# Patient Record
Sex: MALE | Race: WHITE | NOT HISPANIC OR LATINO | Employment: FULL TIME | URBAN - METROPOLITAN AREA
[De-identification: names, ages, dates, MRNs, and addresses within clinical notes are randomized per-mention and may not be internally consistent; named-entity substitution may affect disease eponyms.]

---

## 2017-01-30 ENCOUNTER — OFFICE VISIT (OUTPATIENT)
Dept: CARDIOLOGY | Facility: CLINIC | Age: 62
End: 2017-01-30

## 2017-01-30 VITALS
BODY MASS INDEX: 29.52 KG/M2 | HEART RATE: 83 BPM | WEIGHT: 206.2 LBS | SYSTOLIC BLOOD PRESSURE: 116 MMHG | HEIGHT: 70 IN | DIASTOLIC BLOOD PRESSURE: 86 MMHG

## 2017-01-30 DIAGNOSIS — I48.0 PAROXYSMAL ATRIAL FIBRILLATION (HCC): Primary | ICD-10-CM

## 2017-01-30 DIAGNOSIS — I48.3 TYPICAL ATRIAL FLUTTER (HCC): ICD-10-CM

## 2017-01-30 DIAGNOSIS — R07.89 OTHER CHEST PAIN: ICD-10-CM

## 2017-01-30 PROCEDURE — 99213 OFFICE O/P EST LOW 20 MIN: CPT | Performed by: INTERNAL MEDICINE

## 2017-01-30 NOTE — PROGRESS NOTES
Subjective:   Mike Knapp Jr.  1955  [phone]  [phone]    01/30/2017    [facility] CARDIOLOGY    Manuel Cooper MD  [address] [place] [address] [address] [place] [address]    REFERRING DOCTOR: Phoebe Renae      Patient ID: Mike Knapp Jr. is a 61 y.o. male.    Chief Complaint: Afib      IDENTIFICATION: The patient is a 61-year-old white male, [facility]  and resident of [place], [place], here for followup visit for his atrial flutter, status post ablation on 10/17/2014 and diagnosis of atrial fibrillation after that time s/p PVA in May 2016.      PROBLEM LIST:  1. Chest pain:  a. Symptoms of twinges of chest pain occurring at rest x3 months.   b. Exercise GXT, Dr. Welch: Exercise duration 4 minutes and 23 seconds with no evidence of inducible ischemia.   c. Echocardiogram, 05/27/2014, Dr. Welch, revealing normal left ventricular ejection fraction (60%) with normal valvular structures.   2. Atrial flutter/atrial fibrillation:  a. Documentation by EKG, 08/25/2014.  b. Patient is status post EP study and typical flutter ablation on 10/17/2014.   c. On 04/19/2014, patient noted palpitations at home and went to the emergency department. On telemetry monitor at the outside hospital, patient was noted to be in type 1 atrial fibrillation with rates of about 140 beats per minute. There was no atrial flutter noted on the telemetry strip or telemetry EKGs at this hospitalization.  d. Patient was started on sotalol 80 mg b.i.d. and continued on his Xarelto 20 mg once a day for this new onset detection of atrial fibrillation, status post atrial flutter ablation. CHADS score = 0 with questionable history of hypertension.   e. Patient is currently only on aspirin due to the CHADS score of 0 and per patient’s request even after explanation of his CHADS score of possibly 1 with questionable hypertension and recurrent episode of atrial fibrillation as  noted. The atrial fibrillation is very short-lived in nature.  f. Patient with continued episodes of short-lived atrial fibrillation even on the sotalol. Patient has stated that he has tried to taper off his sotalol, and whenever he is decreased to 40 mg of the sotalol, he states he has had recurrence of his atrial fibrillation with RVR.  g. Patient states that he thinks the sotalol has been causing him symptoms of feeling drained and also a heavy pressure throughout his body is noted.   h. Patient’s sotalol has been discontinued, and he is currently maintained on flecainide 50 mg b.i.d. and metoprolol 12.5 mg b.i.d. He is currently on aspirin, since he has a CHADS score of 0 with no long episodes of atrial fibrillation at the present time.   i. Patient maintained currently on flecainide 50 mg b.i.d. and aspirin with pill-in-the-pocket Xarelto. Patient has a CHADS-VASc score of 0. Patient states his last episodes of atrial fibrillation were in July and September, which lasted about 1 to 1-1/2 hours.   j. S/P PVA roof line on 5/24/2016. No recurrences of Afib. Off Flecainide now, only on metoprolol and NOAC. Could always feel the Afib.   k. Recurrent Afib on Zio patch 2% with PACs -- symptomatic  3. Thrombocytopenia:  a. Platelet count in October 2015 was 217 and most recent 92.  4. Hyperlipidemia.   5. Generalized anxiety disorder.   6. History of Clemons's esophagus/gastroesophageal reflux disease.   7. Osteoarthritis.   8. Recent colitis in 2014.  9. Left ear tinnitus secondary to a viral illness.   10. Benign lung cyst.   11. Surgical history:  a. Appendectomy.   b. Cholecystectomy.   c. Lasik surgery.   d. Knee surgery in 1960.   e. Partial colectomy.   f. Left inguinal hernia repair.     Allergies   Allergen Reactions   • Sulfa Antibiotics Rash       Current Outpatient Prescriptions:   •  cholecalciferol (VITAMIN D3) 1000 UNITS tablet, Take 1,000 Units by mouth Daily., Disp: , Rfl:   •  Cyanocobalamin (VITAMIN  "B 12 PO), Take 1 spray by mouth Daily., Disp: , Rfl:   •  ELIQUIS 5 MG tablet tablet, TAKE 1 TABLET TWO TIMES A DAY, Disp: 60 tablet, Rfl: 11  •  Flaxseed, Linseed, (FLAXSEED OIL PO), Take  by mouth Daily., Disp: , Rfl:   •  flecainide (TAMBOCOR) 50 MG tablet, Take 2 tablets by mouth 2 (Two) Times a Day., Disp: 120 tablet, Rfl: 11  •  lansoprazole (PREVACID) 30 MG capsule, Take 30 mg by mouth daily., Disp: , Rfl:   •  melatonin 1 MG tablet, Take 1.5 mg by mouth Every Night., Disp: , Rfl:   •  metoprolol tartrate (LOPRESSOR) 25 MG tablet, Take 0.5 mg by mouth 3 (Three) Times a Day., Disp: , Rfl:     History of Present Illness  61-year-old male with a history of atrial flutter in the past status post ablation with recurrent atrial fibrillation status post point vein ablation.  Patient is continuing to have recurrent episodes of atrial fibrillation as noted on monitor with 2% burden.  Continues to have symptoms of shortness of breath palpitations and some fatigue.  When he is in atrial fibrillation he does have rapid rates.  We'll continue to increase his flecainide is currently on 100 mg twice a day and continues to have these episodes.  He is now to the point that he states that he wants something else done.    No issues with chest pain, shortness of breath, fevers, chills, night sweats, PND, orthopnea or palpitations. No recent ER visits or hospital stays.      The following portions of the patient's history were reviewed and updated as appropriate: allergies, current medications, past family history, past medical history, past social history, past surgical history and problem list.    ROS   14 point ROS negative except as outlined in problem list, HPI and other parts of the note.    Procedures       Objective:       Vitals:    01/30/17 1516   BP: 116/86   BP Location: Left arm   Patient Position: Sitting   Pulse: 83   Weight: 206 lb 3.2 oz (93.5 kg)   Height: 70\" (177.8 cm)       GENERAL: Well-developed, " well-nourished patient in no acute distress.  HEENT: Normocephalic, atraumatic, PERRLA. Moist mucous membranes.  NECK: No JVD present at 30°. No carotid bruits auscultated.  LUNGS: Clear to auscultation.  CARDIOVASCULAR: Heart has a regular rate and rhythm. No murmurs, gallops or rubs noted.   ABDOMEN: Soft, nontender. Positive bowel sounds.  MUSCULOSKELETAL: No gross deformities. No clubbing, cyanosis, or lower extremity edema.  SKIN: Pink, warm  Neuro: Nonfocal exam. Gait intact  Ext: No edema or bruising    The patient's old records including ambulatory rhythm recordings (ECGs, Holter/event monitor) were reviewed and discussed.      Lab Review:   Results for orders placed or performed during the hospital encounter of 05/24/16   CBC (No diff)   Result Value Ref Range    WBC 4.95 3.50 - 10.80 K/mcL    RBC 4.82 4.20 - 5.76 M/mcL    Hemoglobin 15.8 13.1 - 17.5 g/dL    Hematocrit 44.5 38.9 - 50.9 %    MCV 92.3 80.0 - 99.0 fL    MCH 32.8 (H) 27.0 - 31.0 pg    MCHC 35.5 32.0 - 36.0 g/dL    RDW-CV 13.0 11.3 - 14.5 %    Platelets 180 150 - 450 K/mcL   Magnesium   Result Value Ref Range    Magnesium 2.0 1.3 - 2.7 mg/dL   Basic metabolic panel   Result Value Ref Range    Glucose 90 70 - 100 mg/dL    BUN 14 9 - 23 mg/dL    Creatinine 0.9 0.6 - 1.3 mg/dL    Sodium 141 132 - 146 mmol/L    Potassium 4.9 3.5 - 5.5 mmol/L    Chloride 107 99 - 109 mmol/L    CO2 27 20 - 31 mmol/L    Calcium 9.0 8.7 - 10.4 mg/dL    Est GFR by Clearance 86 ml/min/1.732    Anion Gap 7 3 - 11 mmol/L   TSH   Result Value Ref Range    TSH 2.272 0.350 - 5.350 UIU/mL   Hemoglobin A1c   Result Value Ref Range    Hemoglobin A1C 4.6 4.00 - 6.00 %    Mean Bld Glu Estim. 78 mg/dL   POCT activated clotting time   Result Value Ref Range    Activated Clotting Time 380 (H) 74 - 125 Seconds   POCT activated clotting time   Result Value Ref Range    Activated Clotting Time 356 (H) 74 - 125 Seconds   POCT activated clotting time   Result Value Ref Range    Activated  Clotting Time 349 (H) 74 - 125 Seconds   POCT activated clotting time   Result Value Ref Range    Activated Clotting Time 398 (H) 74 - 125 Seconds   POCT activated clotting time   Result Value Ref Range    Activated Clotting Time 251 (H) 74 - 125 Seconds   POCT activated clotting time   Result Value Ref Range    Activated Clotting Time 134 (H) 74 - 125 Seconds   POCT activated clotting time   Result Value Ref Range    Activated Clotting Time 288 (H) 74 - 125 Seconds           Diagnosis:   1. Paroxysmal atrial fibrillation  2. Typical atrial flutter  3. Other chest pain      Assessment & Plan:   1. Paroxysmal Atrial Fibrillation s/p PVA / Roof Line due to failed AADs and medications in may 2016 and atrial flutter s/p typical flutter ablation in 2014. His antiarrhythmic medication was stopped initially however restarted due to recurrent tachycardia palpitations.  He's currently on flecainide 100 mg twice a day and a most recent 2 week monitor his had 2% symptomatic atrial fibrillation rapid ventricular rates.  He continues to have these symptomatic episodes despite medical therapy.  We will stop the patient's flecainide on Wednesday and start him on dofetilide on Tuesday.  We'll call his insurance to see the cause.  If he was to have recurrent episodes of atrial fibrillation at that time then consideration for redo ablation.      2. GERD -- On PPI         CC: Manuel Lynch DO  01/30/17  4:01 PM      EMR Dragon/Transcription disclaimer:  Much of this encounter note is an electronic transcription/translation of spoken language to printed text. Electronic translation of spoken language may permit erroneous, or at times, nonsensical words or phrases to be inadvertently transcribed. Although I have reviewed the note for such errors, some may still exist.

## 2017-02-02 ENCOUNTER — PREP FOR SURGERY (OUTPATIENT)
Dept: CARDIOLOGY | Facility: CLINIC | Age: 62
End: 2017-02-02

## 2017-02-06 ENCOUNTER — APPOINTMENT (OUTPATIENT)
Dept: PREADMISSION TESTING | Facility: HOSPITAL | Age: 62
End: 2017-02-06

## 2017-02-06 LAB
ANION GAP SERPL CALCULATED.3IONS-SCNC: -2 MMOL/L (ref 3–11)
APTT PPP: 29 SECONDS (ref 24–31)
BUN BLD-MCNC: 13 MG/DL (ref 9–23)
BUN/CREAT SERPL: 14.4 (ref 7–25)
CA-I SERPL ISE-MCNC: 1.24 MMOL/L (ref 1.12–1.32)
CALCIUM SPEC-SCNC: 9.3 MG/DL (ref 8.7–10.4)
CHLORIDE SERPL-SCNC: 107 MMOL/L (ref 99–109)
CO2 SERPL-SCNC: 36 MMOL/L (ref 20–31)
CREAT BLD-MCNC: 0.9 MG/DL (ref 0.6–1.3)
GFR SERPL CREATININE-BSD FRML MDRD: 85 ML/MIN/1.73
GLUCOSE BLD-MCNC: 87 MG/DL (ref 70–100)
INR PPP: 0.97
MAGNESIUM SERPL-MCNC: 2.1 MG/DL (ref 1.3–2.7)
POTASSIUM BLD-SCNC: 4.7 MMOL/L (ref 3.5–5.5)
PROTHROMBIN TIME: 10.6 SECONDS (ref 9.6–11.5)
SODIUM BLD-SCNC: 141 MMOL/L (ref 132–146)

## 2017-02-06 PROCEDURE — 93010 ELECTROCARDIOGRAM REPORT: CPT | Performed by: INTERNAL MEDICINE

## 2017-02-07 ENCOUNTER — HOSPITAL ENCOUNTER (INPATIENT)
Facility: HOSPITAL | Age: 62
LOS: 2 days | Discharge: HOME | End: 2017-02-09
Attending: INTERNAL MEDICINE | Admitting: INTERNAL MEDICINE

## 2017-02-07 PROCEDURE — 93005 ELECTROCARDIOGRAM TRACING: CPT | Performed by: INTERNAL MEDICINE

## 2017-02-07 PROCEDURE — 99222 1ST HOSP IP/OBS MODERATE 55: CPT | Performed by: INTERNAL MEDICINE

## 2017-02-07 PROCEDURE — 93010 ELECTROCARDIOGRAM REPORT: CPT | Performed by: INTERNAL MEDICINE

## 2017-02-07 RX ORDER — MELATONIN
1000 DAILY
Status: DISCONTINUED | OUTPATIENT
Start: 2017-02-07 | End: 2017-02-09 | Stop reason: HOSPADM

## 2017-02-07 RX ORDER — POTASSIUM CHLORIDE 750 MG/1
40 CAPSULE, EXTENDED RELEASE ORAL AS NEEDED
Status: DISCONTINUED | OUTPATIENT
Start: 2017-02-07 | End: 2017-02-09 | Stop reason: HOSPADM

## 2017-02-07 RX ORDER — POTASSIUM CHLORIDE 1.5 G/1.77G
40 POWDER, FOR SOLUTION ORAL AS NEEDED
Status: DISCONTINUED | OUTPATIENT
Start: 2017-02-07 | End: 2017-02-09 | Stop reason: HOSPADM

## 2017-02-07 RX ORDER — UBIDECARENONE 75 MG
100 CAPSULE ORAL DAILY
Status: DISCONTINUED | OUTPATIENT
Start: 2017-02-07 | End: 2017-02-09 | Stop reason: HOSPADM

## 2017-02-07 RX ORDER — DOFETILIDE 0.5 MG/1
500 CAPSULE ORAL EVERY 12 HOURS SCHEDULED
Status: DISCONTINUED | OUTPATIENT
Start: 2017-02-07 | End: 2017-02-08

## 2017-02-07 RX ORDER — DOFETILIDE 0.5 MG/1
500 CAPSULE ORAL ONCE
Status: COMPLETED | OUTPATIENT
Start: 2017-02-07 | End: 2017-02-07

## 2017-02-07 RX ORDER — PANTOPRAZOLE SODIUM 40 MG/1
40 TABLET, DELAYED RELEASE ORAL
Status: DISCONTINUED | OUTPATIENT
Start: 2017-02-07 | End: 2017-02-09 | Stop reason: HOSPADM

## 2017-02-07 RX ADMIN — METOPROLOL TARTRATE 12.5 MG: 25 TABLET ORAL at 20:16

## 2017-02-07 RX ADMIN — METOPROLOL TARTRATE 12.5 MG: 25 TABLET ORAL at 11:48

## 2017-02-07 RX ADMIN — DOFETILIDE 500 MCG: 0.5 CAPSULE ORAL at 22:00

## 2017-02-07 RX ADMIN — DOFETILIDE 500 MCG: 0.5 CAPSULE ORAL at 11:48

## 2017-02-07 RX ADMIN — APIXABAN 5 MG: 5 TABLET, FILM COATED ORAL at 18:37

## 2017-02-07 NOTE — H&P
Electrophysiology History & Physical    Mike Knapp Jr.  2508/[birth year]  [id number]  1955    DATE OF ADMISSION: 2/7/2017    Manuel Cooper MD  Primary Cardiologist: Adithya Lynch      Chief complaint Afib   a.     History of Present Illness   61-year-old male with a history of atrial flutter in the past status post ablation with recurrent atrial fibrillation status post pulm vein ablation. Patient is continuing to have recurrent episodes of atrial fibrillation as noted on monitor with 2% burden. Continues to have symptoms of shortness of breath palpitations and some fatigue and recurrent episodes of Afib. When he is in atrial fibrillation he does have rapid rates. We had increased his flecainide is currently on 100 mg twice a day and continues to have these episodes. He was to the point that he states that he wants something else done and had his Flec stopped for > 5 days and now here for Tikosyn loading.     In SR and QTc looks ok.      Past Medical History   Diagnosis Date   • AF (atrial fibrillation)    • Atrial flutter 8/25/2016     atrial fibrillation: Documentation by EKG, 08/25/2014. Patient is status post EP study and typical flutter ablation on 10/17/2014.   On 04/19/2014, patient noted palpitations at home and went to the emergency department.  On telemetry monitor at the outside hospital, patient was noted to be in type 1 atrial fibrillation with rates of about 140 beats per minute.  There was no atrial flutter noted o   • Barretts esophagus    • Chest pain    • Colitis 2014   • Colitis 2014   • CLAUDIA (generalized anxiety disorder)    • GERD (gastroesophageal reflux disease)      Clemons's esophagus H/O   • H/O atrial flutter    • Hyperlipidemia    • Lung cyst      benign   • Lung cyst      Benign    • Osteoarthritis    • Osteoarthritis    • PONV (postoperative nausea and vomiting)    • Thrombocytopenia    • Tinnitus      Left ear Secondary to a viral illness    • Tinnitus of left ear      secondary  to viral illness         Past Surgical History   Procedure Laterality Date   • Appendectomy     • Cholecystectomy     • Lasik     • Knee surgery  1960   • Colectomy partial / total     • Inguinal hernia repair Left    • Lasik     • Cardiac ablation     • Hernia repair         Prescriptions Prior to Admission   Medication Sig Dispense Refill Last Dose   • cholecalciferol (VITAMIN D3) 1000 UNITS tablet Take 1,000 Units by mouth Daily.   2/7/2017 at 0700   • Cyanocobalamin (VITAMIN B 12 PO) Take 1 spray by mouth Daily.   Past Week at Unknown time   • ELIQUIS 5 MG tablet tablet TAKE 1 TABLET TWO TIMES A DAY 60 tablet 11 2/7/2017 at 0700   • Flaxseed, Linseed, (FLAXSEED OIL PO) Take 1 tablet by mouth Daily.   2/7/2017 at 0700   • lansoprazole (PREVACID) 30 MG capsule Take 30 mg by mouth daily.   2/7/2017 at 0700   • melatonin 1 MG tablet Take 1.5 mg by mouth Every Night.   2/6/2017 at 2100   • metoprolol tartrate (LOPRESSOR) 25 MG tablet Take 12.5 mg by mouth 3 (Three) Times a Day.   2/7/2017 at 0400       Current Meds  No current facility-administered medications on file prior to encounter.      Current Outpatient Prescriptions on File Prior to Encounter   Medication Sig Dispense Refill   • cholecalciferol (VITAMIN D3) 1000 UNITS tablet Take 1,000 Units by mouth Daily.     • Cyanocobalamin (VITAMIN B 12 PO) Take 1 spray by mouth Daily.     • ELIQUIS 5 MG tablet tablet TAKE 1 TABLET TWO TIMES A DAY 60 tablet 11   • Flaxseed, Linseed, (FLAXSEED OIL PO) Take 1 tablet by mouth Daily.     • lansoprazole (PREVACID) 30 MG capsule Take 30 mg by mouth daily.     • melatonin 1 MG tablet Take 1.5 mg by mouth Every Night.     • metoprolol tartrate (LOPRESSOR) 25 MG tablet Take 12.5 mg by mouth 3 (Three) Times a Day.     • [DISCONTINUED] flecainide (TAMBOCOR) 50 MG tablet Take 2 tablets by mouth 2 (Two) Times a Day. (Patient taking differently: Take 100 mg by mouth 2 (Two) Times a Day. Stopped per Dr. Johns orders on 1-31-17) 120  "tablet 11         Social History     Social History   • Marital status:      Spouse name: N/A   • Number of children: N/A   • Years of education: N/A     Occupational History   • Not on file.     Social History Main Topics   • Smoking status: Never Smoker   • Smokeless tobacco: Never Used   • Alcohol use No   • Drug use: No   • Sexual activity: Defer     Other Topics Concern   • Not on file     Social History Narrative         REVIEW OF SYSTEMS:   12 point ROS was performed and is negative except as outlined in HPI           Objective:     Vitals:    02/07/17 0945 02/07/17 0947 02/07/17 1148 02/07/17 1330   BP: 127/78 120/78 124/80 115/75   BP Location: Right arm Left arm     Patient Position: Lying Lying     Pulse: 89  76 62   Resp: 18   16   Temp: 98.8 °F (37.1 °C)      TempSrc: Tympanic      SpO2: 95%   97%   Weight: 210 lb 12.2 oz (95.6 kg)      Height: 69\" (175.3 cm)        Body mass index is 31.12 kg/(m^2).  Flowsheet Rows         First Filed Value    Admission Height  69\" (175.3 cm) Documented at 02/07/2017 0945    Admission Weight  210 lb 12.2 oz (95.6 kg) Documented at 02/07/2017 0945          General Appearance:    Alert, cooperative, in no acute distress   Head:    Normocephalic, without obvious abnormality, atraumatic   Eyes:            Lids and lashes normal, conjunctivae and sclerae normal, no   icterus, no pallor, corneas clear, PERRLA   Ears:    Ears appear intact with no abnormalities noted   Throat:   No oral lesions, no thrush, oral mucosa moist   Neck:   No adenopathy, supple, trachea midline, no thyromegaly, no   carotid bruit, no JVD   Back:     No kyphosis present, no scoliosis present, no skin lesions, erythema or scars, no tenderness to percussion or palpation, range of motion normal   Lungs:     Clear to auscultation,respirations regular, even and unlabored    Heart:    Regular rhythm and normal rate, normal S1 and S2, no murmur, no gallop, no rub, no click   Chest Wall:    No " abnormalities observed   Abdomen:     Normal bowel sounds, no masses, no organomegaly, soft        non-tender, non-distended, no guarding, no rebound  tenderness   Extremities:   Moves all extremities well, no edema, no cyanosis, no redness   Pulses:   Pulses palpable and equal bilaterally. Normal radial, carotid, femoral, dorsalis pedis and posterior tibial pulses bilaterally. Normal abdominal aorta   Skin:   No bleeding, bruising or rash   Lymph nodes:   No palpable adenopathy   Psychiatric:   Alert and oented x 3, normal mood and affect.   Lab Review:      Results Review:     I reviewed the patient's new clinical results.          Results from last 7 days  Lab Units 02/06/17  1158   SODIUM mmol/L 141   POTASSIUM mmol/L 4.7   CHLORIDE mmol/L 107   TOTAL CO2 mmol/L 36.0*   BUN mg/dL 13   CREATININE mg/dL 0.90   CALCIUM mg/dL 9.3   GLUCOSE mg/dL 87       Results from last 7 days  Lab Units 02/06/17  1158   SODIUM mmol/L 141   POTASSIUM mmol/L 4.7   CHLORIDE mmol/L 107   TOTAL CO2 mmol/L 36.0*   BUN mg/dL 13   CREATININE mg/dL 0.90   GLUCOSE mg/dL 87   CALCIUM mg/dL 9.3       Results from last 7 days  Lab Units 02/06/17  1120   INR  0.97     No results found for: TROPONINT                  Imaging:     EKG: NSR with rate 68 bpm. QTc ok    I personally viewed and interpreted the patient's EKG/Telemetry data    Assessment:    Active Problems:    Paroxysmal atrial fibrillation    Typical Atrial Flutter       Plan:   1. Paroxysmal Atrial Fibrillation s/p PVA / Roof Line due to failed AADs and medications in may 2016 and atrial flutter s/p typical flutter ablation in 2014. His antiarrhythmic medication was stopped initially however restarted due to recurrent tachycardia palpitations. He was on flecainide 100 mg twice a day and a most recent 2 week monitor his had 2% symptomatic atrial fibrillation rapid ventricular rates. He continues to have these symptomatic episodes despite medical therapy. Here today for Tikosyn  loading. QTc ok. Has bene off Flec for > 5 days    --> Start on Tikosyn 500 mcg BID and continue metoprolol and eliquis  --> DC on Thurs if no issues. In SR today      CC: Manuel Lynch DO  [facility]  02/07/17  1:50 PM    EMR Dragon/Transcription disclaimer:  Much of this encounter note is an electronic transcription/translation of spoken language to printed text. Electronic translation of spoken language may permit erroneous, or at times, nonsensical words or phrases to be inadvertently transcribed. Although I have reviewed the note for such errors, some may still exist.

## 2017-02-07 NOTE — PROGRESS NOTES
"Tikosyn Initiation - Pharmacy Evaluation    Name- Mike Knapp Jr.  Age- 61 y.o.  Sex- male  HT - 69\" (175.3 cm)  Wt - 210 lb 12.2 oz (95.6 kg)      Evaluation of Drug-Drug Interactions     Previous antiarrythmic medications D/C 'ed prior to admission     Amiodarone D/C 'ed >3 weeks  Sotalol D/C 'ed > 48hr  Class I antiarrythmic's (Disopyramide,Flecainide, Mexiletine, Propafenone) D/C 'ed >48hr     Proceed -Yes; Flecainide was stopped on last Wednesday      Drug-Drug Interactions with admission regimen    The Following medications are contraindicated with Dofetilide and will be discontinued:  Cimetidine, Ciprofloxacin, Dolutegravir, Fingolimod, Hydrochlorothiazide and combination products containing Hydrochlorothiazide, Itraconazole, Ketoconazole, Levofloxacin, Megestrol, Moxifloxacin, Norfloxacin, Pimozide, Posconazole, Prochloperazine, Saquinavir, Thioridazine, Trimethoprim, Trimethoprim-Sulfamethoxazole, Verapamil, Ziprasidone    The following medications are recognized to prolong QT interval, however the medication continue during initial Dofetilide dosing:  -Asenapine, Diltiazem, EriBULin, Haloperidol, Ivabradine, Procainamide, Quetiapine  -Phenothiazines (chlorpromazine, fluphenazine, mesoridazine, perphenazine, promazine, trifluoperazine)  -Ondansetron, Paliperidone  -Loop Diuretics (bumetanide, furosemide, torsemide)  -Antidepressants (Amitriptyline, Amoxapine, Clomipramine, Desipramine, Doxepin, Imipramine, Maprotiline, Mirtazapine, Nortriptyline, Protriptyline, Triipramine)    The following medications may increase Dofetilide serum concentrations and can be co-administered:  -Antifungals (Fluconazole, Voriconazole)  -Macrolides Antibiotics (Erythromycin, Clarithromycin)  -Metformin, Glyburide  -SSRI's (Citalopram, Escitalopram, Fluvoxamine, Fluoxetine, Paroxetine, Sertraline)  -Protease Inhibitors (Amprenavir, Indinavir, Nelfinavir, Ritonavir)  -Amiloride, Cannabinoids, Nefazodone, Triamterene, " Quinine, Lamotrigine)  -Ibutilide, Disopyramide, Diltiazem    Laboratory      Results from last 7 days     Lab Units 02/06/17  1158   SODIUM mmol/L 141   POTASSIUM mmol/L 4.7   CHLORIDE mmol/L 107   TOTAL CO2 mmol/L 36.0*   BUN mg/dL 13   CREATININE mg/dL 0.90   GLUCOSE mg/dL 87   CALCIUM mg/dL 9.3       Results from last 7 days     Lab Units 02/06/17  1158   MAGNESIUM mg/dL 2.1     Electrolyte replacement ordered:   Mg <2.0 - Mg currently 2.1      K <4.0  - K currently 4.7    Est CrCl =  113.6 ml/min  (calculated with Cockroft-Gault equation using actual body weight and serum creatinine for calculation)  (laboratory values from previous 24 hours can be used)    Initial Dose    Patient has functioning atrial pacemaker -  No   Proceed - yes    QTc = Report 393msec, calculated 341 msec    QTc </= 440 msec -  Yes   Proceed - Yes    Initial Dose:   CrCl >60      Dofetilide 500mcg po q12h  (dosed at 10 hours intervals until administration times between 7-9)      José Antonio Gresham RPH  2/7/2017  10:28 AM

## 2017-02-07 NOTE — H&P (VIEW-ONLY)
Subjective:   Mike Knapp Jr.  1955  [phone]  [phone]    01/30/2017    [facility] CARDIOLOGY    Manuel oCoper MD  [address] [place] [address] [address] [place] [address]    REFERRING DOCTOR: Phoebe Renae      Patient ID: Mike Knapp Jr. is a 61 y.o. male.    Chief Complaint: Afib      IDENTIFICATION: The patient is a 61-year-old white male, [facility]  and resident of [place], [place], here for followup visit for his atrial flutter, status post ablation on 10/17/2014 and diagnosis of atrial fibrillation after that time s/p PVA in May 2016.      PROBLEM LIST:  1. Chest pain:  a. Symptoms of twinges of chest pain occurring at rest x3 months.   b. Exercise GXT, Dr. Welch: Exercise duration 4 minutes and 23 seconds with no evidence of inducible ischemia.   c. Echocardiogram, 05/27/2014, Dr. Welch, revealing normal left ventricular ejection fraction (60%) with normal valvular structures.   2. Atrial flutter/atrial fibrillation:  a. Documentation by EKG, 08/25/2014.  b. Patient is status post EP study and typical flutter ablation on 10/17/2014.   c. On 04/19/2014, patient noted palpitations at home and went to the emergency department. On telemetry monitor at the outside hospital, patient was noted to be in type 1 atrial fibrillation with rates of about 140 beats per minute. There was no atrial flutter noted on the telemetry strip or telemetry EKGs at this hospitalization.  d. Patient was started on sotalol 80 mg b.i.d. and continued on his Xarelto 20 mg once a day for this new onset detection of atrial fibrillation, status post atrial flutter ablation. CHADS score = 0 with questionable history of hypertension.   e. Patient is currently only on aspirin due to the CHADS score of 0 and per patient’s request even after explanation of his CHADS score of possibly 1 with questionable hypertension and recurrent episode of atrial fibrillation as  noted. The atrial fibrillation is very short-lived in nature.  f. Patient with continued episodes of short-lived atrial fibrillation even on the sotalol. Patient has stated that he has tried to taper off his sotalol, and whenever he is decreased to 40 mg of the sotalol, he states he has had recurrence of his atrial fibrillation with RVR.  g. Patient states that he thinks the sotalol has been causing him symptoms of feeling drained and also a heavy pressure throughout his body is noted.   h. Patient’s sotalol has been discontinued, and he is currently maintained on flecainide 50 mg b.i.d. and metoprolol 12.5 mg b.i.d. He is currently on aspirin, since he has a CHADS score of 0 with no long episodes of atrial fibrillation at the present time.   i. Patient maintained currently on flecainide 50 mg b.i.d. and aspirin with pill-in-the-pocket Xarelto. Patient has a CHADS-VASc score of 0. Patient states his last episodes of atrial fibrillation were in July and September, which lasted about 1 to 1-1/2 hours.   j. S/P PVA roof line on 5/24/2016. No recurrences of Afib. Off Flecainide now, only on metoprolol and NOAC. Could always feel the Afib.   k. Recurrent Afib on Zio patch 2% with PACs -- symptomatic  3. Thrombocytopenia:  a. Platelet count in October 2015 was 217 and most recent 92.  4. Hyperlipidemia.   5. Generalized anxiety disorder.   6. History of Clemons's esophagus/gastroesophageal reflux disease.   7. Osteoarthritis.   8. Recent colitis in 2014.  9. Left ear tinnitus secondary to a viral illness.   10. Benign lung cyst.   11. Surgical history:  a. Appendectomy.   b. Cholecystectomy.   c. Lasik surgery.   d. Knee surgery in 1960.   e. Partial colectomy.   f. Left inguinal hernia repair.     Allergies   Allergen Reactions   • Sulfa Antibiotics Rash       Current Outpatient Prescriptions:   •  cholecalciferol (VITAMIN D3) 1000 UNITS tablet, Take 1,000 Units by mouth Daily., Disp: , Rfl:   •  Cyanocobalamin (VITAMIN  "B 12 PO), Take 1 spray by mouth Daily., Disp: , Rfl:   •  ELIQUIS 5 MG tablet tablet, TAKE 1 TABLET TWO TIMES A DAY, Disp: 60 tablet, Rfl: 11  •  Flaxseed, Linseed, (FLAXSEED OIL PO), Take  by mouth Daily., Disp: , Rfl:   •  flecainide (TAMBOCOR) 50 MG tablet, Take 2 tablets by mouth 2 (Two) Times a Day., Disp: 120 tablet, Rfl: 11  •  lansoprazole (PREVACID) 30 MG capsule, Take 30 mg by mouth daily., Disp: , Rfl:   •  melatonin 1 MG tablet, Take 1.5 mg by mouth Every Night., Disp: , Rfl:   •  metoprolol tartrate (LOPRESSOR) 25 MG tablet, Take 0.5 mg by mouth 3 (Three) Times a Day., Disp: , Rfl:     History of Present Illness  61-year-old male with a history of atrial flutter in the past status post ablation with recurrent atrial fibrillation status post point vein ablation.  Patient is continuing to have recurrent episodes of atrial fibrillation as noted on monitor with 2% burden.  Continues to have symptoms of shortness of breath palpitations and some fatigue.  When he is in atrial fibrillation he does have rapid rates.  We'll continue to increase his flecainide is currently on 100 mg twice a day and continues to have these episodes.  He is now to the point that he states that he wants something else done.    No issues with chest pain, shortness of breath, fevers, chills, night sweats, PND, orthopnea or palpitations. No recent ER visits or hospital stays.      The following portions of the patient's history were reviewed and updated as appropriate: allergies, current medications, past family history, past medical history, past social history, past surgical history and problem list.    ROS   14 point ROS negative except as outlined in problem list, HPI and other parts of the note.    Procedures       Objective:       Vitals:    01/30/17 1516   BP: 116/86   BP Location: Left arm   Patient Position: Sitting   Pulse: 83   Weight: 206 lb 3.2 oz (93.5 kg)   Height: 70\" (177.8 cm)       GENERAL: Well-developed, " well-nourished patient in no acute distress.  HEENT: Normocephalic, atraumatic, PERRLA. Moist mucous membranes.  NECK: No JVD present at 30°. No carotid bruits auscultated.  LUNGS: Clear to auscultation.  CARDIOVASCULAR: Heart has a regular rate and rhythm. No murmurs, gallops or rubs noted.   ABDOMEN: Soft, nontender. Positive bowel sounds.  MUSCULOSKELETAL: No gross deformities. No clubbing, cyanosis, or lower extremity edema.  SKIN: Pink, warm  Neuro: Nonfocal exam. Gait intact  Ext: No edema or bruising    The patient's old records including ambulatory rhythm recordings (ECGs, Holter/event monitor) were reviewed and discussed.      Lab Review:   Results for orders placed or performed during the hospital encounter of 05/24/16   CBC (No diff)   Result Value Ref Range    WBC 4.95 3.50 - 10.80 K/mcL    RBC 4.82 4.20 - 5.76 M/mcL    Hemoglobin 15.8 13.1 - 17.5 g/dL    Hematocrit 44.5 38.9 - 50.9 %    MCV 92.3 80.0 - 99.0 fL    MCH 32.8 (H) 27.0 - 31.0 pg    MCHC 35.5 32.0 - 36.0 g/dL    RDW-CV 13.0 11.3 - 14.5 %    Platelets 180 150 - 450 K/mcL   Magnesium   Result Value Ref Range    Magnesium 2.0 1.3 - 2.7 mg/dL   Basic metabolic panel   Result Value Ref Range    Glucose 90 70 - 100 mg/dL    BUN 14 9 - 23 mg/dL    Creatinine 0.9 0.6 - 1.3 mg/dL    Sodium 141 132 - 146 mmol/L    Potassium 4.9 3.5 - 5.5 mmol/L    Chloride 107 99 - 109 mmol/L    CO2 27 20 - 31 mmol/L    Calcium 9.0 8.7 - 10.4 mg/dL    Est GFR by Clearance 86 ml/min/1.732    Anion Gap 7 3 - 11 mmol/L   TSH   Result Value Ref Range    TSH 2.272 0.350 - 5.350 UIU/mL   Hemoglobin A1c   Result Value Ref Range    Hemoglobin A1C 4.6 4.00 - 6.00 %    Mean Bld Glu Estim. 78 mg/dL   POCT activated clotting time   Result Value Ref Range    Activated Clotting Time 380 (H) 74 - 125 Seconds   POCT activated clotting time   Result Value Ref Range    Activated Clotting Time 356 (H) 74 - 125 Seconds   POCT activated clotting time   Result Value Ref Range    Activated  Clotting Time 349 (H) 74 - 125 Seconds   POCT activated clotting time   Result Value Ref Range    Activated Clotting Time 398 (H) 74 - 125 Seconds   POCT activated clotting time   Result Value Ref Range    Activated Clotting Time 251 (H) 74 - 125 Seconds   POCT activated clotting time   Result Value Ref Range    Activated Clotting Time 134 (H) 74 - 125 Seconds   POCT activated clotting time   Result Value Ref Range    Activated Clotting Time 288 (H) 74 - 125 Seconds           Diagnosis:   1. Paroxysmal atrial fibrillation  2. Typical atrial flutter  3. Other chest pain      Assessment & Plan:   1. Paroxysmal Atrial Fibrillation s/p PVA / Roof Line due to failed AADs and medications in may 2016 and atrial flutter s/p typical flutter ablation in 2014. His antiarrhythmic medication was stopped initially however restarted due to recurrent tachycardia palpitations.  He's currently on flecainide 100 mg twice a day and a most recent 2 week monitor his had 2% symptomatic atrial fibrillation rapid ventricular rates.  He continues to have these symptomatic episodes despite medical therapy.  We will stop the patient's flecainide on Wednesday and start him on dofetilide on Tuesday.  We'll call his insurance to see the cause.  If he was to have recurrent episodes of atrial fibrillation at that time then consideration for redo ablation.      2. GERD -- On PPI         CC: Manuel Lynch DO  01/30/17  4:01 PM      EMR Dragon/Transcription disclaimer:  Much of this encounter note is an electronic transcription/translation of spoken language to printed text. Electronic translation of spoken language may permit erroneous, or at times, nonsensical words or phrases to be inadvertently transcribed. Although I have reviewed the note for such errors, some may still exist.

## 2017-02-07 NOTE — INTERVAL H&P NOTE
H&P reviewed. The patient was examined and there are no changes to the H&P.  Vitals reviewed, labs reviewed, PE performed, EKG reviewed.  Will proceed with Tikosyn initiation.  Pharmacy to be consulted.   Marybeth Sanchez PA-C

## 2017-02-07 NOTE — PLAN OF CARE
Problem: Patient Care Overview (Adult)  Goal: Plan of Care Review  Outcome: Ongoing (interventions implemented as appropriate)    02/07/17 1514   Outcome Evaluation   Outcome Summary/Follow up Plan Pt arrived to floor around 1350. NSR. First dose of Tikosyn given today. No c/o pain, no s/s of distress. VSS.        Goal: Adult Individualization and Mutuality  Outcome: Ongoing (interventions implemented as appropriate)  Goal: Discharge Needs Assessment  Outcome: Ongoing (interventions implemented as appropriate)    Problem: Arrhythmia/Dysrhythmia (Symptomatic) (Adult)  Goal: Signs and Symptoms of Listed Potential Problems Will be Absent or Manageable (Arrhythmia/Dysrhythmia)  Outcome: Ongoing (interventions implemented as appropriate)

## 2017-02-07 NOTE — PROGRESS NOTES
Discharge Planning Assessment  [facility]     Patient Name: Mike Knapp Jr.  MRN: [id number]  Today's Date: 2/7/2017    Admit Date: 2/7/2017          Discharge Needs Assessment       02/07/17 1542    Living Environment    Lives With spouse   pt resides in [place] with his wife Tamar     Living Arrangements house    Provides Primary Care For no one    Quality Of Family Relationships supportive;helpful;involved    Able to Return to Prior Living Arrangements yes    Discharge Needs Assessment    Concerns To Be Addressed no discharge needs identified;denies needs/concerns at this time    Readmission Within The Last 30 Days no previous admission in last 30 days    Anticipated Changes Related to Illness none    Equipment Currently Used at Home none    Equipment Needed After Discharge none    Transportation Available car;family or friend will provide    Discharge Contact Information if Applicable [phone]            Discharge Plan       02/07/17 1544    Case Management/Social Work Plan    Plan home    Patient/Family In Agreement With Plan yes    Additional Comments Spoke with pt at bedside, pt plans to return home at time of discharge. Pt has been started on Tikosyn 500mcg BID, called pt's preferred pharmacy [facility] in [place] ([phone]) and pt's copayment with insurance will be $399.18. Pt reports he spoke with [facility] himself and they reported a cost of  $190.00. CM confirmed with [facility] store that RX was run for correct dosage, generic brand for a qty of 60/ 30 day supply and they confirmed that is what they checked price for.  Pt does report he has a $3,000 deductible to meet however does not think this applies to the cost of this medication. Tried to explain to pt the cost [facility] gave him at $190 may have been the cost after he meets his deductible and pt does not believe this is true. Pt does have a $10 copayment card with him, CM will wait to see if pt remains on 500mcg dose and have  pharmacy run coupon card to see if it will assist with pt's cost. [facility] does not have medication in stock and will need to 24 hours to get med in stock and will need to know if pt will need Brand or Generic dependent upon if  coupon card works. CM will follow up with pt and assist with discharge needs.         Discharge Placement     No information found                Demographic Summary       02/07/17 3775    Referral Information    Referral Source admission list    Contact Information    Permission Granted to Share Information With     Primary Care Physician Information    Name Manuel Cooper            Functional Status       02/07/17 9647    Functional Status Current    Current Functional Level Comment see previous charting    Functional Status Prior    Ambulation 0-->independent    Transferring 0-->independent    Toileting 0-->independent    Bathing 0-->independent    Dressing 0-->independent    Eating 0-->independent    Communication 0-->understands/communicates without difficulty    Swallowing 0-->swallows foods/liquids without difficulty    IADL    Medications independent   pt confirms he has prescription drug coverage denies issues obtaining or affording currently prescribed  medications    Meal Preparation independent    Housekeeping independent    Laundry independent    Shopping independent    Oral Care independent    Activity Tolerance    Current Activity Limitations none    Usual Activity Tolerance good    Current Activity Tolerance good    Employment/Financial    Financial Concerns none   pt confirms he has insurance provided by [employer], denies concerns or disruption in coverage            Psychosocial     None            Abuse/Neglect     None            Legal     None            Substance Abuse     None            Patient Forms     None          Evon Thurman

## 2017-02-08 PROCEDURE — 93005 ELECTROCARDIOGRAM TRACING: CPT | Performed by: INTERNAL MEDICINE

## 2017-02-08 PROCEDURE — 93010 ELECTROCARDIOGRAM REPORT: CPT | Performed by: INTERNAL MEDICINE

## 2017-02-08 RX ORDER — DOFETILIDE 0.5 MG/1
500 CAPSULE ORAL EVERY 12 HOURS SCHEDULED
Status: DISCONTINUED | OUTPATIENT
Start: 2017-02-09 | End: 2017-02-09 | Stop reason: HOSPADM

## 2017-02-08 RX ADMIN — METOPROLOL TARTRATE 12.5 MG: 25 TABLET ORAL at 11:35

## 2017-02-08 RX ADMIN — APIXABAN 5 MG: 5 TABLET, FILM COATED ORAL at 17:56

## 2017-02-08 RX ADMIN — DOFETILIDE 500 MCG: 0.5 CAPSULE ORAL at 08:06

## 2017-02-08 RX ADMIN — METOPROLOL TARTRATE 12.5 MG: 25 TABLET ORAL at 05:05

## 2017-02-08 RX ADMIN — METOPROLOL TARTRATE 12.5 MG: 25 TABLET ORAL at 20:00

## 2017-02-08 RX ADMIN — APIXABAN 5 MG: 5 TABLET, FILM COATED ORAL at 05:05

## 2017-02-08 RX ADMIN — DOFETILIDE 500 MCG: 0.5 CAPSULE ORAL at 17:56

## 2017-02-08 NOTE — PROGRESS NOTES
[facility]  Cardiovascular Progress Note  Mike Knapp Jr.  [id number]  [id number]  1955    DATE OF ADMISSION: 2/7/2017  DATE OF FOLLOW UP:  2/8/17    Manuel Cooper MD    Subjective:     Patient ID: Mike Knapp Jr. is a 61 y.o. male.    Chief Complaint: afib f/u     Allergies   Allergen Reactions   • Sulfa Antibiotics Rash       Current Facility-Administered Medications:   •  apixaban (ELIQUIS) tablet 5 mg, 5 mg, Oral, Q12H, Marybeth TOMY Case, PA, 5 mg at 02/08/17 0505  •  cholecalciferol (VITAMIN D3) tablet 1,000 Units, 1,000 Units, Oral, Daily, Marybeth TOMY Case, PA, 1,000 Units at 02/07/17 1054  •  dofetilide (TIKOSYN) capsule 500 mcg, 500 mcg, Oral, Q12H, Marybeth TOMY Case, PA, 500 mcg at 02/08/17 0806  •  metoprolol tartrate (LOPRESSOR) tablet 12.5 mg, 12.5 mg, Oral, TID, Marybeth TOMY Case, PA, 12.5 mg at 02/08/17 0505  •  pantoprazole (PROTONIX) EC tablet 40 mg, 40 mg, Oral, Q AM, Marybeth TOMY Case, PA, 40 mg at 02/07/17 1056  •  Pharmacy Consult, , Does not apply, Once PRN, Marybeth HUITRON Case, PA  •  potassium chloride (MICRO-K) CR capsule 40 mEq, 40 mEq, Oral, PRN **OR** potassium chloride (KLOR-CON) packet 40 mEq, 40 mEq, Oral, PRN, Marybeth TOMY Case, PA  •  vitamin B-12 (CYANOCOBALAMIN) tablet 100 mcg, 100 mcg, Oral, Daily, Marybeth TOMY Case, PA, 100 mcg at 02/07/17 1054    History of Present Illness    Feeling well this AM with no complaints.   Feeling well no issues and all questions answered    ROS   14 point ROS negative except as outlined in problem list, HPI and other parts of the note.    Procedures       Objective:       Vitals:    02/07/17 2016 02/08/17 0505 02/08/17 0507 02/08/17 0800   BP: 114/75 116/72 117/70 126/74   BP Location:    Right arm   Patient Position:    Lying   Pulse: 84 82 71 63   Resp:   20    Temp:   98 °F (36.7 °C)    TempSrc:   Oral    SpO2:       Weight:       Height:           Intake/Output Summary (Last 24 hours) at 02/08/17  0813  Last data filed at 02/08/17 0507   Gross per 24 hour   Intake    600 ml   Output      0 ml   Net    600 ml       GENERAL: Well-developed, well-nourished patient in no acute distress.  HEENT: Normocephalic, atraumatic, PERRLA. Moist mucous membranes.  NECK: No JVD present at 30°. No carotid bruits auscultated.  LUNGS: Clear to auscultation.  CARDIOVASCULAR: Heart has a regular rate and rhythm. No murmurs, gallops or rubs noted.   ABDOMEN: Soft, nontender. Positive bowel sounds.  MUSCULOSKELETAL: No gross deformities. No clubbing, cyanosis  EXT: pulses intact, no swelling  SKIN: Pink, warm  Neuro: Nonfocal exam. Gait intact    The patient's old records including ambulatory rhythm recordings (ECGs, Holter/event monitor) were reviewed and discussed.      Lab Review:     Results from last 7 days  Lab Units 02/06/17  1158   SODIUM mmol/L 141   POTASSIUM mmol/L 4.7   CHLORIDE mmol/L 107   TOTAL CO2 mmol/L 36.0*   BUN mg/dL 13   CREATININE mg/dL 0.90   GLUCOSE mg/dL 87   CALCIUM mg/dL 9.3               Results from last 7 days  Lab Units 02/06/17  1120   INR  0.97   APTT seconds 29.0           Results from last 7 days  Lab Units 02/06/17  1158   MAGNESIUM mg/dL 2.1   EKG SR with acceptable QTc      Assessment & Plan:     1. Symptomatic PAF s/p PVA and aflutter ablation in the past s/p RFA w/ failure of Flec and recurrence of Afib  - started on Tikosyn 500 mcg BID. QTc is ok this AM.   - Continue metoprolol and eliquis   -DC on Thursday afternoon if no issues.      ULICES Carey  02/08/17  8:13 AM    Answered multiple questions about redo point vein ablation recurrent atrial fibrillation that the patient and family members had and also by dofetilide.      IAdithya have reviewed the note in full and agree with all aspects of the above including physical exam, assessment, labs and plan with changes made accordingly.     Adithya Lynch DO  02/08/17  2:35 PM      EMR Dragon/Transcription disclaimer:  Much of  this encounter note is an electronic transcription/translation of spoken language to printed text. Electronic translation of spoken language may permit erroneous, or at times, nonsensical words or phrases to be inadvertently transcribed. Although I have reviewed the note for such errors, some may still exist.

## 2017-02-08 NOTE — PLAN OF CARE
Problem: Patient Care Overview (Adult)  Goal: Plan of Care Review    02/08/17 0424   Outcome Evaluation   Outcome Summary/Follow up Plan VSS, remains in NSR.   Coping/Psychosocial Response Interventions   Plan Of Care Reviewed With patient   Patient Care Overview   Progress no change

## 2017-02-08 NOTE — PROGRESS NOTES
Continued Stay Note  [facility]     Patient Name: Mike Knapp Jr.  MRN: [id number]  Today's Date: 2/8/2017    Admit Date: 2/7/2017          Discharge Plan       02/08/17 2634    Case Management/Social Work Plan    Plan Home    Patient/Family In Agreement With Plan yes    Additional Comments Pt states his wife took the $10 copay card to pharmacy to run the card. Per pt's wife, [facility] explained to her that the copay card would deduct total of $100 from his price of $399 for a total of $299. Offered Tikosyn assistance form and she states they make over the income to apply for assistance. She states he has a deductible of $3000 and is agreeable to pay the copay. She states once he has met the deductible of $3000, the medication should be free.  Discussed with pt at  and he is aware. [facility] will order Tikosyn today and have in stock tomorrow.                Discharge Codes     None        Expected Discharge Date and Time     Expected Discharge Date Expected Discharge Time    Feb 10, 2017             Christine Will

## 2017-02-09 VITALS
WEIGHT: 205.6 LBS | BODY MASS INDEX: 30.45 KG/M2 | SYSTOLIC BLOOD PRESSURE: 117 MMHG | DIASTOLIC BLOOD PRESSURE: 74 MMHG | HEART RATE: 66 BPM | HEIGHT: 69 IN | TEMPERATURE: 98.9 F | OXYGEN SATURATION: 97 % | RESPIRATION RATE: 18 BRPM

## 2017-02-09 LAB
ANION GAP SERPL CALCULATED.3IONS-SCNC: 4 MMOL/L (ref 3–11)
BUN BLD-MCNC: 15 MG/DL (ref 9–23)
BUN/CREAT SERPL: 15 (ref 7–25)
CALCIUM SPEC-SCNC: 9.4 MG/DL (ref 8.7–10.4)
CHLORIDE SERPL-SCNC: 107 MMOL/L (ref 99–109)
CO2 SERPL-SCNC: 30 MMOL/L (ref 20–31)
CREAT BLD-MCNC: 1 MG/DL (ref 0.6–1.3)
GFR SERPL CREATININE-BSD FRML MDRD: 75 ML/MIN/1.73
GLUCOSE BLD-MCNC: 90 MG/DL (ref 70–100)
MAGNESIUM SERPL-MCNC: 2 MG/DL (ref 1.3–2.7)
POTASSIUM BLD-SCNC: 4.3 MMOL/L (ref 3.5–5.5)
SODIUM BLD-SCNC: 141 MMOL/L (ref 132–146)

## 2017-02-09 PROCEDURE — 80048 BASIC METABOLIC PNL TOTAL CA: CPT

## 2017-02-09 PROCEDURE — 93010 ELECTROCARDIOGRAM REPORT: CPT | Performed by: INTERNAL MEDICINE

## 2017-02-09 PROCEDURE — 99238 HOSP IP/OBS DSCHRG MGMT 30/<: CPT | Performed by: PHYSICIAN ASSISTANT

## 2017-02-09 PROCEDURE — 93005 ELECTROCARDIOGRAM TRACING: CPT | Performed by: INTERNAL MEDICINE

## 2017-02-09 PROCEDURE — 83735 ASSAY OF MAGNESIUM: CPT

## 2017-02-09 RX ORDER — DOFETILIDE 0.5 MG/1
500 CAPSULE ORAL EVERY 12 HOURS SCHEDULED
Qty: 60 CAPSULE | Refills: 5 | Status: SHIPPED | OUTPATIENT
Start: 2017-02-09 | End: 2017-03-14 | Stop reason: SDUPTHER

## 2017-02-09 RX ADMIN — VITAMIN D, TAB 1000IU (100/BT) 1000 UNITS: 25 TAB at 08:50

## 2017-02-09 RX ADMIN — PANTOPRAZOLE SODIUM 40 MG: 40 TABLET, DELAYED RELEASE ORAL at 07:48

## 2017-02-09 RX ADMIN — METOPROLOL TARTRATE 12.5 MG: 25 TABLET ORAL at 04:44

## 2017-02-09 RX ADMIN — DOFETILIDE 500 MCG: 0.5 CAPSULE ORAL at 04:45

## 2017-02-09 RX ADMIN — APIXABAN 5 MG: 5 TABLET, FILM COATED ORAL at 04:44

## 2017-02-09 RX ADMIN — VITAM B12 100 MCG: 100 TAB at 08:51

## 2017-02-09 RX ADMIN — METOPROLOL TARTRATE 12.5 MG: 25 TABLET ORAL at 12:27

## 2017-02-09 NOTE — PROGRESS NOTES
[facility]  Cardiovascular Progress Note  Mike Knapp Jr.  N617/1  [id number]  1955    DATE OF ADMISSION: 2/7/2017  DATE OF FOLLOW UP:  2/9/17    Manuel Cooper MD    Subjective:     Patient ID: Mike Knapp Jr. is a 61 y.o. male.    Chief Complaint: afib f/u     Allergies   Allergen Reactions   • Sulfa Antibiotics Rash       Current Facility-Administered Medications:   •  apixaban (ELIQUIS) tablet 5 mg, 5 mg, Oral, Q12H, ULICES Noriega, 5 mg at 02/09/17 0444  •  cholecalciferol (VITAMIN D3) tablet 1,000 Units, 1,000 Units, Oral, Daily, UILCES Noriega, 1,000 Units at 02/07/17 1054  •  dofetilide (TIKOSYN) capsule 500 mcg, 500 mcg, Oral, Q12H, Adithya Alanizggal, DO, 500 mcg at 02/09/17 0445  •  metoprolol tartrate (LOPRESSOR) tablet 12.5 mg, 12.5 mg, Oral, TID, Marybeth A [facility], [place], 12.5 mg at 02/09/17 0444  •  pantoprazole (PROTONIX) EC tablet 40 mg, 40 mg, Oral, Q AM, Marybeth A [facility], [facility], 40 mg at 02/09/17 0748  •  potassium chloride (MICRO-K) CR capsule 40 mEq, 40 mEq, Oral, PRN **OR** potassium chloride (KLOR-CON) packet 40 mEq, 40 mEq, Oral, PRN, ULICES Noriega  •  vitamin B-12 (CYANOCOBALAMIN) tablet 100 mcg, 100 mcg, Oral, Daily, Marybeth A [facility], 100 mcg at 02/07/17 1054    History of Present Illness    Feeling well this AM with no complaints. Doing well overall no issues.     ROS   14 point ROS negative except as outlined in problem list, HPI and other parts of the note.    Procedures       Objective:       Vitals:    02/08/17 2000 02/08/17 2016 02/09/17 0434 02/09/17 0444   BP: 116/75 110/70 118/76 118/76   BP Location:       Patient Position:       Pulse: 66 67 72    Resp:  20 20    Temp:  98.4 °F (36.9 °C) 98.4 °F (36.9 °C)    TempSrc:  Oral Oral    SpO2:       Weight:       Height:           Intake/Output Summary (Last 24 hours) at 02/09/17 0844  Last data filed at 02/08/17 2016   Gross per 24 hour   Intake   1200 ml   Output     225 ml   Net    975 ml       GENERAL: Well-developed, well-nourished patient in no acute distress.  HEENT: Normocephalic, atraumatic, PERRLA. Moist mucous membranes.  NECK: No JVD present at 30°. No carotid bruits auscultated.  LUNGS: Clear to auscultation.  CARDIOVASCULAR: Heart has a regular rate and rhythm. No murmurs, gallops or rubs noted.   ABDOMEN: Soft, nontender. Positive bowel sounds.  MUSCULOSKELETAL: No gross deformities. No clubbing, cyanosis  EXT: pulses intact, no swelling  SKIN: Pink, warm  Neuro: Nonfocal exam. Gait intact    The patient's old records including ambulatory rhythm recordings (ECGs, Holter/event monitor) were reviewed and discussed.      Lab Review:     Results from last 7 days  Lab Units 02/09/17  0533 02/06/17  1158   SODIUM mmol/L 141 141   POTASSIUM mmol/L 4.3 4.7   CHLORIDE mmol/L 107 107   TOTAL CO2 mmol/L 30.0 36.0*   BUN mg/dL 15 13   CREATININE mg/dL 1.00 0.90   GLUCOSE mg/dL 90 87   CALCIUM mg/dL 9.4 9.3               Results from last 7 days  Lab Units 02/06/17  1120   INR  0.97   APTT seconds 29.0           Results from last 7 days  Lab Units 02/09/17  0533   MAGNESIUM mg/dL 2.0     EKG: NSR. Rate=75 QTc= 440ms     Assessment & Plan:     1. Symptomatic PAF s/p PVA and aflutter ablation in the past s/p RFA w/ failure of Flec and recurrence of Afib  - started on Tikosyn 500 mcg BID. QTc is ok this AM.   - Continue metoprolol and eliquis   - will DC this afternoon after EKG around noon if QTc remains ok.   - Follow up with me in one month          ULICES Carey  02/09/17  8:44 AM    IAdithya have reviewed the note in full and agree with all aspects of the above including physical exam, assessment, labs and plan with changes made accordingly.     Adithya Lynch DO  02/09/17  9:11 AM        EMR Dragon/Transcription disclaimer:  Much of this encounter note is an electronic transcription/translation of spoken language to printed text. Electronic translation of  spoken language may permit erroneous, or at times, nonsensical words or phrases to be inadvertently transcribed. Although I have reviewed the note for such errors, some may still exist.

## 2017-02-09 NOTE — PLAN OF CARE
Problem: Patient Care Overview (Adult)  Goal: Plan of Care Review    02/09/17 0422   Outcome Evaluation   Outcome Summary/Follow up Plan VSS, no complaints,.remains in NSR.   Coping/Psychosocial Response Interventions   Plan Of Care Reviewed With patient   Patient Care Overview   Progress no change

## 2017-02-10 ENCOUNTER — TELEPHONE (OUTPATIENT)
Dept: CARDIOLOGY | Facility: CLINIC | Age: 62
End: 2017-02-10

## 2017-02-10 NOTE — DISCHARGE SUMMARY
Date of Discharge:  2/9/17  Date of Admit: 2/7/2017    Manuel Cooper MD      Discharge Diagnosis:Active Problems:    Paroxysmal atrial fibrillation      Hospital Course:     Patient was admitted on 2/7/17 for tikosyn initiation. He was started on Tikosyn 500mcg BID. He maintained SR and QTc remained acceptable for the first 5 doses. He was discharged home with instructions to get an EKG on 2/10/17 and to continue his medications per discharged med rec.            Consults     No orders found from 1/9/2017 to 2/8/2017.          Discharge Physical Exam:    General Appearance No acute distress   Neck No adenopathy, supple, trachea midline, no thyromegaly, no carotid bruit, no JVD   Lungs Clear to auscultation,respirations regular, even and unlabored   Heart Regular rhythm and normal rate, normal S1 and S2, no murmur, no gallop, no rub, no click   Chest wall No abnormalities observed   Abdomen Normal bowel sounds, no masses, no hepatomegaly, soft   Extremities Moves all extremities well, no edema, no cyanosis, no redness   Neurological Alert and oriented x 3     Discharge Medications   Mike Knapp Jr.   Home Medication Instructions TONI:[id number]    Printed on:02/10/17 3410   Medication Information                      cholecalciferol (VITAMIN D3) 1000 UNITS tablet  Take 1,000 Units by mouth Daily.             Cyanocobalamin (VITAMIN B 12 PO)  Take 1 spray by mouth Daily.             dofetilide (TIKOSYN) 500 MCG capsule  Take 1 capsule by mouth Every 12 (Twelve) Hours for 180 days.             ELIQUIS 5 MG tablet tablet  TAKE 1 TABLET TWO TIMES A DAY             Flaxseed, Linseed, (FLAXSEED OIL PO)  Take 1 tablet by mouth Daily.             lansoprazole (PREVACID) 30 MG capsule  Take 30 mg by mouth daily.             melatonin 1 MG tablet  Take 1.5 mg by mouth Every Night.             metoprolol tartrate (LOPRESSOR) 25 MG tablet  Take 12.5 mg by mouth 3 (Three) Times a Day.                 Discharge Diet:  regular cardiac     Activity at Discharge: as tolerated     Discharge disposition: home    Condition on Discharge: stable    Follow-up Appointments  No future appointments.  Referrals and Follow-ups to Schedule     Dr. Lynch's office will call with a follow-up appointment.                  ULICES Carey  02/10/17  1:50 PM         IAdithya have reviewed the note in full and agree with all aspects of the above including physical exam, assessment, labs and plan with changes made accordingly.     Adithya Lynch DO  02/10/17  10:45 PM        30 min spent in reviewing records, discussion and examination of the patient and discussion with other members of the patient's medical team.     EMR Dragon/Transcription disclaimer:  Much of this encounter note is an electronic transcription/translation of spoken language to printed text. Electronic translation of spoken language may permit erroneous, or at times, nonsensical words or phrases to be inadvertently transcribed. Although I have reviewed the note for such errors, some may still exist.

## 2017-02-10 NOTE — TELEPHONE ENCOUNTER
Returning patient's call regarding going out of rhythm this morning around 11:30. His HR is is running in the 90s. He didn't answer when I returned his call. Left him a message to continue Tikosyn and call us back.

## 2017-02-13 NOTE — PAYOR COMM NOTE
"Erasmo Knapp Jr. (61 y.o. Male)   Auth # [id number]  Daysi Junior RN, BSN  Phone # [phone]  Fax # [phone]    Date of Birth Social Security Number Address Home Phone MRN    1955  [id number] [address] [phone] [id number]    Holiness Marital Status          [facility]        Admission Date Admission Type Admitting Provider Attending Provider Department, Room/Bed    2/7/17 Urgent Adithya Lynch DO  [facility] 6A, N617/1    Discharge Date Discharge Disposition Discharge Destination        2/9/2017 Home or Self Care             Attending Provider: (none)    Allergies:  Sulfa Antibiotics    Isolation:  None   Infection:  None   Code Status:  Not on file    Ht:  69\" (175.3 cm)   Wt:  205 lb 9.6 oz (93.3 kg)    Admission Cmt:  None   Principal Problem:  None                Active Insurance as of 2/7/2017     Primary Coverage     Payor Plan Insurance Group Employer/Plan Group    ANTHEM BLUE CROSS AN[employer]M BLUE CROSS BLUE SHIELD PPO [id number]     Payor Plan Address Payor Plan Phone Number Effective From Effective To    [address] [phone] 1/1/2015     [place] [address]       Subscriber Name Subscriber Birth Date Member ID       KARONERASMO SERVIN JR. 1955 [id number]                 Emergency Contacts      (Rel.) Home Phone Work Phone Mobile Phone    Tamar Knapp (Spouse) [phone] -- --            Insurance Information                ANTHEM BLUE CROSS/ANTHEM BLUE CROSS BLUE SHIELD PPO Phone: [phone]    Subscriber: Erasmo Knapp Jr. Subscriber#: [id number]    Group#: [id number] Precert#:              Discharge Summary      Adithya Lynch DO at 2/9/2017  1:54 PM          Date of Discharge:  2/9/17  Date of Admit: 2/7/2017    Manuel Cooper MD      Discharge Diagnosis:Active Problems:    Paroxysmal atrial fibrillation      Hospital Course:     Patient was admitted on 2/7/17 for tikosyn " initiation. He was started on Tikosyn 500mcg BID. He maintained SR and QTc remained acceptable for the first 5 doses. He was discharged home with instructions to get an EKG on 2/10/17 and to continue his medications per discharged med rec.            Consults     No orders found from 1/9/2017 to 2/8/2017.          Discharge Physical Exam:    General Appearance No acute distress   Neck No adenopathy, supple, trachea midline, no thyromegaly, no carotid bruit, no JVD   Lungs Clear to auscultation,respirations regular, even and unlabored   Heart Regular rhythm and normal rate, normal S1 and S2, no murmur, no gallop, no rub, no click   Chest wall No abnormalities observed   Abdomen Normal bowel sounds, no masses, no hepatomegaly, soft   Extremities Moves all extremities well, no edema, no cyanosis, no redness   Neurological Alert and oriented x 3     Discharge Medications   Mike Knapp Jr.   Home Medication Instructions TONI:[id number]    Printed on:02/10/17 1350   Medication Information                      cholecalciferol (VITAMIN D3) 1000 UNITS tablet  Take 1,000 Units by mouth Daily.             Cyanocobalamin (VITAMIN B 12 PO)  Take 1 spray by mouth Daily.             dofetilide (TIKOSYN) 500 MCG capsule  Take 1 capsule by mouth Every 12 (Twelve) Hours for 180 days.             ELIQUIS 5 MG tablet tablet  TAKE 1 TABLET TWO TIMES A DAY             Flaxseed, Linseed, (FLAXSEED OIL PO)  Take 1 tablet by mouth Daily.             lansoprazole (PREVACID) 30 MG capsule  Take 30 mg by mouth daily.             melatonin 1 MG tablet  Take 1.5 mg by mouth Every Night.             metoprolol tartrate (LOPRESSOR) 25 MG tablet  Take 12.5 mg by mouth 3 (Three) Times a Day.                 Discharge Diet: regular cardiac     Activity at Discharge: as tolerated     Discharge disposition: home    Condition on Discharge: stable    Follow-up Appointments  No future appointments.  Referrals and Follow-ups to Schedule       Cris's office will call with a follow-up appointment.                  ULICES Carey  02/10/17  1:50 PM         IAdithya have reviewed the note in full and agree with all aspects of the above including physical exam, assessment, labs and plan with changes made accordingly.     Adithya Lynch DO  02/10/17  10:45 PM        30 min spent in reviewing records, discussion and examination of the patient and discussion with other members of the patient's medical team.     EMR Dragon/Transcription disclaimer:  Much of this encounter note is an electronic transcription/translation of spoken language to printed text. Electronic translation of spoken language may permit erroneous, or at times, nonsensical words or phrases to be inadvertently transcribed. Although I have reviewed the note for such errors, some may still exist.     Electronically signed by Adithya Lynch DO at 2/10/2017 10:45 PM        Discharge Order     Start     Ordered    02/09/17 1244  Discharge patient  Once     Expected Discharge Date:  02/09/17    Discharge Disposition:  Home or Self Care        02/09/17 1243

## 2017-02-14 ENCOUNTER — TELEPHONE (OUTPATIENT)
Dept: CARDIOLOGY | Facility: CLINIC | Age: 62
End: 2017-02-14

## 2017-02-14 NOTE — TELEPHONE ENCOUNTER
Patient called to let you know that the Tikosyn is not working. He said that this medication does not work any better than the others. He had an episode yesterday from 2-9 pm. Please advise.

## 2017-03-09 ENCOUNTER — OFFICE VISIT (OUTPATIENT)
Dept: CARDIOLOGY | Facility: CLINIC | Age: 62
End: 2017-03-09

## 2017-03-09 VITALS
BODY MASS INDEX: 30.69 KG/M2 | HEART RATE: 85 BPM | DIASTOLIC BLOOD PRESSURE: 80 MMHG | WEIGHT: 207.2 LBS | HEIGHT: 69 IN | SYSTOLIC BLOOD PRESSURE: 114 MMHG

## 2017-03-09 DIAGNOSIS — I48.0 PAROXYSMAL ATRIAL FIBRILLATION (HCC): Primary | ICD-10-CM

## 2017-03-09 DIAGNOSIS — R07.89 OTHER CHEST PAIN: ICD-10-CM

## 2017-03-09 PROCEDURE — 99213 OFFICE O/P EST LOW 20 MIN: CPT | Performed by: INTERNAL MEDICINE

## 2017-03-09 PROCEDURE — 93000 ELECTROCARDIOGRAM COMPLETE: CPT | Performed by: INTERNAL MEDICINE

## 2017-03-09 NOTE — PROGRESS NOTES
Subjective:   Mike Knapp Jr.  1955  [phone]  [phone]    03/09/2017    [facility]    Manuel Cooper MD  [address] [place] [address] [address] [place] [address]    REFERRING DOCTOR: Serg Valladares MD       Patient ID: Mike Knapp Jr. is a 61 y.o. male.    Chief Complaint:   Chief Complaint   Patient presents with   • Follow-up     Problem List:    1. Chest pain:  a. Symptoms of twinges of chest pain occurring at rest x3 months.   b. Exercise GXT, Dr. Welch: Exercise duration 4 minutes and 23 seconds with no evidence of inducible ischemia.   c. Echocardiogram, 05/27/2014, Dr. Welch, revealing normal left ventricular ejection fraction (60%) with normal valvular structures.   2. Atrial flutter/atrial fibrillation:  a. Documentation by EKG, 08/25/2014.  b. Patient is status post EP study and typical flutter ablation on 10/17/2014.   c. On 04/19/2014, patient noted palpitations at home and went to the emergency department. On telemetry monitor at the outside hospital, patient was noted to be in type 1 atrial fibrillation with rates of about 140 beats per minute. There was no atrial flutter noted on the telemetry strip or telemetry EKGs at this hospitalization.  d. Patient was started on sotalol 80 mg b.i.d. and continued on his Xarelto 20 mg once a day for this new onset detection of atrial fibrillation, status post atrial flutter ablation. CHADS score = 0 with questionable history of hypertension.   e. Patient is currently only on aspirin due to the CHADS score of 0 and per patient’s request even after explanation of his CHADS score of possibly 1 with questionable hypertension and recurrent episode of atrial fibrillation as noted. The atrial fibrillation is very short-lived in nature.  f. Patient with continued episodes of short-lived atrial fibrillation even on the sotalol. Patient has stated that he has tried to taper off his sotalol, and whenever he is  decreased to 40 mg of the sotalol, he states he has had recurrence of his atrial fibrillation with RVR.  g. Patient states that he thinks the sotalol has been causing him symptoms of feeling drained and also a heavy pressure throughout his body is noted.   h. Patient’s sotalol has been discontinued, and he is currently maintained on flecainide 50 mg b.i.d. and metoprolol 12.5 mg b.i.d. He is currently on aspirin, since he has a CHADS score of 0 with no long episodes of atrial fibrillation at the present time.   i. Patient maintained currently on flecainide 50 mg b.i.d. and aspirin with pill-in-the-pocket Xarelto. Patient has a CHADS-VASc score of 0. Patient states his last episodes of atrial fibrillation were in July and September, which lasted about 1 to 1-1/2 hours.   j. Tikosyn 2/2017 with recurrent Afib and PACs  k. Jan 2017 Zio patch showed recurrent episodes of symptomatic atrial arrhythmias including atrial fibrillation.  3. Thrombocytopenia:  a. Platelet count in October 2015 was 217 and most recent 92.  4. Hyperlipidemia.   5. Generalized anxiety disorder.   6. History of Clemons's esophagus/gastroesophageal reflux disease.   7. Osteoarthritis.   8. Recent colitis in 2014.  9. Left ear tinnitus secondary to a viral illness.   10. Benign lung cyst.     Allergies   Allergen Reactions   • Sulfa Antibiotics Rash       Current Outpatient Prescriptions:   •  cholecalciferol (VITAMIN D3) 1000 UNITS tablet, Take 1,000 Units by mouth Daily., Disp: , Rfl:   •  Cyanocobalamin (VITAMIN B 12 PO), Take 1 spray by mouth Daily., Disp: , Rfl:   •  dofetilide (TIKOSYN) 500 MCG capsule, Take 1 capsule by mouth Every 12 (Twelve) Hours for 180 days., Disp: 60 capsule, Rfl: 5  •  ELIQUIS 5 MG tablet tablet, TAKE 1 TABLET TWO TIMES A DAY, Disp: 60 tablet, Rfl: 11  •  Flaxseed, Linseed, (FLAXSEED OIL PO), Take 1 tablet by mouth Daily., Disp: , Rfl:   •  lansoprazole (PREVACID) 30 MG capsule, Take 30 mg by mouth daily., Disp: ,  "Rfl:   •  melatonin 1 MG tablet, Take 1.5 mg by mouth Every Night., Disp: , Rfl:   •  metoprolol tartrate (LOPRESSOR) 25 MG tablet, Take 12.5 mg by mouth 3 (Three) Times a Day., Disp: , Rfl:     History of Present Illness    Patient presents today for follow-up after Tikosyn loading one month ago. He has continued to have recurrent afib with some episodes lasting as long as 7 hours. Sometimes he feels that he may be having PACs instead of afib and other times feels like it the atrial fibrillation.  Looking at his tracking of atrial fibrillation seems that occurs once per week however now less duration than prior.    No issues with chest pain, shortness of breath, fevers, chills, night sweats, PND, orthopnea or palpitations. No recent ER visits or hospital stays.      The following portions of the patient's history were reviewed and updated as appropriate: allergies, current medications, past family history, past medical history, past social history, past surgical history and problem list.    ROS   14 point ROS negative except as outlined in problem list, HPI and other parts of the note.      ECG 12 Lead  Date/Time: 3/9/2017 10:14 AM  Performed by: OLGA ESPINOSA  Authorized by: OLGA ESPINOSA   Comparison: compared with previous ECG   Rhythm: sinus rhythm  Rate: normal  BPM: 85  Conduction: conduction normal  ST Segments: ST segments normal  T Waves: T waves normal  QRS axis: normal  Other: no other findings  Clinical impression: normal ECG               Objective:       Vitals:    03/09/17 1005   BP: 114/80   BP Location: Left arm   Patient Position: Sitting   Pulse: 85   Weight: 207 lb 3.2 oz (94 kg)   Height: 69\" (175.3 cm)       GENERAL: Well-developed, well-nourished patient in no acute distress.  HEENT: Normocephalic, atraumatic, PERRLA. Moist mucous membranes.  NECK: No JVD present at 30°. No carotid bruits auscultated.  LUNGS: Clear to auscultation.  CARDIOVASCULAR: Heart has a regular rate and rhythm. No " murmurs, gallops or rubs noted.   ABDOMEN: Soft, nontender. Positive bowel sounds.  MUSCULOSKELETAL: No gross deformities. No clubbing, cyanosis, or lower extremity edema.  SKIN: Pink, warm  Neuro: Nonfocal exam. Gait intact  Ext: No edema or bruising    The patient's old records including ambulatory rhythm recordings (ECGs, Holter/event monitor) were reviewed and discussed.      Lab Review:   Results for orders placed or performed during the hospital encounter of 02/07/17   Basic Metabolic Panel   Result Value Ref Range    Glucose 90 70 - 100 mg/dL    BUN 15 9 - 23 mg/dL    Creatinine 1.00 0.60 - 1.30 mg/dL    Sodium 141 132 - 146 mmol/L    Potassium 4.3 3.5 - 5.5 mmol/L    Chloride 107 99 - 109 mmol/L    CO2 30.0 20.0 - 31.0 mmol/L    Calcium 9.4 8.7 - 10.4 mg/dL    eGFR Non African Amer 75 >60 mL/min/1.73    BUN/Creatinine Ratio 15.0 7.0 - 25.0    Anion Gap 4.0 3.0 - 11.0 mmol/L   Magnesium   Result Value Ref Range    Magnesium 2.0 1.3 - 2.7 mg/dL           Diagnosis:   1. Paroxysmal atrial fibrillation  2. Atypical chest pain      Assessment & Plan:     1.  Paroxysmal AFib with previous typical atrial flutter ablation and point vein ablation in the past.  s/p Tikosyn loading 2/2017 and continuing to have recurrent symptomatic episodes consistent with skipped beats and others consistent with may be atrial fibrillation.  Seems that the duration of how long they last which is deftly less but still having them once a week.  He is on low dose metoprolol 12.5 mg TID.  Long discussion with the patient about redo point vein ablation versus continuing medications.  For now he wants to try to just increase his metoprolol as I advised him to increase to 25 mg twice a day and then change over to 37.5 mg twice a day.  We'll continue on his dofetilide as well for the current time.  If he was to continue to have recurrent episodes of symptomatic in nature then at that time consideration for redo ablation.    2.  Atypical chest  pain.  Been sometimes the patient had a stress test in 3 years prior he was only on treadmill and then had to be stopped due to tachycardia.  Therefore we'll proceed with a treadmill thallium stress test in near future.    3. Follow up in 2-3 mths       CC: Manuel Lynch,   03/09/17  11:14 AM      EMR Dragon/Transcription disclaimer:  Much of this encounter note is an electronic transcription/translation of spoken language to printed text. Electronic translation of spoken language may permit erroneous, or at times, nonsensical words or phrases to be inadvertently transcribed. Although I have reviewed the note for such errors, some may still exist.

## 2017-03-14 RX ORDER — DOFETILIDE 0.5 MG/1
CAPSULE ORAL
Qty: 60 CAPSULE | Refills: 5 | Status: SHIPPED | OUTPATIENT
Start: 2017-03-14 | End: 2017-09-20 | Stop reason: HOSPADM

## 2017-03-16 ENCOUNTER — TELEPHONE (OUTPATIENT)
Dept: CARDIOLOGY | Facility: CLINIC | Age: 62
End: 2017-03-16

## 2017-03-16 NOTE — TELEPHONE ENCOUNTER
Patient called to let us know that he has been in A-fib since 1:00 this morning and that he has been off of the Metoprolol for 24 hours. He is scheduled for a stress test in the morning. I spoke with Dr. Lynch and he said to see how he feels in the morning. If he is in NSR in the morning to just come on in as scheduled, but if he is out of rhythm to call us. Patient notified and aware of instructions.

## 2017-03-17 ENCOUNTER — HOSPITAL ENCOUNTER (OUTPATIENT)
Dept: CARDIOLOGY | Facility: HOSPITAL | Age: 62
Discharge: HOME | End: 2017-03-17
Attending: INTERNAL MEDICINE

## 2017-03-17 ENCOUNTER — HOSPITAL ENCOUNTER (OUTPATIENT)
Dept: CARDIOLOGY | Facility: HOSPITAL | Age: 62
Discharge: HOME | End: 2017-03-17
Attending: INTERNAL MEDICINE | Admitting: INTERNAL MEDICINE

## 2017-03-17 DIAGNOSIS — R07.89 OTHER CHEST PAIN: ICD-10-CM

## 2017-03-17 LAB
BH CV STRESS BP STAGE 1: NORMAL
BH CV STRESS BP STAGE 3: NORMAL
BH CV STRESS COMMENTS STAGE 1: NORMAL
BH CV STRESS COMMENTS STAGE 2: NORMAL
BH CV STRESS DOSE REGADENOSON STAGE 1: 0.4
BH CV STRESS DURATION MIN STAGE 1: 1
BH CV STRESS DURATION MIN STAGE 2: 1
BH CV STRESS DURATION MIN STAGE 3: 1
BH CV STRESS DURATION MIN STAGE 4: 1
BH CV STRESS DURATION SEC STAGE 1: 0
BH CV STRESS DURATION SEC STAGE 2: 0
BH CV STRESS DURATION SEC STAGE 3: 0
BH CV STRESS DURATION SEC STAGE 4: 0
BH CV STRESS HR STAGE 1: 148
BH CV STRESS HR STAGE 2: 150
BH CV STRESS HR STAGE 3: 146
BH CV STRESS HR STAGE 4: 137
BH CV STRESS PROTOCOL 1: NORMAL
BH CV STRESS RECOVERY BP: NORMAL MMHG
BH CV STRESS RECOVERY HR: 125 BPM
BH CV STRESS STAGE 1: 1
BH CV STRESS STAGE 2: 2
BH CV STRESS STAGE 3: 3
BH CV STRESS STAGE 4: 4
LV EF NUC BP: 70 %
MAXIMAL PREDICTED HEART RATE: 157 BPM
PERCENT MAX PREDICTED HR: 95.54 %
STRESS BASELINE BP: NORMAL MMHG
STRESS BASELINE HR: 121 BPM
STRESS PERCENT HR: 112 %
STRESS POST ESTIMATED WORKLOAD: 1 METS
STRESS POST EXERCISE DUR MIN: 4 MIN
STRESS POST EXERCISE DUR SEC: 0 SEC
STRESS POST PEAK BP: NORMAL MMHG
STRESS POST PEAK HR: 150 BPM
STRESS TARGET HR: 133 BPM

## 2017-03-17 PROCEDURE — 25010000002 REGADENOSON 0.4 MG/5ML SOLUTION: Performed by: INTERNAL MEDICINE

## 2017-03-17 PROCEDURE — 78451 HT MUSCLE IMAGE SPECT SING: CPT

## 2017-03-17 PROCEDURE — 0 TECHNETIUM SESTAMIBI: Performed by: INTERNAL MEDICINE

## 2017-03-17 PROCEDURE — A9500 TC99M SESTAMIBI: HCPCS | Performed by: INTERNAL MEDICINE

## 2017-03-17 PROCEDURE — 93017 CV STRESS TEST TRACING ONLY: CPT

## 2017-03-17 PROCEDURE — 93018 CV STRESS TEST I&R ONLY: CPT | Performed by: INTERNAL MEDICINE

## 2017-03-17 PROCEDURE — 78452 HT MUSCLE IMAGE SPECT MULT: CPT | Performed by: INTERNAL MEDICINE

## 2017-03-17 RX ORDER — METOPROLOL TARTRATE 5 MG/5ML
5 INJECTION INTRAVENOUS ONCE
Status: COMPLETED | OUTPATIENT
Start: 2017-03-17 | End: 2017-03-17

## 2017-03-17 RX ADMIN — Medication 1 DOSE: at 10:15

## 2017-03-17 RX ADMIN — Medication 1 DOSE: at 08:15

## 2017-03-17 RX ADMIN — METOPROLOL TARTRATE 5 MG: 1 INJECTION, SOLUTION INTRAVENOUS at 11:11

## 2017-03-17 RX ADMIN — REGADENOSON 0.4 MG: 0.08 INJECTION, SOLUTION INTRAVENOUS at 10:16

## 2017-06-12 ENCOUNTER — OFFICE VISIT (OUTPATIENT)
Dept: CARDIOLOGY | Facility: CLINIC | Age: 62
End: 2017-06-12

## 2017-06-12 VITALS
DIASTOLIC BLOOD PRESSURE: 82 MMHG | HEART RATE: 79 BPM | WEIGHT: 210.4 LBS | HEIGHT: 69 IN | BODY MASS INDEX: 31.16 KG/M2 | SYSTOLIC BLOOD PRESSURE: 116 MMHG

## 2017-06-12 DIAGNOSIS — E78.49 OTHER HYPERLIPIDEMIA: ICD-10-CM

## 2017-06-12 DIAGNOSIS — I48.0 PAROXYSMAL ATRIAL FIBRILLATION (HCC): Primary | ICD-10-CM

## 2017-06-12 PROCEDURE — 93000 ELECTROCARDIOGRAM COMPLETE: CPT | Performed by: INTERNAL MEDICINE

## 2017-06-12 PROCEDURE — 99213 OFFICE O/P EST LOW 20 MIN: CPT | Performed by: INTERNAL MEDICINE

## 2017-06-12 NOTE — PROGRESS NOTES
Subjective:   Mike Knapp Jr.  1955  [phone]  [phone]    06/12/2017    [facility] CARDIOLOGY    Manuel Cooper MD  [address] [place] [address] [address] [place] [address]    REFERRING DOCTOR: Dr Serg Valladares      Patient ID: Mike Knapp Jr. is a 61 y.o. male.    Chief Complaint: Afib    Problem List:  Problem List:     1. Chest pain:  a. Symptoms of twinges of chest pain occurring at rest x3 months.   b. Exercise GXT, Dr. Welch: Exercise duration 4 minutes and 23 seconds with no evidence of inducible ischemia.   c. Echocardiogram, 05/27/2014, Dr. Welch, revealing normal left ventricular ejection fraction (60%) with normal valvular structures.   2. Atrial flutter/atrial fibrillation:  a. Documentation by EKG, 08/25/2014.  b. Patient is status post EP study and typical flutter ablation on 10/17/2014.   c. On 04/19/2014, patient noted palpitations at home and went to the emergency department. On telemetry monitor at the outside hospital, patient was noted to be in type 1 atrial fibrillation with rates of about 140 beats per minute. There was no atrial flutter noted on the telemetry strip or telemetry EKGs at this hospitalization.  d. Patient was started on sotalol 80 mg b.i.d. and continued on his Xarelto 20 mg once a day for this new onset detection of atrial fibrillation, status post atrial flutter ablation. CHADS score = 0 with questionable history of hypertension.   e. Patient is currently only on aspirin due to the CHADS score of 0 and per patient’s request even after explanation of his CHADS score of possibly 1 with questionable hypertension and recurrent episode of atrial fibrillation as noted. The atrial fibrillation is very short-lived in nature.  f. Patient with continued episodes of short-lived atrial fibrillation even on the sotalol. Patient has stated that he has tried to taper off his sotalol, and whenever he is decreased to 40 mg of the sotalol, he  states he has had recurrence of his atrial fibrillation with RVR.  g. Patient states that he thinks the sotalol has been causing him symptoms of feeling drained and also a heavy pressure throughout his body is noted.   h. Patient’s sotalol has been discontinued, and he is currently maintained on flecainide 50 mg b.i.d. and metoprolol 12.5 mg b.i.d. He is currently on aspirin, since he has a CHADS score of 0 with no long episodes of atrial fibrillation at the present time.   i. Patient maintained currently on flecainide 50 mg b.i.d. and aspirin with pill-in-the-pocket Xarelto. Patient has a CHADS-VASc score of 0. Patient states his last episodes of atrial fibrillation were in July and September, which lasted about 1 to 1-1/2 hours.   j. Tikosyn 2/2017 with recurrent Afib and PACs  k. Jan 2017 Zio patch showed recurrent episodes of symptomatic atrial arrhythmias including atrial fibrillation.  3. Thrombocytopenia:  a. Platelet count in October 2015 was 217 and most recent 92.  4. Hyperlipidemia.   5. Generalized anxiety disorder.   6. History of Clemons's esophagus/gastroesophageal reflux disease.   7. Osteoarthritis.   8. Recent colitis in 2014.  9. Left ear tinnitus secondary to a viral illness.   10. Benign lung cyst.     Allergies   Allergen Reactions   • Sulfa Antibiotics Rash       Current Outpatient Prescriptions:   •  cholecalciferol (VITAMIN D3) 1000 UNITS tablet, Take 1,000 Units by mouth Daily., Disp: , Rfl:   •  Cyanocobalamin (VITAMIN B 12 PO), Take 1 spray by mouth Daily., Disp: , Rfl:   •  dofetilide (TIKOSYN) 500 MCG capsule, TAKE 1 CAPSULE TWO TIMES A DAY, Disp: 60 capsule, Rfl: 5  •  ELIQUIS 5 MG tablet tablet, TAKE 1 TABLET TWO TIMES A DAY, Disp: 60 tablet, Rfl: 11  •  Flaxseed, Linseed, (FLAXSEED OIL PO), Take 1 tablet by mouth Daily., Disp: , Rfl:   •  lansoprazole (PREVACID) 30 MG capsule, Take 30 mg by mouth daily., Disp: , Rfl:   •  melatonin 1 MG tablet, Take 1.5 mg by mouth Every Night.,  "Disp: , Rfl:   •  metoprolol tartrate (LOPRESSOR) 25 MG tablet, Take 12.5 mg by mouth 3 (Three) Times a Day., Disp: , Rfl:     History of Present Illness  Patient presents today for follow-up after Tikosyn loading one month ago. He has continued to have recurrent afib with some episodes lasting as long as 7 hours. Sometimes he feels that he may be having PACs instead of afib and other times feels like it the atrial fibrillation. Looking at his tracking of atrial fibrillation seems that occurs once per week however now less duration than prior. Stress test near last visit was negative for ischemia. Symptoms include palpitations and shortness of breath.     No issues with chest pain, shortness of breath, fevers, chills, night sweats, PND, orthopnea or palpitations. No recent ER visits or hospital stays.      The following portions of the patient's history were reviewed and updated as appropriate: allergies, current medications, past family history, past medical history, past social history, past surgical history and problem list.    ROS   14 point ROS negative except as outlined in problem list, HPI and other parts of the note.      ECG 12 Lead  Date/Time: 6/12/2017 11:36 AM  Performed by: MICHELINE CHILDERS  Authorized by: MICHELINE CHILDERS   Comparison: compared with previous ECG from 3/9/2017  Comparison to previous ECG: No change  Rhythm: sinus rhythm  Comments: HR 79 bpm  QTc ok               Objective:       Vitals:    06/12/17 1038   BP: 116/82   BP Location: Left arm   Patient Position: Sitting   Pulse: 79   Weight: 210 lb 6.4 oz (95.4 kg)   Height: 69\" (175.3 cm)       GENERAL: Well-developed, well-nourished patient in no acute distress.  HEENT: Normocephalic, atraumatic, PERRLA. Moist mucous membranes.  NECK: No JVD present at 30°. No carotid bruits auscultated.  LUNGS: Clear to auscultation.  CARDIOVASCULAR: Heart has a regular rate and rhythm. No murmurs, gallops or rubs noted.   ABDOMEN: Soft, nontender. " Positive bowel sounds.  MUSCULOSKELETAL: No gross deformities. No clubbing, cyanosis, or lower extremity edema.  SKIN: Pink, warm  Neuro: Nonfocal exam. Gait intact  Ext: No edema or bruising    The patient's old records including ambulatory rhythm recordings (ECGs, Holter/event monitor) were reviewed and discussed.      Lab Review:   Results for orders placed or performed during the hospital encounter of 03/17/17   Stress Test With Myocardial Perfusion (1 Day)   Result Value Ref Range    Target HR (85%) 133 bpm    Max. Pred. HR (100%) 157 bpm    BH CV STRESS PROTOCOL 1 Pharmacologic     Stage 1 1     HR Stage 1 148     BP Stage 1 120/80     Duration Min Stage 1 1     Duration Sec Stage 1 0     Stress Dose Regadenoson Stage 1 0.4     Stress Comments Stage 1 15 sec bolus injection     Stage 2 2     HR Stage 2 150     Duration Min Stage 2 1     Duration Sec Stage 2 0     Stage 3 3     HR Stage 3 146     BP Stage 3 120/74     Duration Min Stage 3 1     Duration Sec Stage 3 0     Stage 4 4     HR Stage 4 137     Duration Min Stage 4 1     Duration Sec Stage 4 0     Baseline  bpm    Baseline /80 mmHg    Peak  bpm    Percent Max Pred HR 95.54 %    Percent Target  %    Peak /70 mmHg    Recovery  bpm    Recovery /70 mmHg    Exercise duration (min) 4 min    Exercise duration (sec) 0 sec    Estimated workload 1.0 METS    Stress Comments Stage 2 Peak B/P is kristy pressure     Nuc Stress EF 70 %           Diagnosis:   1. Paroxysmal atrial fibrillation  2. Hyperlipidemia      Assessment & Plan:   1. Paroxysmal AFib with previous typical atrial flutter ablation and pulm vein ablation in the past. s/p Tikosyn loading 2/2017 and continuing to have recurrent symptomatic episodes consistent with skipped beats and others consistent with may be atrial fibrillation. Seems that the duration of how long they last which is def less but still having them once a week and lasting up to 6-8 hrs. We'll  continue on his dofetilide as well for the current time and current dose metoprolol. I think now it may be time to consider redo PVA in the near future. He will discuss with his wife and make the decision in the near future and call us in the near future with what he decides.  The risks, benefits, and alternatives of the procedure have been reviewed and the patient wishes to proceed. Follow up in 5 mths or sooner as needed    CC: [facility] Ramon Lynch DO  06/12/17  11:35 AM      EMR Dragon/Transcription disclaimer:  Much of this encounter note is an electronic transcription/translation of spoken language to printed text. Electronic translation of spoken language may permit erroneous, or at times, nonsensical words or phrases to be inadvertently transcribed. Although I have reviewed the note for such errors, some may still exist.

## 2017-07-18 ENCOUNTER — TELEPHONE (OUTPATIENT)
Dept: CARDIOLOGY | Facility: CLINIC | Age: 62
End: 2017-07-18

## 2017-07-18 DIAGNOSIS — I48.0 PAF (PAROXYSMAL ATRIAL FIBRILLATION) (HCC): Primary | ICD-10-CM

## 2017-07-18 NOTE — TELEPHONE ENCOUNTER
Per your discussion with him on 6/12/17, Mr. Knapp has discussed with his wife, and is ready to have his redo PVA.  I've ordered it, and a CTA.      Do you want a EUSEBIA?  How long to hold Tikosyn?

## 2017-08-15 PROBLEM — I48.0 PAF (PAROXYSMAL ATRIAL FIBRILLATION) (HCC): Status: ACTIVE | Noted: 2017-08-15

## 2017-08-29 ENCOUNTER — PREP FOR SURGERY (OUTPATIENT)
Dept: OTHER | Facility: HOSPITAL | Age: 62
End: 2017-08-29

## 2017-08-29 DIAGNOSIS — I48.0 PAROXYSMAL ATRIAL FIBRILLATION (HCC): Primary | ICD-10-CM

## 2017-09-15 NOTE — NURSING NOTE
PRE-PVA ASSESSMENT  Mike Knapp Jr. 1955   [address] [place] [address]   [phone] (home) [phone] (work)      Information obtained from: [x] Medical record review  [] Patient report  Scheduled for: Redo PVA on 9/19/17 with Dr Lynch    AFib Specific History:  AFib Type: paroxysmal  [id number] Score: 0 Contributing Factors: None   Anticoagulation: Eliquis 5mg BID -NO MISSED DOSES  Cardioversion x 0  Failed AAD(s): sotalol, flecainide, Tikosyn  Prior Ablation: Typical isthmus dependent CCW RA flutter ablation 10/17/14                           PVA 5/24/2016 w/RFA of all 4 PVs & LA roof    Is Mr. Knapp aware of his AFib? Yes   Onset: 4/19/14  Exacerbations: exercise   Frequency: Q 1-2wks   Alleviations:     Duration: min to 8hrs     Symptoms:   [x] Palpitations:     [] Chest Discomfort:    [x] Dizziness:    [] Presyncope:    [] Lightheadedness:   [] Syncope:    [x] Fatigue:    [] Other:     [x] Short of Breath:     Last Echo(s):  [x] TTE Date: 5/27/14         [x] EUSEBIA Date: 5/24/16       EF: 60%     EF: 56%        LA: 3.1cm    LA: 4.5cm, suspected PFO        VHD? None   VHD? None     Past medical History:   [] Diabetes -NO          Hemoglobin A1C   Date Value Ref Range Status   05/23/2016 4.6 4.00 - 6.00 % Final     Comment:     The American Diabetes Association recommends maintenance of Hemoglobin  A1C at 7.0% or lower. Goals for Hemoglobin A1C reduction may need to be  modified if hypoglycemia is a problem.     10/16/2014 4.8 4.00 - 6.00 % Final     Comment:     DF by IF @ 10/16/2014 14:46  The American Diabetes Association recommends maintenance of Hemoglobin A1C at 7.0% or lower.  Goals for Hemoglobin A1C reduction may need to be modified if hypoglycemia is a problem.           [] HYPOthyroidism  [] HYPERthyroidism -NO         TSH   Date Value Ref Range Status   05/23/2016 2.272 0.350 - 5.350 UIU/mL Final     Comment:     Specimens containing fluorescein can produce falsely  "depressed values  with this assay.  Patients undergoing fluorescein dye angiography should  wait 72 hours post-treatment before testing.         [] HTN  -NO    [] Heart Failure -NO   [] CVA -NO                              [] TIA -NO        [] Ischemic         [] Hemorrhagic         [] Nonischemic         [] Embolic        [] Diastolic    [] CAD -NO        [] MI -NO         [x] Dyslipidemia    [x] Ischemic Evaluation       [x] Stress Test:  3/17/17:  Within normal limits, EF 70%       [] Heart Cath:     [] Sleep Apnea Diagnosed -NO    [] Sleep Apnea Suspected -NO     [x] Obesity       [x] BMI 30-35 (31.1 on 6/12/17)        [] BMI >35        [] Weight reduction discussed with Mr. Knapp         [] Nutrition Consult            [] Declined by Mr. Knapp     [] Depression   [x] Anxiety -CLAUDIA       Tx? 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 Knapp about his upcoming PVA.  He was well informed about the procedure from prior discussion with Dr Lynch, from reading the provided literature and from his prior ablation.  I answered a few remaining questions.  Mr. Knapp verbalized understanding and he is ready to proceed.  He does have some requests, which I will discuss with Dr. Lynch:   1). He requests a scoplamine patch as he \"didn't tolerate anesthesia well lat time.\"   2). Request phenergan instead of zofran, as he felt zofran was not effective for him   3). Requests a pain " "pill & ice on his groins prior to pulling sheaths, as he \"nearly passed out from the pain last time.\"   4). He requests a condom cath rather than a traditional multani.     Thao Reardon, CARLOSN, RN           "

## 2017-09-18 ENCOUNTER — TRANSCRIBE ORDERS (OUTPATIENT)
Dept: LAB | Facility: HOSPITAL | Age: 62
End: 2017-09-18

## 2017-09-18 ENCOUNTER — ANESTHESIA EVENT (OUTPATIENT)
Dept: CARDIOLOGY | Facility: HOSPITAL | Age: 62
End: 2017-09-18

## 2017-09-18 ENCOUNTER — HOSPITAL ENCOUNTER (OUTPATIENT)
Dept: CT IMAGING | Facility: HOSPITAL | Age: 62
Discharge: HOME | End: 2017-09-18
Attending: INTERNAL MEDICINE | Admitting: INTERNAL MEDICINE

## 2017-09-18 ENCOUNTER — LAB (OUTPATIENT)
Dept: LAB | Facility: HOSPITAL | Age: 62
End: 2017-09-18

## 2017-09-18 ENCOUNTER — APPOINTMENT (OUTPATIENT)
Dept: PREADMISSION TESTING | Facility: HOSPITAL | Age: 62
End: 2017-09-18

## 2017-09-18 DIAGNOSIS — Q87.89: Primary | ICD-10-CM

## 2017-09-18 DIAGNOSIS — Q87.89: ICD-10-CM

## 2017-09-18 DIAGNOSIS — I48.0 PAROXYSMAL ATRIAL FIBRILLATION (HCC): ICD-10-CM

## 2017-09-18 LAB
ANION GAP SERPL CALCULATED.3IONS-SCNC: 7 MMOL/L (ref 3–11)
BASOPHILS # BLD AUTO: 0.02 10*3/MM3 (ref 0–0.2)
BASOPHILS NFR BLD AUTO: 0.4 % (ref 0–1)
BUN BLD-MCNC: 13 MG/DL (ref 9–23)
BUN/CREAT SERPL: 14.4 (ref 7–25)
CALCIUM SPEC-SCNC: 9.3 MG/DL (ref 8.7–10.4)
CHLORIDE SERPL-SCNC: 103 MMOL/L (ref 99–109)
CO2 SERPL-SCNC: 29 MMOL/L (ref 20–31)
CREAT BLD-MCNC: 0.9 MG/DL (ref 0.6–1.3)
DEPRECATED RDW RBC AUTO: 46.6 FL (ref 37–54)
DEPRECATED RDW RBC AUTO: 46.7 FL (ref 37–54)
EOSINOPHIL # BLD AUTO: 0.08 10*3/MM3 (ref 0–0.3)
EOSINOPHIL NFR BLD AUTO: 1.5 % (ref 0–3)
ERYTHROCYTE [DISTWIDTH] IN BLOOD BY AUTOMATED COUNT: 13.5 % (ref 11.3–14.5)
ERYTHROCYTE [DISTWIDTH] IN BLOOD BY AUTOMATED COUNT: 13.6 % (ref 11.3–14.5)
GFR SERPL CREATININE-BSD FRML MDRD: 85 ML/MIN/1.73
GLUCOSE BLD-MCNC: 132 MG/DL (ref 70–100)
HCT VFR BLD AUTO: 46 % (ref 38.9–50.9)
HCT VFR BLD AUTO: 46.6 % (ref 38.9–50.9)
HGB BLD-MCNC: 15.5 G/DL (ref 13.1–17.5)
HGB BLD-MCNC: 15.6 G/DL (ref 13.1–17.5)
IMM GRANULOCYTES # BLD: 0 10*3/MM3 (ref 0–0.03)
IMM GRANULOCYTES NFR BLD: 0 % (ref 0–0.6)
INR PPP: 0.96
LYMPHOCYTES # BLD AUTO: 0.95 10*3/MM3 (ref 0.6–4.8)
LYMPHOCYTES NFR BLD AUTO: 18 % (ref 24–44)
MCH RBC QN AUTO: 31.3 PG (ref 27–31)
MCH RBC QN AUTO: 31.9 PG (ref 27–31)
MCHC RBC AUTO-ENTMCNC: 33.3 G/DL (ref 32–36)
MCHC RBC AUTO-ENTMCNC: 33.9 G/DL (ref 32–36)
MCV RBC AUTO: 94.1 FL (ref 80–99)
MCV RBC AUTO: 94.1 FL (ref 80–99)
MONOCYTES # BLD AUTO: 0.55 10*3/MM3 (ref 0–1)
MONOCYTES NFR BLD AUTO: 10.4 % (ref 0–12)
NEUTROPHILS # BLD AUTO: 3.68 10*3/MM3 (ref 1.5–8.3)
NEUTROPHILS NFR BLD AUTO: 69.7 % (ref 41–71)
PLATELET # BLD AUTO: 183 10*3/MM3 (ref 150–450)
PLATELET # BLD AUTO: 187 10*3/MM3 (ref 150–450)
PMV BLD AUTO: 10.8 FL (ref 6–12)
PMV BLD AUTO: 11.6 FL (ref 6–12)
POTASSIUM BLD-SCNC: 4.3 MMOL/L (ref 3.5–5.5)
PROTHROMBIN TIME: 10.5 SECONDS (ref 9.6–11.5)
RBC # BLD AUTO: 4.89 10*6/MM3 (ref 4.2–5.76)
RBC # BLD AUTO: 4.95 10*6/MM3 (ref 4.2–5.76)
SODIUM BLD-SCNC: 139 MMOL/L (ref 132–146)
WBC NRBC COR # BLD: 5.04 10*3/MM3 (ref 3.5–10.8)
WBC NRBC COR # BLD: 5.28 10*3/MM3 (ref 3.5–10.8)

## 2017-09-18 PROCEDURE — 36415 COLL VENOUS BLD VENIPUNCTURE: CPT

## 2017-09-18 PROCEDURE — 85025 COMPLETE CBC W/AUTO DIFF WBC: CPT | Performed by: PHYSICIAN ASSISTANT

## 2017-09-18 PROCEDURE — 85610 PROTHROMBIN TIME: CPT | Performed by: PHYSICIAN ASSISTANT

## 2017-09-18 PROCEDURE — 80048 BASIC METABOLIC PNL TOTAL CA: CPT | Performed by: PHYSICIAN ASSISTANT

## 2017-09-18 PROCEDURE — 0 IOPAMIDOL PER 1 ML: Performed by: INTERNAL MEDICINE

## 2017-09-18 PROCEDURE — 85027 COMPLETE CBC AUTOMATED: CPT

## 2017-09-18 PROCEDURE — 71275 CT ANGIOGRAPHY CHEST: CPT

## 2017-09-18 RX ORDER — DIPHENHYDRAMINE HCL 25 MG
12.5 CAPSULE ORAL EVERY 6 HOURS PRN
COMMUNITY

## 2017-09-18 RX ORDER — LANOLIN ALCOHOL/MO/W.PET/CERES
1000 CREAM (GRAM) TOPICAL EVERY MORNING
COMMUNITY

## 2017-09-18 RX ADMIN — IOPAMIDOL 80 ML: 755 INJECTION, SOLUTION INTRAVENOUS at 11:48

## 2017-09-18 NOTE — DISCHARGE INSTRUCTIONS
The following instructions given during Pre Admission Testing visit:    Do not eat or drink anything after MN except for sips of water with your a.m. Prescription meds unless otherwise instructed by your physician.    Glasses and jewelry may be worn, but dentures must be removed prior to cath/procedure.    Leave any items you consider valuable at home.    Family members may wait in [facility] waiting area during procedure.    Bring all medications in their original containers the day of procedure.    Bring photo ID and insurance cards on the day of procedure.    Need to make arrangements for transportation prior to discharge.    The following handouts were given:     Heart Cath pathway (if applicable)   Cardiac Cath booklet published by [facility]    OR appropriate [facility] procedure booklet    If applicable, pt instructed to bring CPAP mask and tubing the day of procedure.

## 2017-09-19 ENCOUNTER — HOSPITAL ENCOUNTER (OUTPATIENT)
Facility: HOSPITAL | Age: 62
Discharge: HOME | End: 2017-09-20
Attending: INTERNAL MEDICINE | Admitting: INTERNAL MEDICINE

## 2017-09-19 ENCOUNTER — ANESTHESIA (OUTPATIENT)
Dept: CARDIOLOGY | Facility: HOSPITAL | Age: 62
End: 2017-09-19

## 2017-09-19 DIAGNOSIS — I48.0 PAF (PAROXYSMAL ATRIAL FIBRILLATION) (HCC): ICD-10-CM

## 2017-09-19 LAB
ACT BLD: 164 SECONDS (ref 82–152)
ACT BLD: 186 SECONDS (ref 82–152)
ACT BLD: 290 SECONDS (ref 82–152)
ACT BLD: 356 SECONDS (ref 82–152)
ACT BLD: 356 SECONDS (ref 82–152)
ACT BLD: 367 SECONDS (ref 82–152)

## 2017-09-19 PROCEDURE — 25010000002 KETOROLAC TROMETHAMINE PER 15 MG: Performed by: INTERNAL MEDICINE

## 2017-09-19 PROCEDURE — 93662 INTRACARDIAC ECG (ICE): CPT | Performed by: INTERNAL MEDICINE

## 2017-09-19 PROCEDURE — 93656 COMPRE EP EVAL ABLTJ ATR FIB: CPT | Performed by: INTERNAL MEDICINE

## 2017-09-19 PROCEDURE — G0378 HOSPITAL OBSERVATION PER HR: HCPCS

## 2017-09-19 PROCEDURE — 25010000002 DEXAMETHASONE PER 1 MG: Performed by: NURSE ANESTHETIST, CERTIFIED REGISTERED

## 2017-09-19 PROCEDURE — 25010000002 ONDANSETRON PER 1 MG: Performed by: NURSE ANESTHETIST, CERTIFIED REGISTERED

## 2017-09-19 PROCEDURE — 25010000002 PROPOFOL 10 MG/ML EMULSION: Performed by: NURSE ANESTHETIST, CERTIFIED REGISTERED

## 2017-09-19 PROCEDURE — 25010000002 FUROSEMIDE PER 20 MG: Performed by: INTERNAL MEDICINE

## 2017-09-19 PROCEDURE — 93613 INTRACARDIAC EPHYS 3D MAPG: CPT | Performed by: INTERNAL MEDICINE

## 2017-09-19 PROCEDURE — C1893 INTRO/SHEATH, FIXED,NON-PEEL: HCPCS | Performed by: INTERNAL MEDICINE

## 2017-09-19 PROCEDURE — C1894 INTRO/SHEATH, NON-LASER: HCPCS | Performed by: INTERNAL MEDICINE

## 2017-09-19 PROCEDURE — 93623 PRGRMD STIMJ&PACG IV RX NFS: CPT | Performed by: INTERNAL MEDICINE

## 2017-09-19 PROCEDURE — 25010000002 NEOSTIGMINE 10 MG/10ML SOLUTION: Performed by: NURSE ANESTHETIST, CERTIFIED REGISTERED

## 2017-09-19 PROCEDURE — 25010000002 PHENYLEPHRINE PER 1 ML: Performed by: NURSE ANESTHETIST, CERTIFIED REGISTERED

## 2017-09-19 PROCEDURE — 85347 COAGULATION TIME ACTIVATED: CPT

## 2017-09-19 PROCEDURE — 36415 COLL VENOUS BLD VENIPUNCTURE: CPT

## 2017-09-19 PROCEDURE — 25010000002 FENTANYL CITRATE (PF) 100 MCG/2ML SOLUTION: Performed by: NURSE ANESTHETIST, CERTIFIED REGISTERED

## 2017-09-19 PROCEDURE — C1732 CATH, EP, DIAG/ABL, 3D/VECT: HCPCS | Performed by: INTERNAL MEDICINE

## 2017-09-19 PROCEDURE — C1759 CATH, INTRA ECHOCARDIOGRAPHY: HCPCS | Performed by: INTERNAL MEDICINE

## 2017-09-19 PROCEDURE — 25010000002 PROTAMINE SULFATE PER 10 MG: Performed by: INTERNAL MEDICINE

## 2017-09-19 PROCEDURE — 25010000002 HEPARIN (PORCINE) PER 1000 UNITS: Performed by: INTERNAL MEDICINE

## 2017-09-19 PROCEDURE — C1730 CATH, EP, 19 OR FEW ELECT: HCPCS | Performed by: INTERNAL MEDICINE

## 2017-09-19 PROCEDURE — C1769 GUIDE WIRE: HCPCS | Performed by: INTERNAL MEDICINE

## 2017-09-19 RX ORDER — PROPOFOL 10 MG/ML
VIAL (ML) INTRAVENOUS AS NEEDED
Status: DISCONTINUED | OUTPATIENT
Start: 2017-09-19 | End: 2017-09-19 | Stop reason: SURG

## 2017-09-19 RX ORDER — FUROSEMIDE 10 MG/ML
INJECTION INTRAMUSCULAR; INTRAVENOUS AS NEEDED
Status: DISCONTINUED | OUTPATIENT
Start: 2017-09-19 | End: 2017-09-19 | Stop reason: HOSPADM

## 2017-09-19 RX ORDER — SODIUM CHLORIDE, SODIUM LACTATE, POTASSIUM CHLORIDE, CALCIUM CHLORIDE 600; 310; 30; 20 MG/100ML; MG/100ML; MG/100ML; MG/100ML
9 INJECTION, SOLUTION INTRAVENOUS CONTINUOUS
Status: CANCELLED | OUTPATIENT
Start: 2017-09-19

## 2017-09-19 RX ORDER — SODIUM CHLORIDE, SODIUM LACTATE, POTASSIUM CHLORIDE, CALCIUM CHLORIDE 600; 310; 30; 20 MG/100ML; MG/100ML; MG/100ML; MG/100ML
9 INJECTION, SOLUTION INTRAVENOUS CONTINUOUS
Status: DISCONTINUED | OUTPATIENT
Start: 2017-09-19 | End: 2017-09-20 | Stop reason: HOSPADM

## 2017-09-19 RX ORDER — LIDOCAINE HYDROCHLORIDE 20 MG/ML
INJECTION, SOLUTION INFILTRATION; PERINEURAL AS NEEDED
Status: DISCONTINUED | OUTPATIENT
Start: 2017-09-19 | End: 2017-09-19 | Stop reason: SURG

## 2017-09-19 RX ORDER — DEXAMETHASONE SODIUM PHOSPHATE 10 MG/ML
INJECTION INTRAMUSCULAR; INTRAVENOUS AS NEEDED
Status: DISCONTINUED | OUTPATIENT
Start: 2017-09-19 | End: 2017-09-19 | Stop reason: SURG

## 2017-09-19 RX ORDER — PROTAMINE SULFATE 10 MG/ML
INJECTION, SOLUTION INTRAVENOUS AS NEEDED
Status: DISCONTINUED | OUTPATIENT
Start: 2017-09-19 | End: 2017-09-19 | Stop reason: HOSPADM

## 2017-09-19 RX ORDER — SODIUM CHLORIDE 0.9 % (FLUSH) 0.9 %
1-10 SYRINGE (ML) INJECTION AS NEEDED
Status: CANCELLED | OUTPATIENT
Start: 2017-09-19

## 2017-09-19 RX ORDER — PANTOPRAZOLE SODIUM 40 MG/1
40 TABLET, DELAYED RELEASE ORAL EVERY MORNING
Status: DISCONTINUED | OUTPATIENT
Start: 2017-09-19 | End: 2017-09-20 | Stop reason: HOSPADM

## 2017-09-19 RX ORDER — GLYCOPYRROLATE 0.2 MG/ML
INJECTION INTRAMUSCULAR; INTRAVENOUS AS NEEDED
Status: DISCONTINUED | OUTPATIENT
Start: 2017-09-19 | End: 2017-09-19 | Stop reason: SURG

## 2017-09-19 RX ORDER — SODIUM CHLORIDE 0.9 % (FLUSH) 0.9 %
1-10 SYRINGE (ML) INJECTION AS NEEDED
Status: DISCONTINUED | OUTPATIENT
Start: 2017-09-19 | End: 2017-09-19

## 2017-09-19 RX ORDER — SCOLOPAMINE TRANSDERMAL SYSTEM 1 MG/1
1 PATCH, EXTENDED RELEASE TRANSDERMAL
Status: DISCONTINUED | OUTPATIENT
Start: 2017-09-19 | End: 2017-09-20 | Stop reason: HOSPADM

## 2017-09-19 RX ORDER — NEOSTIGMINE METHYLSULFATE 1 MG/ML
INJECTION, SOLUTION INTRAVENOUS AS NEEDED
Status: DISCONTINUED | OUTPATIENT
Start: 2017-09-19 | End: 2017-09-19 | Stop reason: SURG

## 2017-09-19 RX ORDER — FAMOTIDINE 10 MG/ML
20 INJECTION, SOLUTION INTRAVENOUS ONCE
Status: DISCONTINUED | OUTPATIENT
Start: 2017-09-19 | End: 2017-09-19

## 2017-09-19 RX ORDER — ONDANSETRON 2 MG/ML
INJECTION INTRAMUSCULAR; INTRAVENOUS AS NEEDED
Status: DISCONTINUED | OUTPATIENT
Start: 2017-09-19 | End: 2017-09-19 | Stop reason: SURG

## 2017-09-19 RX ORDER — FENTANYL CITRATE 50 UG/ML
INJECTION, SOLUTION INTRAMUSCULAR; INTRAVENOUS AS NEEDED
Status: DISCONTINUED | OUTPATIENT
Start: 2017-09-19 | End: 2017-09-19 | Stop reason: SURG

## 2017-09-19 RX ORDER — OXYCODONE HYDROCHLORIDE AND ACETAMINOPHEN 5; 325 MG/1; MG/1
1 TABLET ORAL EVERY 4 HOURS PRN
Status: DISCONTINUED | OUTPATIENT
Start: 2017-09-19 | End: 2017-09-20 | Stop reason: HOSPADM

## 2017-09-19 RX ORDER — ROCURONIUM BROMIDE 10 MG/ML
INJECTION, SOLUTION INTRAVENOUS AS NEEDED
Status: DISCONTINUED | OUTPATIENT
Start: 2017-09-19 | End: 2017-09-19 | Stop reason: SURG

## 2017-09-19 RX ORDER — ONDANSETRON 2 MG/ML
4 INJECTION INTRAMUSCULAR; INTRAVENOUS EVERY 6 HOURS PRN
Status: DISCONTINUED | OUTPATIENT
Start: 2017-09-19 | End: 2017-09-20 | Stop reason: HOSPADM

## 2017-09-19 RX ORDER — SUCRALFATE 1 G/1
1 TABLET ORAL
Status: DISCONTINUED | OUTPATIENT
Start: 2017-09-19 | End: 2017-09-20 | Stop reason: HOSPADM

## 2017-09-19 RX ORDER — FLECAINIDE ACETATE 50 MG/1
50 TABLET ORAL EVERY 12 HOURS SCHEDULED
Status: DISCONTINUED | OUTPATIENT
Start: 2017-09-19 | End: 2017-09-20 | Stop reason: HOSPADM

## 2017-09-19 RX ORDER — SODIUM CHLORIDE 9 MG/ML
INJECTION, SOLUTION INTRAVENOUS CONTINUOUS PRN
Status: DISCONTINUED | OUTPATIENT
Start: 2017-09-19 | End: 2017-09-19 | Stop reason: SURG

## 2017-09-19 RX ORDER — HEPARIN SODIUM 1000 [USP'U]/ML
INJECTION, SOLUTION INTRAVENOUS; SUBCUTANEOUS AS NEEDED
Status: DISCONTINUED | OUTPATIENT
Start: 2017-09-19 | End: 2017-09-19 | Stop reason: HOSPADM

## 2017-09-19 RX ORDER — LIDOCAINE HYDROCHLORIDE 10 MG/ML
0.5 INJECTION, SOLUTION EPIDURAL; INFILTRATION; INTRACAUDAL; PERINEURAL ONCE AS NEEDED
Status: CANCELLED | OUTPATIENT
Start: 2017-09-19

## 2017-09-19 RX ORDER — HYDROMORPHONE HYDROCHLORIDE 1 MG/ML
0.5 INJECTION, SOLUTION INTRAMUSCULAR; INTRAVENOUS; SUBCUTANEOUS
Status: DISCONTINUED | OUTPATIENT
Start: 2017-09-19 | End: 2017-09-19 | Stop reason: HOSPADM

## 2017-09-19 RX ORDER — FENTANYL CITRATE 50 UG/ML
50 INJECTION, SOLUTION INTRAMUSCULAR; INTRAVENOUS
Status: DISCONTINUED | OUTPATIENT
Start: 2017-09-19 | End: 2017-09-19 | Stop reason: HOSPADM

## 2017-09-19 RX ORDER — ONDANSETRON 2 MG/ML
4 INJECTION INTRAMUSCULAR; INTRAVENOUS ONCE AS NEEDED
Status: DISCONTINUED | OUTPATIENT
Start: 2017-09-19 | End: 2017-09-19 | Stop reason: HOSPADM

## 2017-09-19 RX ORDER — ATRACURIUM BESYLATE 10 MG/ML
INJECTION, SOLUTION INTRAVENOUS AS NEEDED
Status: DISCONTINUED | OUTPATIENT
Start: 2017-09-19 | End: 2017-09-19 | Stop reason: SURG

## 2017-09-19 RX ORDER — LIDOCAINE HYDROCHLORIDE 10 MG/ML
0.5 INJECTION, SOLUTION EPIDURAL; INFILTRATION; INTRACAUDAL; PERINEURAL ONCE AS NEEDED
Status: DISCONTINUED | OUTPATIENT
Start: 2017-09-19 | End: 2017-09-19

## 2017-09-19 RX ORDER — PANTOPRAZOLE SODIUM 40 MG/1
40 TABLET, DELAYED RELEASE ORAL
Status: DISCONTINUED | OUTPATIENT
Start: 2017-09-19 | End: 2017-09-19

## 2017-09-19 RX ORDER — KETOROLAC TROMETHAMINE 15 MG/ML
15 INJECTION, SOLUTION INTRAMUSCULAR; INTRAVENOUS EVERY 12 HOURS
Status: COMPLETED | OUTPATIENT
Start: 2017-09-19 | End: 2017-09-20

## 2017-09-19 RX ORDER — FAMOTIDINE 20 MG/1
20 TABLET, FILM COATED ORAL ONCE
Status: COMPLETED | OUTPATIENT
Start: 2017-09-19 | End: 2017-09-19

## 2017-09-19 RX ORDER — SODIUM CHLORIDE 9 MG/ML
INJECTION, SOLUTION INTRAVENOUS CONTINUOUS PRN
Status: DISCONTINUED | OUTPATIENT
Start: 2017-09-19 | End: 2017-09-19 | Stop reason: HOSPADM

## 2017-09-19 RX ADMIN — SUCRALFATE 1 G: 1 TABLET ORAL at 21:15

## 2017-09-19 RX ADMIN — Medication 2 PATCH: at 21:21

## 2017-09-19 RX ADMIN — ROCURONIUM BROMIDE 10 MG: 10 INJECTION, SOLUTION INTRAVENOUS at 13:25

## 2017-09-19 RX ADMIN — LIDOCAINE HYDROCHLORIDE 40 MG: 20 INJECTION, SOLUTION INFILTRATION; PERINEURAL at 12:58

## 2017-09-19 RX ADMIN — DEXAMETHASONE SODIUM PHOSPHATE 8 MG: 10 INJECTION INTRAMUSCULAR; INTRAVENOUS at 13:15

## 2017-09-19 RX ADMIN — FLECAINIDE ACETATE 50 MG: 50 TABLET ORAL at 21:15

## 2017-09-19 RX ADMIN — ROCURONIUM BROMIDE 40 MG: 10 INJECTION, SOLUTION INTRAVENOUS at 12:58

## 2017-09-19 RX ADMIN — FENTANYL CITRATE 50 MCG: 50 INJECTION, SOLUTION INTRAMUSCULAR; INTRAVENOUS at 16:10

## 2017-09-19 RX ADMIN — OXYCODONE AND ACETAMINOPHEN 1 TABLET: 5; 325 TABLET ORAL at 21:15

## 2017-09-19 RX ADMIN — PHENYLEPHRINE HYDROCHLORIDE 80 MCG: 10 INJECTION INTRAVENOUS at 15:03

## 2017-09-19 RX ADMIN — FENTANYL CITRATE 50 MCG: 50 INJECTION, SOLUTION INTRAMUSCULAR; INTRAVENOUS at 14:17

## 2017-09-19 RX ADMIN — APIXABAN 5 MG: 5 TABLET, FILM COATED ORAL at 09:45

## 2017-09-19 RX ADMIN — SODIUM CHLORIDE: 9 INJECTION, SOLUTION INTRAVENOUS at 12:53

## 2017-09-19 RX ADMIN — APIXABAN 5 MG: 5 TABLET, FILM COATED ORAL at 21:15

## 2017-09-19 RX ADMIN — NEOSTIGMINE METHYLSULFATE 3 MG: 1 INJECTION, SOLUTION INTRAVENOUS at 15:35

## 2017-09-19 RX ADMIN — ONDANSETRON 4 MG: 2 INJECTION INTRAMUSCULAR; INTRAVENOUS at 15:35

## 2017-09-19 RX ADMIN — FAMOTIDINE 20 MG: 20 TABLET ORAL at 09:10

## 2017-09-19 RX ADMIN — SCOPALAMINE 1 PATCH: 1 PATCH, EXTENDED RELEASE TRANSDERMAL at 09:10

## 2017-09-19 RX ADMIN — GLYCOPYRROLATE 0.4 MG: 0.2 INJECTION, SOLUTION INTRAMUSCULAR; INTRAVENOUS at 15:35

## 2017-09-19 RX ADMIN — FENTANYL CITRATE 25 MCG: 50 INJECTION, SOLUTION INTRAMUSCULAR; INTRAVENOUS at 12:57

## 2017-09-19 RX ADMIN — SODIUM CHLORIDE: 9 INJECTION, SOLUTION INTRAVENOUS at 12:54

## 2017-09-19 RX ADMIN — ATRACURIUM BESYLATE 30 MG: 10 INJECTION, SOLUTION INTRAVENOUS at 14:15

## 2017-09-19 RX ADMIN — KETOROLAC TROMETHAMINE 15 MG: 15 INJECTION, SOLUTION INTRAMUSCULAR; INTRAVENOUS at 21:16

## 2017-09-19 RX ADMIN — PHENYLEPHRINE HYDROCHLORIDE 80 MCG: 10 INJECTION INTRAVENOUS at 15:06

## 2017-09-19 RX ADMIN — FENTANYL CITRATE 50 MCG: 50 INJECTION, SOLUTION INTRAMUSCULAR; INTRAVENOUS at 17:22

## 2017-09-19 RX ADMIN — FENTANYL CITRATE 50 MCG: 50 INJECTION, SOLUTION INTRAMUSCULAR; INTRAVENOUS at 16:15

## 2017-09-19 RX ADMIN — METOPROLOL TARTRATE 12.5 MG: 25 TABLET, FILM COATED ORAL at 23:18

## 2017-09-19 RX ADMIN — FENTANYL CITRATE 25 MCG: 50 INJECTION, SOLUTION INTRAMUSCULAR; INTRAVENOUS at 13:34

## 2017-09-19 RX ADMIN — PROPOFOL 150 MG: 10 INJECTION, EMULSION INTRAVENOUS at 12:58

## 2017-09-19 RX ADMIN — PHENYLEPHRINE HYDROCHLORIDE 80 MCG: 10 INJECTION INTRAVENOUS at 14:40

## 2017-09-19 NOTE — ANESTHESIA PROCEDURE NOTES
Airway  Urgency: elective    Difficult airway    General Information and Staff    Patient location during procedure: OR    Indications and Patient Condition  Indications for airway management: airway protection    Preoxygenated: yes  Mask difficulty assessment: 1 - vent by mask    Final Airway Details  Final airway type: endotracheal airway      Successful airway: ETT  Cuffed: yes   Successful intubation technique: direct laryngoscopy and video laryngoscopy  Facilitating devices/methods: intubating stylet  Endotracheal tube insertion site: oral  Blade: Melanie  Blade size: #3  ETT size: 7.5 mm  Placement verified by: chest auscultation and capnometry   Measured from: lips  ETT to lips (cm): 22  Number of attempts at approach: 3 or more

## 2017-09-19 NOTE — PLAN OF CARE
Problem: Patient Care Overview (Adult)  Goal: Plan of Care Review  Outcome: Ongoing (interventions implemented as appropriate)  ORIENTED TO ROOM UPON ARRIVAL TO [facility]    09/19/17 0825   Coping/Psychosocial Response Interventions   Plan Of Care Reviewed With patient   Patient Care Overview   Progress no change         Problem: Arrhythmia/Dysrhythmia (Symptomatic) (Adult)  Goal: Signs and Symptoms of Listed Potential Problems Will be Absent or Manageable (Arrhythmia/Dysrhythmia)  Outcome: Ongoing (interventions implemented as appropriate)  HERE FOR PVA; PROCEDURE TEACHING DONE AT BS PER ULICES HALE    09/19/17 0900   Arrhythmia/Dysrhythmia (Symptomatic)   Problems Assessed (Arrhythmia/Dysrhythmia) other (see comments)   Problems Present (Arrhythmia/Dysrhythmia) other (see comments)

## 2017-09-19 NOTE — ANESTHESIA POSTPROCEDURE EVALUATION
Patient: Mike Knapp Jr.    Procedure Summary     Date Anesthesia Start Anesthesia Stop Room / Location    09/19/17 1245  [facility] CATH/EP LAB [facility] / [facility] EP INVASIVE LOCATION       Procedure Diagnosis Provider Provider    Ablation atrial fibrillation (N/A ) PAF (paroxysmal atrial fibrillation)  (PAF) DO Matt Giraldo MD          Anesthesia Type: general  Last vitals  /94       Temp     97.1   Pulse 90      Resp     18   SpO2 97%         Post Anesthesia Care and Evaluation    Patient location during evaluation: PACU  Patient participation: complete - patient participated  Level of consciousness: sleepy but conscious  Pain score: 0  Pain management: adequate  Airway patency: patent  Anesthetic complications: No anesthetic complications  PONV Status: none  Cardiovascular status: hemodynamically stable and acceptable  Respiratory status: nonlabored ventilation, acceptable and nasal cannula  Hydration status: acceptable

## 2017-09-19 NOTE — ANESTHESIA PREPROCEDURE EVALUATION
Anesthesia Evaluation     Patient summary reviewed and Nursing notes reviewed          Airway   Mallampati: II  Dental      Pulmonary - negative pulmonary ROS   Cardiovascular - negative cardio ROS        Neuro/Psych- negative ROS  GI/Hepatic/Renal/Endo - negative ROS     Musculoskeletal (-) negative ROS    Abdominal    Substance History - negative use     OB/GYN negative ob/gyn ROS         Other                                        Anesthesia Plan    ASA 3     general

## 2017-09-19 NOTE — H&P
"Electrophysiology History & Physical    Mike Knapp Jr.  2528/1  [id number]  [unfilled]  1955    DATE OF ADMISSION: 9/19/2017    Manuel Cooper MD    Chief complaint: afib     History of Present Illness    Patient is a 62 yo male with a hx of paroxysmal atrial fibrillation s/p PVA 5/24/16 that presents today for a redo PVA. He has had continued episodes of afib despite Tikosyn therapy. He is symptomatic with palpitations, dizziness, fatigue, and shortness of breath. He has not missed any doses of his Eliquis. He has episodes about once every 1-2 weeks and they last from minutes up to 8 hours. He has not had any recent hospital or ER visits. No issues with CP, SOA, PND, edema, orthopnea, fevers, chills.     Past Medical History:   Diagnosis Date   • AF (atrial fibrillation)    • Atrial flutter 8/25/2016    atrial fibrillation: Documentation by EKG, 08/25/2014. Patient is status post EP study and typical flutter ablation on 10/17/2014.   On 04/19/2014, patient noted palpitations at home and went to the emergency department.  On telemetry monitor at the outside hospital, patient was noted to be in type 1 atrial fibrillation with rates of about 140 beats per minute.  There was no atrial flutter noted o   • Barretts esophagus    • Chest pain    • Colitis 2014   • Colitis 2014   • Contact dermatitis due to ragweed     \"RAW THROAT\"   • CLAUDIA (generalized anxiety disorder)    • GERD (gastroesophageal reflux disease)     Clemons's esophagus H/O   • H/O atrial flutter    • Hyperlipidemia    • Lung cyst     benign   • Lung cyst     Benign    • Osteoarthritis    • Osteoarthritis    • PONV (postoperative nausea and vomiting)    • Sleeplessness    • Thrombocytopenia    • Tinnitus     Left ear Secondary to a viral illness    • Tinnitus of left ear     secondary to viral illness         Past Surgical History:   Procedure Laterality Date   • APPENDECTOMY     • CARDIAC ABLATION     • CHOLECYSTECTOMY     • COLECTOMY " PARTIAL / TOTAL     • COLONOSCOPY     • HERNIA REPAIR     • INGUINAL HERNIA REPAIR Left    • KNEE SURGERY  1960   • LASIK     • LASIK     • WISDOM TOOTH EXTRACTION         Prescriptions Prior to Admission   Medication Sig Dispense Refill Last Dose   • cholecalciferol (VITAMIN D3) 1000 UNITS tablet Take 1,000 Units by mouth Every Morning.   9/18/2017 at Unknown time   • ELIQUIS 5 MG tablet tablet TAKE 1 TABLET TWO TIMES A DAY 60 tablet 11 9/19/2017 at 0400   • Flaxseed, Linseed, (FLAXSEED OIL PO) Take 1,000 mg by mouth Every Morning.   9/18/2017 at Unknown time   • lansoprazole (PREVACID) 30 MG capsule Take 30 mg by mouth Every Morning Before Breakfast.   9/18/2017 at Unknown time   • Melatonin 3 MG capsule Take 1.5 mg by mouth Every Night.   9/18/2017 at Unknown time   • metoprolol tartrate (LOPRESSOR) 25 MG tablet Take 12.5 mg by mouth 3 (Three) Times a Day.   9/19/2017 at 0400   • vitamin B-12 (CYANOCOBALAMIN) 1000 MCG tablet Take 1,000 mcg by mouth Every Morning.   9/18/2017 at Unknown time   • diphenhydrAMINE (BENADRYL) 25 mg capsule Take 12.5 mg by mouth Every 6 (Six) Hours As Needed for Itching.   More than a month at Unknown time   • dofetilide (TIKOSYN) 500 MCG capsule TAKE 1 CAPSULE TWO TIMES A DAY 60 capsule 5 9/13/2017       Current Meds  No current facility-administered medications on file prior to encounter.      Current Outpatient Prescriptions on File Prior to Encounter   Medication Sig Dispense Refill   • cholecalciferol (VITAMIN D3) 1000 UNITS tablet Take 1,000 Units by mouth Every Morning.     • ELIQUIS 5 MG tablet tablet TAKE 1 TABLET TWO TIMES A DAY 60 tablet 11   • Flaxseed, Linseed, (FLAXSEED OIL PO) Take 1,000 mg by mouth Every Morning.     • lansoprazole (PREVACID) 30 MG capsule Take 30 mg by mouth Every Morning Before Breakfast.     • metoprolol tartrate (LOPRESSOR) 25 MG tablet Take 12.5 mg by mouth 3 (Three) Times a Day.     • dofetilide (TIKOSYN) 500 MCG capsule TAKE 1 CAPSULE TWO TIMES A  "DAY 60 capsule 5   • [DISCONTINUED] melatonin 1 MG tablet Take 1.5 mg by mouth Every Night.           Social History     Social History   • Marital status:      Spouse name: N/A   • Number of children: N/A   • Years of education: N/A     Occupational History   • Not on file.     Social History Main Topics   • Smoking status: Never Smoker   • Smokeless tobacco: Never Used   • Alcohol use No   • Drug use: No   • Sexual activity: Defer     Other Topics Concern   • Not on file     Social History Narrative       Family History   Problem Relation Age of Onset   • Arrhythmia Mother    • Hypertension Mother    • Diabetes Mother    • Heart disease Father            REVIEW OF SYSTEMS:   12 point ROS was performed and is negative except as outlined in HPI           Objective:     Vitals:    09/19/17 0825 09/19/17 0826   BP: 129/86 133/84   BP Location: Right arm Left arm   Patient Position: Lying Lying   Pulse: 80    Resp: 18    Temp: 98.7 °F (37.1 °C)    TempSrc: Tympanic    SpO2: 97%    Weight: 206 lb 5.6 oz (93.6 kg)    Height: 70\" (177.8 cm)      Body mass index is 29.61 kg/(m^2).  Flowsheet Rows         First Filed Value    Admission Height  70\" (177.8 cm) Documented at 09/19/2017 0825    Admission Weight  206 lb 5.6 oz (93.6 kg) Documented at 09/19/2017 0825          General Appearance:    Alert, cooperative, in no acute distress   Head:    Normocephalic, without obvious abnormality, atraumatic   Eyes:            Lids and lashes normal, conjunctivae and sclerae normal, no   icterus, no pallor, corneas clear, PERRLA   Ears:    Ears appear intact with no abnormalities noted       Neck:  supple,no thyromegaly, no carotid bruit, no JVD       Lungs:     Clear to auscultation,respirations regular, even and unlabored    Heart:    Regular rhythm and normal rate, normal S1 and S2, no murmur, no gallop, no rub, no click   Chest Wall:    No abnormalities observed   Abdomen:     Normal bowel sounds, no masses, no " organomegaly, soft        non-tender, non-distended, no guarding, no rebound  tenderness   Extremities:   Moves all extremities well, no edema, no cyanosis, no redness   Pulses:   Pulses palpable and equal bilaterally. Normal radial, carotid, femoral, dorsalis pedis and posterior tibial pulses bilaterally. Normal abdominal aorta   Skin:   No bleeding, bruising or rash       sychiatric:   Alert and oented x 3, normal mood and affect.   Lab Review:      Results Review:     I reviewed the patient's new clinical results.      Results from last 7 days  Lab Units 09/18/17  1032   WBC 10*3/mm3 5.04   HEMOGLOBIN g/dL 15.5   HEMATOCRIT % 46.6   PLATELETS 10*3/mm3 183       Results from last 7 days  Lab Units 09/18/17  1002   SODIUM mmol/L 139   POTASSIUM mmol/L 4.3   CHLORIDE mmol/L 103   CO2 mmol/L 29.0   BUN mg/dL 13   CREATININE mg/dL 0.90   CALCIUM mg/dL 9.3   GLUCOSE mg/dL 132*       Results from last 7 days  Lab Units 09/18/17  1002   SODIUM mmol/L 139   POTASSIUM mmol/L 4.3   CHLORIDE mmol/L 103   CO2 mmol/L 29.0   BUN mg/dL 13   CREATININE mg/dL 0.90   GLUCOSE mg/dL 132*   CALCIUM mg/dL 9.3       Results from last 7 days  Lab Units 09/18/17  1002   INR  0.96         I personally viewed and interpreted the patient's EKG/Telemetry data    Assessment:    Principal Problem:    PAF (paroxysmal atrial fibrillation)       Plan:       Patient with symptomatic recurrent PAF despite Tikosyn and previous PVA in the past. He will go for redo PVA. Risks, benefits and alternatives have been discussed and patient wishes to proceed. Continue Eliquis 5mg BID for a/c.       Scribed for Adithya Lynch DO by Christine Eubanks PA-C. 9/19/2017  8:52 AM      ULICES Carey  [facility]  09/19/17  8:52 AM    I, Adithya Lynch, personally performed the services described in this documentation as scribed by the above named individual in my presence, and it is both accurate and complete.  9/19/2017  4:10 PM    Adithya Lynch  DO  4:10 PM  09/19/17

## 2017-09-20 VITALS
SYSTOLIC BLOOD PRESSURE: 127 MMHG | HEIGHT: 70 IN | BODY MASS INDEX: 29.54 KG/M2 | RESPIRATION RATE: 16 BRPM | WEIGHT: 206.35 LBS | HEART RATE: 77 BPM | TEMPERATURE: 98.3 F | OXYGEN SATURATION: 95 % | DIASTOLIC BLOOD PRESSURE: 83 MMHG

## 2017-09-20 PROCEDURE — 99213 OFFICE O/P EST LOW 20 MIN: CPT | Performed by: INTERNAL MEDICINE

## 2017-09-20 PROCEDURE — 93010 ELECTROCARDIOGRAM REPORT: CPT | Performed by: INTERNAL MEDICINE

## 2017-09-20 PROCEDURE — G0378 HOSPITAL OBSERVATION PER HR: HCPCS

## 2017-09-20 PROCEDURE — 25010000002 KETOROLAC TROMETHAMINE PER 15 MG: Performed by: INTERNAL MEDICINE

## 2017-09-20 PROCEDURE — 93005 ELECTROCARDIOGRAM TRACING: CPT | Performed by: INTERNAL MEDICINE

## 2017-09-20 RX ORDER — FLECAINIDE ACETATE 50 MG/1
50 TABLET ORAL EVERY 12 HOURS SCHEDULED
Qty: 60 TABLET | Refills: 3 | Status: SHIPPED | OUTPATIENT
Start: 2017-09-20 | End: 2018-05-14 | Stop reason: ALTCHOICE

## 2017-09-20 RX ORDER — FLECAINIDE ACETATE 50 MG/1
50 TABLET ORAL EVERY 12 HOURS SCHEDULED
Qty: 60 TABLET | Refills: 3 | Status: SHIPPED | OUTPATIENT
Start: 2017-09-20 | End: 2017-09-20

## 2017-09-20 RX ORDER — SUCRALFATE 1 G/1
1 TABLET ORAL
Qty: 28 TABLET | Refills: 0 | Status: SHIPPED | OUTPATIENT
Start: 2017-09-20 | End: 2017-09-20

## 2017-09-20 RX ORDER — SUCRALFATE 1 G/1
1 TABLET ORAL
Qty: 28 TABLET | Refills: 0 | Status: SHIPPED | OUTPATIENT
Start: 2017-09-20 | End: 2017-10-20

## 2017-09-20 RX ADMIN — METOPROLOL TARTRATE 12.5 MG: 25 TABLET, FILM COATED ORAL at 06:55

## 2017-09-20 RX ADMIN — SUCRALFATE 1 G: 1 TABLET ORAL at 12:02

## 2017-09-20 RX ADMIN — PANTOPRAZOLE SODIUM 40 MG: 40 TABLET, DELAYED RELEASE ORAL at 06:55

## 2017-09-20 RX ADMIN — KETOROLAC TROMETHAMINE 15 MG: 15 INJECTION, SOLUTION INTRAMUSCULAR; INTRAVENOUS at 06:55

## 2017-09-20 RX ADMIN — SUCRALFATE 1 G: 1 TABLET ORAL at 06:55

## 2017-09-20 RX ADMIN — APIXABAN 5 MG: 5 TABLET, FILM COATED ORAL at 08:44

## 2017-09-20 RX ADMIN — FLECAINIDE ACETATE 50 MG: 50 TABLET ORAL at 08:44

## 2017-09-20 NOTE — NURSING NOTE
Ablation Nurse Navigator    Pt s/p redo PVA w/RFA of Rt superior & inferior PVs, & highly fractionated signals on the posterior wall & LA roof line yesterday. Doing well this AM. No C/O.  NSR on Tele.  HR 80s, /82.  Has had breakfast without nausea, has voided without difficulty and has ambulated in the doyle with me without dizziness or groin issue.  Going home later today.  Reviewed D/C information with patient and his wife, particularly normal post procedural expectations and what to call for.  Ablation discharge instruction handout left for future reference.  Verbalized understanding.  Will follow up in the AFib Clinic in one month and with Dr. Lynch in 3 months.  I gave them my contact information and encouraged to call me with questions or concerns in the interim.      Administratively Closed by Health Information Management      JONATHAN Teran, RN  09/20/17  10:57 AM

## 2017-09-20 NOTE — PROGRESS NOTES
Discharge Planning Assessment  [facility]     Patient Name: Mike Knapp Jr.  MRN: [id number]  Today's Date: 9/20/2017    Admit Date: 9/19/2017          Discharge Needs Assessment       09/20/17 0980    Living Environment    Lives With spouse    Living Arrangements house    Home Accessibility no concerns;stairs to enter home    Number of Stairs to Enter Home 4    Stair Railings at Home inside, present at both sides    Type of Financial/Environmental Concern none    Transportation Available car;family or friend will provide    Living Environment    Provides Primary Care For no one    Quality Of Family Relationships supportive;helpful;involved    Able to Return to Prior Living Arrangements yes    Discharge Needs Assessment    Concerns To Be Addressed denies needs/concerns at this time    Anticipated Changes Related to Illness none    Equipment Currently Used at Home none    Equipment Needed After Discharge none    Discharge Disposition still a patient    Discharge Contact Information if Applicable [phone]            Discharge Plan       09/20/17 09    Case Management/Social Work Plan    Plan Home with wife    Patient/Family In Agreement With Plan yes    Additional Comments Met with pt at . Pt is independent of ADL's. Denies any DME or HH. He currently has an [employer] Plan. His prescriptions are affordable at this time. He denies any d/c needs. CM will follow.         Discharge Placement     No information found        Expected Discharge Date and Time     Expected Discharge Date Expected Discharge Time    Sep 20, 2017               Demographic Summary       09/20/17 0977    Referral Information    Referral Source admission list    Contact Information    Permission Granted to Share Information With ;family/designee   May share info with wife Carol.     Primary Care Physician Information    Name Dr. Manuel Cooper             Functional Status       09/20/17 2130     Functional Status Prior    Ambulation 0-->independent    Transferring 0-->independent    Toileting 0-->independent    Bathing 0-->independent    Dressing 0-->independent    Eating 0-->independent    Communication 0-->understands/communicates without difficulty    Swallowing 0-->swallows foods/liquids without difficulty    IADL    Medications independent    Meal Preparation independent    Housekeeping independent    Laundry independent    Shopping independent    Oral Care independent    Activity Tolerance    Current Activity Limitations none    Usual Activity Tolerance good    Current Activity Tolerance good            Psychosocial     None            Abuse/Neglect     None            Legal     None            Substance Abuse     None            Patient Forms     None          Christine Will

## 2017-09-20 NOTE — PLAN OF CARE
Problem: Patient Care Overview (Adult)  Goal: Plan of Care Review  Outcome: Ongoing (interventions implemented as appropriate)    09/20/17 2584   Outcome Evaluation   Outcome Summary/Follow up Plan Pt had Ablation yesterday. R and L groin site C/D/I. Mild c/o pain a groin sites relieved with PRN pain medicine. VSS. ST. Will continue to monitor.          Problem: Arrhythmia/Dysrhythmia (Symptomatic) (Adult)  Goal: Signs and Symptoms of Listed Potential Problems Will be Absent or Manageable (Arrhythmia/Dysrhythmia)  Outcome: Ongoing (interventions implemented as appropriate)

## 2017-09-20 NOTE — PROGRESS NOTES
[facility]  Cardiovascular Progress Note  Mike Knapp Jr.  N606/1  [id number]  1955    DATE OF ADMISSION: 9/19/2017  DATE OF FOLLOW UP:  9/20/17     Manuel Cooper MD    Subjective:     Patient ID: Mike Knapp Jr. is a 61 y.o. male.    Chief Complaint: afib f/u     Allergies   Allergen Reactions   • Sulfa Antibiotics Rash       Current Facility-Administered Medications:   •  apixaban (ELIQUIS) tablet 5 mg, 5 mg, Oral, Q12H, ULICES Rothman, 5 mg at 09/19/17 2115  •  flecainide (TAMBOCOR) tablet 50 mg, 50 mg, Oral, Q12H, Adithya Lynch DO, 50 mg at 09/19/17 2115  •  lactated ringers infusion, 9 mL/hr, Intravenous, Continuous, Quan Hoang MD, Stopped at 09/19/17 1859  •  lidocaine (XYLOCAINE) 2 % jelly, , Urethral, Once, Adithya Lynch, DO  •  metoprolol tartrate (LOPRESSOR) tablet 12.5 mg, 12.5 mg, Oral, Q8H, ULICES Rothman, 12.5 mg at 09/20/17 0655  •  ondansetron (ZOFRAN) injection 4 mg, 4 mg, Intravenous, Q6H PRN, Adithya Lynch, DO  •  oxyCODONE-acetaminophen (PERCOCET) 5-325 MG per tablet 1 tablet, 1 tablet, Oral, Q4H PRN, Adithya Lynch DO, 1 tablet at 09/19/17 2115  •  pantoprazole (PROTONIX) EC tablet 40 mg, 40 mg, Oral, QAM, ULICES Rothman, 40 mg at 09/20/17 0655  •  Scopolamine (TRANSDERM-SCOP) 1.5 MG/3DAYS patch 1 patch, 1 patch, Transdermal, Q72H, Adithya Lynch, DO, 1 patch at 09/19/17 0910  •  sucralfate (CARAFATE) tablet 1 g, 1 g, Oral, 4x Daily AC & at Bedtime, Adithya Lynch DO, 1 g at 09/20/17 0655    History of Present Illness    Groins are a little sore this morning. Has not urinated yet. Has ambulated with no issue.     ROS   14 point ROS negative except as outlined in problem list, HPI and other parts of the note.    Procedures       Objective:       Vitals:    09/20/17 0100 09/20/17 0300 09/20/17 0500 09/20/17 0655   BP: 111/75 115/74 103/71 108/73   BP Location:  Right arm     Patient Position:  Lying     Pulse: 93  87 80 83   Resp:  16     Temp:  97.1 °F (36.2 °C)     TempSrc:  Temporal Artery      SpO2: 96% 97% 93%    Weight:       Height:           Intake/Output Summary (Last 24 hours) at 09/20/17 0834  Last data filed at 09/20/17 0330   Gross per 24 hour   Intake             1100 ml   Output             2150 ml   Net            -1050 ml       GENERAL: Well-developed, well-nourished patient in no acute distress.  NECK: No JVD present at 30°. No carotid bruits auscultated.  LUNGS: Clear to auscultation.  CARDIOVASCULAR: Heart has a regular rate and rhythm. No murmurs, gallops or rubs noted.   EXT: pulses intact, no swelling. Groin sites with no hematoma.   SKIN: Pink, warm  Neuro: Nonfocal exam. Gait intact    The patient's old records including ambulatory rhythm recordings (ECGs, Holter/event monitor) were reviewed and discussed.      Lab Review:     Results from last 7 days  Lab Units 09/18/17  1002   SODIUM mmol/L 139   POTASSIUM mmol/L 4.3   CHLORIDE mmol/L 103   CO2 mmol/L 29.0   BUN mg/dL 13   CREATININE mg/dL 0.90   GLUCOSE mg/dL 132*   CALCIUM mg/dL 9.3           Results from last 7 days  Lab Units 09/18/17  1032 09/18/17  1002   WBC 10*3/mm3 5.04 5.28   HEMOGLOBIN g/dL 15.5 15.6   HEMATOCRIT % 46.6 46.0   PLATELETS 10*3/mm3 183 187       Results from last 7 days  Lab Units 09/18/17  1002   INR  0.96     EKG NSR with intervals wnl. QRS in the low 80s    Assessment & Plan:     1. Symptomatic recurrent PAF despite Tikosyn and previous PVA  - s/p redo PVA with reconnection along the right superior and right inferior pulm veins w/ eventual complete isolation of all veins. Ablation also along fractionated signals on posterior wall and a roof line   - patient will be discharged on Flecainide and Lopressor   - continue NOAC     Patient will be dis-charged home in stable condition after he has voided. Follow-up in afib clinic in one month and Dr. Lynch in 8-10 weeks         ULICES Carey  09/20/17  8:34 AM    Adithya CATALAN  Cris, have reviewed the note in full and agree with all aspects of the above including physical exam, assessment, labs and plan with changes made accordingly. Face to Face Time was spent with the patient.    Adithya Lynch DO  09/20/17  9:03 AM

## 2017-09-21 LAB — ACT BLD: 142 SECONDS (ref 82–152)

## 2017-09-22 ENCOUNTER — CLINICAL SUPPORT (OUTPATIENT)
Dept: CARDIOLOGY | Facility: CLINIC | Age: 62
End: 2017-09-22

## 2017-09-22 DIAGNOSIS — I48.0 PAF (PAROXYSMAL ATRIAL FIBRILLATION) (HCC): Primary | ICD-10-CM

## 2017-09-22 LAB — ACT BLD: 136 SECONDS (ref 82–152)

## 2017-09-22 PROCEDURE — 93000 ELECTROCARDIOGRAM COMPLETE: CPT | Performed by: INTERNAL MEDICINE

## 2017-09-22 NOTE — PAYOR COMM NOTE
"Erasmo Knapp JrAileen (61 y.o. Male)   Auth # [id number]  Daysi Junior RN, BSN  Phone # [phone]  Fax # [phone]    Date of Birth Social Security Number Address Home Phone MRN    1955  [id number] [address] [phone] [id number]    Gnosticist Marital Status          Worship        Admission Date Admission Type Admitting Provider Attending Provider Department, Room/Bed    9/19/17 Elective Adithya Lynch DO  [facility] 6A, N606/1    Discharge Date Discharge Disposition Discharge Destination        9/20/2017 Home or Self Care             Attending Provider: (none)    Allergies:  Sulfa Antibiotics    Isolation:  None   Infection:  None   Code Status:  Not on file    Ht:  70\" (177.8 cm)   Wt:  206 lb 5.6 oz (93.6 kg)    Admission Cmt:  None   Principal Problem:  PAF (paroxysmal atrial fibrillation) [I48.0] More...                 Active Insurance as of 9/19/2017     Primary Coverage     Payor Plan Insurance Group Employer/Plan Group    [employer] BLUE [employer] PPO [id number]     Payor Plan Address Payor Plan Phone Number Effective From Effective To    [address] [phone] 1/1/2015     [place] [address]       Subscriber Name Subscriber Birth Date Member ID       ERASMO KNAPP JR. 1955 [id number]                 Emergency Contacts      (Rel.) Home Phone Work Phone Mobile Phone    Tamar Knapp (Spouse) [phone] -- [phone]               History & Physical      Adithya Lynch DO at 9/19/2017  8:51 AM          Electrophysiology History & Physical    Erasmo Knapp   2528/1  [id number]  [unfilled]  1955    DATE OF ADMISSION: 9/19/2017    Manuel Cooper MD    Chief complaint: afib     History of Present Illness    Patient is a 62 yo male with a hx of paroxysmal atrial fibrillation s/p PVA 5/24/16 that presents today for a redo PVA. He has had continued episodes of " "afib despite Tikosyn therapy. He is symptomatic with palpitations, dizziness, fatigue, and shortness of breath. He has not missed any doses of his Eliquis. He has episodes about once every 1-2 weeks and they last from minutes up to 8 hours. He has not had any recent hospital or ER visits. No issues with CP, SOA, PND, edema, orthopnea, fevers, chills.     Past Medical History:   Diagnosis Date   • AF (atrial fibrillation)    • Atrial flutter 8/25/2016    atrial fibrillation: Documentation by EKG, 08/25/2014. Patient is status post EP study and typical flutter ablation on 10/17/2014.   On 04/19/2014, patient noted palpitations at home and went to the emergency department.  On telemetry monitor at the outside hospital, patient was noted to be in type 1 atrial fibrillation with rates of about 140 beats per minute.  There was no atrial flutter noted o   • Barretts esophagus    • Chest pain    • Colitis 2014   • Colitis 2014   • Contact dermatitis due to ragweed     \"RAW THROAT\"   • CLAUDIA (generalized anxiety disorder)    • GERD (gastroesophageal reflux disease)     Clemons's esophagus H/O   • H/O atrial flutter    • Hyperlipidemia    • Lung cyst     benign   • Lung cyst     Benign    • Osteoarthritis    • Osteoarthritis    • PONV (postoperative nausea and vomiting)    • Sleeplessness    • Thrombocytopenia    • Tinnitus     Left ear Secondary to a viral illness    • Tinnitus of left ear     secondary to viral illness         Past Surgical History:   Procedure Laterality Date   • APPENDECTOMY     • CARDIAC ABLATION     • CHOLECYSTECTOMY     • COLECTOMY PARTIAL / TOTAL     • COLONOSCOPY     • HERNIA REPAIR     • INGUINAL HERNIA REPAIR Left    • KNEE SURGERY  1960   • LASIK     • LASIK     • WISDOM TOOTH EXTRACTION         Prescriptions Prior to Admission   Medication Sig Dispense Refill Last Dose   • cholecalciferol (VITAMIN D3) 1000 UNITS tablet Take 1,000 Units by mouth Every Morning.   9/18/2017 at Unknown time   • ELIQUIS " 5 MG tablet tablet TAKE 1 TABLET TWO TIMES A DAY 60 tablet 11 9/19/2017 at 0400   • Flaxseed, Linseed, (FLAXSEED OIL PO) Take 1,000 mg by mouth Every Morning.   9/18/2017 at Unknown time   • lansoprazole (PREVACID) 30 MG capsule Take 30 mg by mouth Every Morning Before Breakfast.   9/18/2017 at Unknown time   • Melatonin 3 MG capsule Take 1.5 mg by mouth Every Night.   9/18/2017 at Unknown time   • metoprolol tartrate (LOPRESSOR) 25 MG tablet Take 12.5 mg by mouth 3 (Three) Times a Day.   9/19/2017 at 0400   • vitamin B-12 (CYANOCOBALAMIN) 1000 MCG tablet Take 1,000 mcg by mouth Every Morning.   9/18/2017 at Unknown time   • diphenhydrAMINE (BENADRYL) 25 mg capsule Take 12.5 mg by mouth Every 6 (Six) Hours As Needed for Itching.   More than a month at Unknown time   • dofetilide (TIKOSYN) 500 MCG capsule TAKE 1 CAPSULE TWO TIMES A DAY 60 capsule 5 9/13/2017       Current Meds  No current facility-administered medications on file prior to encounter.      Current Outpatient Prescriptions on File Prior to Encounter   Medication Sig Dispense Refill   • cholecalciferol (VITAMIN D3) 1000 UNITS tablet Take 1,000 Units by mouth Every Morning.     • ELIQUIS 5 MG tablet tablet TAKE 1 TABLET TWO TIMES A DAY 60 tablet 11   • Flaxseed, Linseed, (FLAXSEED OIL PO) Take 1,000 mg by mouth Every Morning.     • lansoprazole (PREVACID) 30 MG capsule Take 30 mg by mouth Every Morning Before Breakfast.     • metoprolol tartrate (LOPRESSOR) 25 MG tablet Take 12.5 mg by mouth 3 (Three) Times a Day.     • dofetilide (TIKOSYN) 500 MCG capsule TAKE 1 CAPSULE TWO TIMES A DAY 60 capsule 5   • [DISCONTINUED] melatonin 1 MG tablet Take 1.5 mg by mouth Every Night.           Social History     Social History   • Marital status:      Spouse name: N/A   • Number of children: N/A   • Years of education: N/A     Occupational History   • Not on file.     Social History Main Topics   • Smoking status: Never Smoker   • Smokeless tobacco: Never  "Used   • Alcohol use No   • Drug use: No   • Sexual activity: Defer     Other Topics Concern   • Not on file     Social History Narrative       Family History   Problem Relation Age of Onset   • Arrhythmia Mother    • Hypertension Mother    • Diabetes Mother    • Heart disease Father            REVIEW OF SYSTEMS:   12 point ROS was performed and is negative except as outlined in HPI           Objective:     Vitals:    09/19/17 0825 09/19/17 0826   BP: 129/86 133/84   BP Location: Right arm Left arm   Patient Position: Lying Lying   Pulse: 80    Resp: 18    Temp: 98.7 °F (37.1 °C)    TempSrc: Tympanic    SpO2: 97%    Weight: 206 lb 5.6 oz (93.6 kg)    Height: 70\" (177.8 cm)      Body mass index is 29.61 kg/(m^2).  Flowsheet Rows         First Filed Value    Admission Height  70\" (177.8 cm) Documented at 09/19/2017 0825    Admission Weight  206 lb 5.6 oz (93.6 kg) Documented at 09/19/2017 0825          General Appearance:    Alert, cooperative, in no acute distress   Head:    Normocephalic, without obvious abnormality, atraumatic   Eyes:            Lids and lashes normal, conjunctivae and sclerae normal, no   icterus, no pallor, corneas clear, PERRLA   Ears:    Ears appear intact with no abnormalities noted       Neck:  supple,no thyromegaly, no carotid bruit, no JVD       Lungs:     Clear to auscultation,respirations regular, even and unlabored    Heart:    Regular rhythm and normal rate, normal S1 and S2, no murmur, no gallop, no rub, no click   Chest Wall:    No abnormalities observed   Abdomen:     Normal bowel sounds, no masses, no organomegaly, soft        non-tender, non-distended, no guarding, no rebound  tenderness   Extremities:   Moves all extremities well, no edema, no cyanosis, no redness   Pulses:   Pulses palpable and equal bilaterally. Normal radial, carotid, femoral, dorsalis pedis and posterior tibial pulses bilaterally. Normal abdominal aorta   Skin:   No bleeding, bruising or rash       sychiatric: "   Alert and oented x 3, normal mood and affect.   Lab Review:      Results Review:     I reviewed the patient's new clinical results.      Results from last 7 days  Lab Units 09/18/17  1032   WBC 10*3/mm3 5.04   HEMOGLOBIN g/dL 15.5   HEMATOCRIT % 46.6   PLATELETS 10*3/mm3 183       Results from last 7 days  Lab Units 09/18/17  1002   SODIUM mmol/L 139   POTASSIUM mmol/L 4.3   CHLORIDE mmol/L 103   CO2 mmol/L 29.0   BUN mg/dL 13   CREATININE mg/dL 0.90   CALCIUM mg/dL 9.3   GLUCOSE mg/dL 132*       Results from last 7 days  Lab Units 09/18/17  1002   SODIUM mmol/L 139   POTASSIUM mmol/L 4.3   CHLORIDE mmol/L 103   CO2 mmol/L 29.0   BUN mg/dL 13   CREATININE mg/dL 0.90   GLUCOSE mg/dL 132*   CALCIUM mg/dL 9.3       Results from last 7 days  Lab Units 09/18/17  1002   INR  0.96         I personally viewed and interpreted the patient's EKG/Telemetry data    Assessment:    Principal Problem:    PAF (paroxysmal atrial fibrillation)       Plan:       Patient with symptomatic recurrent PAF despite Tikosyn and previous PVA in the past. He will go for redo PVA. Risks, benefits and alternatives have been discussed and patient wishes to proceed. Continue Eliquis 5mg BID for a/c.       Scribed for Adithya Lynch DO by Christine Eubanks PA-C. 9/19/2017  8:52 AM      ULICES Carey  [facility]  09/19/17  8:52 AM    I, Adithya Lynch, personally performed the services described in this documentation as scribed by the above named individual in my presence, and it is both accurate and complete.  9/19/2017  4:10 PM    Adithya Lynch DO  4:10 PM  09/19/17           Electronically signed by Adithya Lynch DO at 9/19/2017  4:10 PM        Operative/Procedure Notes (last 7 days) (Notes from 9/15/2017 11:44 AM through 9/22/2017 11:44 AM)     No notes of this type exist for this encounter.           Physician Progress Notes (last 7 days) (Notes from 9/15/2017 11:44 AM through 9/22/2017 11:44 AM)      Adithya Lynch DO at  9/20/2017  8:34 AM  Version 1 of 1         [facility]  Cardiovascular Progress Note  Mike Knapp Jr.  N606/1  [id number]  1955    DATE OF ADMISSION: 9/19/2017  DATE OF FOLLOW UP:  9/20/17     Manuel Cooper MD    Subjective:   Subjective Patient ID: Mike Knapp Jr. is a 61 y.o. male.    Chief Complaint: afib f/u     Allergies   Allergen Reactions   • Sulfa Antibiotics Rash       Current Facility-Administered Medications:   •  apixaban (ELIQUIS) tablet 5 mg, 5 mg, Oral, Q12H, ULICES Rothman, 5 mg at 09/19/17 2115  •  flecainide (TAMBOCOR) tablet 50 mg, 50 mg, Oral, Q12H, Adithya Cris, DO, 50 mg at 09/19/17 2115  •  lactated ringers infusion, 9 mL/hr, Intravenous, Continuous, Quan Hoang MD, Stopped at 09/19/17 1859  •  lidocaine (XYLOCAINE) 2 % jelly, , Urethral, Once, Adithya Cris, DO  •  metoprolol tartrate (LOPRESSOR) tablet 12.5 mg, 12.5 mg, Oral, Q8H, ULICES Rothman, 12.5 mg at 09/20/17 0655  •  ondansetron (ZOFRAN) injection 4 mg, 4 mg, Intravenous, Q6H PRN, Adithya Cris, DO  •  oxyCODONE-acetaminophen (PERCOCET) 5-325 MG per tablet 1 tablet, 1 tablet, Oral, Q4H PRN, Adithya Cris, DO, 1 tablet at 09/19/17 2115  •  pantoprazole (PROTONIX) EC tablet 40 mg, 40 mg, Oral, QAM, ULICES Rothman, 40 mg at 09/20/17 0655  •  Scopolamine (TRANSDERM-SCOP) 1.5 MG/3DAYS patch 1 patch, 1 patch, Transdermal, Q72H, Adithya Cris, DO, 1 patch at 09/19/17 0910  •  sucralfate (CARAFATE) tablet 1 g, 1 g, Oral, 4x Daily AC & at Bedtime, Adithya Cris, DO, 1 g at 09/20/17 0655    History of Present Illness    Groins are a little sore this morning. Has not urinated yet. Has ambulated with no issue.     ROS   14 point ROS negative except as outlined in problem list, HPI and other parts of the note.    Procedures      Objective:   Objective   Vitals:    09/20/17 0100 09/20/17 0300 09/20/17 0500 09/20/17 0655   BP: 111/75 115/74 103/71 108/73   BP  Location:  Right arm     Patient Position:  Lying     Pulse: 93 87 80 83   Resp:  16     Temp:  97.1 °F (36.2 °C)     TempSrc:  Temporal Artery      SpO2: 96% 97% 93%    Weight:       Height:           Intake/Output Summary (Last 24 hours) at 09/20/17 0834  Last data filed at 09/20/17 0330   Gross per 24 hour   Intake             1100 ml   Output             2150 ml   Net            -1050 ml       GENERAL: Well-developed, well-nourished patient in no acute distress.  NECK: No JVD present at 30°. No carotid bruits auscultated.  LUNGS: Clear to auscultation.  CARDIOVASCULAR: Heart has a regular rate and rhythm. No murmurs, gallops or rubs noted.   EXT: pulses intact, no swelling. Groin sites with no hematoma.   SKIN: Pink, warm  Neuro: Nonfocal exam. Gait intact    The patient's old records including ambulatory rhythm recordings (ECGs, Holter/event monitor) were reviewed and discussed.      Lab Review:     Results from last 7 days  Lab Units 09/18/17  1002   SODIUM mmol/L 139   POTASSIUM mmol/L 4.3   CHLORIDE mmol/L 103   CO2 mmol/L 29.0   BUN mg/dL 13   CREATININE mg/dL 0.90   GLUCOSE mg/dL 132*   CALCIUM mg/dL 9.3           Results from last 7 days  Lab Units 09/18/17  1032 09/18/17  1002   WBC 10*3/mm3 5.04 5.28   HEMOGLOBIN g/dL 15.5 15.6   HEMATOCRIT % 46.6 46.0   PLATELETS 10*3/mm3 183 187       Results from last 7 days  Lab Units 09/18/17  1002   INR  0.96     EKG NSR with intervals wnl. QRS in the low 80s    Assessment & Plan:     1. Symptomatic recurrent PAF despite Tikosyn and previous PVA  - s/p redo PVA with reconnection along the right superior and right inferior pulm veins w/ eventual complete isolation of all veins. Ablation also along fractionated signals on posterior wall and a roof line   - patient will be discharged on Flecainide and Lopressor   - continue NOAC     Patient will be dis-charged home in stable condition after he has voided. Follow-up in afib clinic in one month and Dr. Lynch in 8-10  ULICES Mckeon  17  8:34 AM    I, Adithya Lynch, have reviewed the note in full and agree with all aspects of the above including physical exam, assessment, labs and plan with changes made accordingly. Face to Face Time was spent with the patient.    Adithya Lynch DO  17  9:03 AM           Electronically signed by Adithya Lynch DO at 2017  9:03 AM        [facility] EP LAB  [address] [id number] [phone]             [facility] EP LAB  [address] [id number] [phone]      Mike Knapp Jr.   EP/CRM Study   Order# [id number]   Reading physician: Adithya Lynch DO Ordering physician: Adithya Lynch DO Study date: 17   Patient Information   Patient Name MRN Sex  (Age)   Mike Knapp Jr. [id number] Male 1955 (61 y.o.)   Physicians   Panel Physicians Referring Physician Case Authorizing Physician   Adithya Lynch DO (Primary)  Adithay Lynch DO   Procedures   Ablation atrial fibrillation   Indications   PAF (paroxysmal atrial fibrillation) [I48.0 (ICD-10-CM)]   Pre-procedure Diagnosis   PAF   Conclusion   PROCEDURE NOTE - PVA, Redo     DATE OF PROCEDURE:                                   17     REFERRING PHYSICIAN:                                 Adithya Lynch  ADMITTING PHYSICIAN:                                  Adithya Lynch     PROCEDURE PERFORMED:  1. Diagnostic electrophysiology study with radiofrequency ablation of the pulmonary veins (redo PVA), roof line and along fractioned signals on the posterior wall  2. Transseptal catheterization to the left atrium x2.  3. Intracardiac ultrasound monitoring.  4. Esophageal temperature monitoring.   5. Full EP study +/- Isuprel     INDICATIONS FOR THIS PRODEDURE:   It is a 61-year-old male with a history of symptomatic paroxysmal atrial fibrillation despite a previous pulm vein ablation and use of  antiarrhythmic drugs.  He continues to have recurrent spells of symptomatic atrial fibrillation and currently is on dofetilide.  Therefore presents today for a redo pulmonary vein ablation.  Aguilar's all risk and benefits and wished to proceed.        MEDICATIONS ADMINISTERED DURING THE STUDY:  1. General.  2. Dye.  3. Heparin, Protamine.  4. Lasix.     Estimated Blood Loss: Minimal     DESCRIPTION OF THE PROCEDURE:  The patient was brought to the Cardiovascular Laboratory in a fasting, nonsedated state. The risks and benefits of general anesthesia, diagnostic electrophysiology study, and possible radiofrequency ablation were explained to the patient and written informed consent was obtained. The patient was then prepped and draped in the usual sterile manner. Access through the right internal jugular vein, right femoral vein, and left femoral vein was then achieved x5 using the modified Seldinger technique, and the following sheaths and catheters were inserted:     1. A 7 Djiboutian Pathfulense FJ coronary sinus catheter was advanced via a 7-Djiboutian sheath in the left femoral vein to the coronary sinus.     2. A 3.5 mm, ThermoCool ablation catheter was advanced via a long, intravascular, 8.5-Djiboutian sheath to the left atrium for mapping and ablation, as well as the right atrium     3. A 6-Djiboutian, PentaRay catheter was advanced via a long, intravascular, 8-Djiboutian sheath to the left atrium for mapping.      4. A 10-Djiboutian, intracardiac ultrasound catheter was advanced via an 11-Djiboutian sheath in the left femoral vein to the high right atrium for reconstruction of the left atrial structures.      A diagnostic electrophysiology study was then performed. The patient arrived at the clinical electrophysiology laboratory in [facility] with a ventricular rate of 65 beats per minute. The QRS width was measured at 84 milliseconds, and a QT interval was measured at 340 milliseconds. The HV interval was measured at 48 milliseconds and GA of  175 msec. Intravenous heparin was initiated during the study to maintain activated clotting times greater than 350 seconds. A left atrial map was then reconstructed using a 3-dimensional mapping system. Transseptal catheterization to the left atrium was then performed x2 using a Daig transseptal needle. A previously obtained CT scan was then downloaded. A left atrial map was then reconstructed using a 3-dimensional mapping system.  There was noted to be reconnection along the right superior and right inferior pulmonary veins as well as signals along the roof and posterior wall.  Radiofrequency ablation was then performed in a wide set of circumferential lesions along the right superior and right inferior pulmonary veins with complete isolation after ablation.  All four pulmonary veins were isolated. Radiofrequency ablation was also performed along fractionated signals along the posterior wall as well as a roof line was created in the left atrium.  RFA was performed with a maximum temperature of 52 degrees and maximum elder of 35 elder. During all RFAs, the patient's esophageal temperature was monitored. If the temperature chi to 36.5 degrees, radiofrequency ablation was terminated. Post-radiofrequency ablation, isoproterenol at 10 mcg/minute was then started. The patient's resting heart rate increased to 130 beats per minute, junctional in nature. The AV block point on isoproterenol was measured at less than 210 milliseconds. The AV block point off isoproterenol was measured at 300 milliseconds. The VA block point on isoproterenol was measured at 250 milliseconds. The VA block point off isoproterenol was measured at 330 milliseconds. Retrograde atrial activation was concentric. High right atrial extrastimuli testing and right atrial burst pacing were then performed with no inducible atrial arrhythmias.   No further inducible arrhythmias were seen. The sheaths and catheters were then removed, and the hemostasis was  achieved with direct manual pressure. The patient tolerated the procedure without difficulty and was transferred to their room in a stable condition.  COMPLICATIONS:                    None.     FINAL IMPRESSIONS:  1. Successful radiofrequency ablation along reconnection areas with ablation along the right superior and right inferior pulmonary veins with complete isolation of all 4 veins noted at the end of the case.  Ablation was also done along fractionated signals on the posterior wall as well as a roof line was created in the left atrium.   2. Successful transseptal catheterization to the left atrium X2  3. Successful intracardiac ultrasound monitoring  4. Successful esophageal temperature monitoring  5. Full EP study +/- Isuprel     RECOMMENDATIONS:  1. The patient will be monitored on telemetry  2. If stable, the patient will be discharged to home in the AM  3. Continue on NOAC for anticoagulation and Flec, Metoprolol   4. Follow up with Afib clinic in one month and with me in 10-12 weeks     Adithya Lynch DO  09/19/17  4:10 PM         Implants      No implant documentation for this case.   PACS Images   Show images for EP/CRM Study   Signed   Electronically signed by Adithya Lynch DO on 9/19/17 at 1617 EDT   Encounter-Level Cardiology Documents:   There are no encounter-level cardiology documents.   Link to Procedure Log   Procedure Log   Printable Result Report   Result Report    Encounter   View Encounter      Results Routing Tracking   View Results Routing Information   EP/CRM Study [EP22] (Order [id number])   Electrophysiology   Date: 8/15/2017 Department: [address] Released By: Peggy Castrejon Rep Authorizing: Adithya Lynch DO   Order-Level Documents:   Scan on 9/19/2017 4:00 PM : EP STUDY - SCANScan on 9/19/2017 4:00 PM : EP STUDY - SCAN   Order History   Inpatient   Date/Time Action Taken User Additional Information   08/15/17 1627 Release Peggy Castrejon  Rep From Order: [id number]   09/19/17 1247 Result Niya Ivey In process   09/19/17 1610 Result Adithya Lynch DO Final   09/20/17 1100 Complete Henrietta Hodges    Order Details   Frequency Duration Priority Order Class   Once 1  occurrence Routine Hospital Performed   Start Date/Time   Start Date Start Time   08/15/17 1628   Procedures Performed   Code Procedure Name   EP23 ABLATION A-FIB   Cosign Order Info   Action Created on Responsible Provider Signed by Signed on   Ordering 09/22/17 0525 DO Adithya Giraldo DO 09/22/17 0829   Completion Info   User Date/Time   Henrietta Hodges 09/20/17 1100   Clinical Indications      ICD-10-CM ICD-9-CM   PAF (paroxysmal atrial fibrillation)     I48.0 427.31   Acknowledgement Info   For At Acknowledged By Acknowledged On   Placing Order 08/15/17 1627 Va Ridley RN 09/19/17 0828   Reprint Order Requisition   EP/CRM Study (Order #[id number]) on 8/15/17   Encounter-Level Cardiology Documents:   There are no encounter-level cardiology documents.   Encounter   View Encounter   Appointments for this Order   9/19/2017   [facility]   Order Provider Info       Office phone Pager E-mail   Ordering User Peggy Castrejon Rep -- -- --   Authorizing Provider Adithya Lynch [phone] -- --   Billing Provider Adithya Lynch [phone] -- --   Linked Charges   No charges linked to this procedure   Order Transmittal Tracking   EP/CRM Study (Order #[id number]) on 8/15/17   Authorized by:  Adithya Lynch DO  (NPI: [id number])       Reviewed By Maria L Lynch DO on 9/19/2017  5:57 PM       Consult Notes (last 7 days) (Notes from 09/15/17 through 09/22/17)     No notes of this type exist for this encounter.

## 2017-10-20 ENCOUNTER — HOSPITAL ENCOUNTER (OUTPATIENT)
Dept: CARDIOLOGY | Facility: HOSPITAL | Age: 62
Discharge: HOME | End: 2017-10-20
Admitting: NURSE PRACTITIONER

## 2017-10-20 ENCOUNTER — OFFICE VISIT (OUTPATIENT)
Dept: CARDIOLOGY | Facility: HOSPITAL | Age: 62
End: 2017-10-20

## 2017-10-20 VITALS
HEART RATE: 90 BPM | OXYGEN SATURATION: 98 % | SYSTOLIC BLOOD PRESSURE: 117 MMHG | BODY MASS INDEX: 29.84 KG/M2 | WEIGHT: 208.4 LBS | TEMPERATURE: 98.1 F | RESPIRATION RATE: 18 BRPM | DIASTOLIC BLOOD PRESSURE: 72 MMHG | HEIGHT: 70 IN

## 2017-10-20 DIAGNOSIS — I48.0 PAF (PAROXYSMAL ATRIAL FIBRILLATION) (HCC): ICD-10-CM

## 2017-10-20 DIAGNOSIS — I48.3 TYPICAL ATRIAL FLUTTER (HCC): ICD-10-CM

## 2017-10-20 DIAGNOSIS — I48.0 PAF (PAROXYSMAL ATRIAL FIBRILLATION) (HCC): Primary | ICD-10-CM

## 2017-10-20 DIAGNOSIS — Z79.01 CHRONIC ANTICOAGULATION: ICD-10-CM

## 2017-10-20 PROCEDURE — 99213 OFFICE O/P EST LOW 20 MIN: CPT | Performed by: NURSE PRACTITIONER

## 2017-10-20 PROCEDURE — 93010 ELECTROCARDIOGRAM REPORT: CPT | Performed by: INTERNAL MEDICINE

## 2017-10-20 PROCEDURE — 93005 ELECTROCARDIOGRAM TRACING: CPT | Performed by: NURSE PRACTITIONER

## 2017-10-20 NOTE — PROGRESS NOTES
Encounter Date:10/20/2017      Patient ID: Mike Knapp Jr. is a 61 y.o. male.        Subjective:     Chief Complaint: Establish Care (9/19/17- PVA)     History of Present Illness   [facility]. [facility] to follow up after his PVA.  He has a history of Aflutter ablation in 2014 and persistent afib s/p PVA 9/19. He has done very well since PVA. Energy has improved. No palpitations, no chest pain and no post-procedural complications. No shortness of breath.     Patient Active Problem List   Diagnosis   • Chest pain   • Thrombocytopenia   • Hyperlipidemia   • Osteoarthritis   • PAF (paroxysmal atrial fibrillation)         Past Surgical History:   Procedure Laterality Date   • APPENDECTOMY     • CARDIAC ABLATION     • CARDIAC ELECTROPHYSIOLOGY PROCEDURE N/A 9/19/2017    Procedure: Ablation atrial fibrillation;  Surgeon: Adithya Lynch DO;  Location: [facility] INVASIVE LOCATION;  Service:    • CHOLECYSTECTOMY     • COLECTOMY PARTIAL / TOTAL     • COLONOSCOPY     • HERNIA REPAIR     • INGUINAL HERNIA REPAIR Left    • KNEE SURGERY  1960   • LASIK     • LASIK     • WISDOM TOOTH EXTRACTION         Allergies   Allergen Reactions   • Sulfa Antibiotics Rash         Current Outpatient Prescriptions:   •  cholecalciferol (VITAMIN D3) 1000 UNITS tablet, Take 1,000 Units by mouth Every Morning., Disp: , Rfl:   •  diphenhydrAMINE (BENADRYL) 25 mg capsule, Take 12.5 mg by mouth Every 6 (Six) Hours As Needed for Itching., Disp: , Rfl:   •  ELIQUIS 5 MG tablet tablet, TAKE 1 TABLET TWO TIMES A DAY, Disp: 60 tablet, Rfl: 11  •  Flaxseed, Linseed, (FLAXSEED OIL PO), Take 1,000 mg by mouth Every Morning., Disp: , Rfl:   •  flecainide (TAMBOCOR) 50 MG tablet, Take 1 tablet by mouth Every 12 (Twelve) Hours., Disp: 60 tablet, Rfl: 3  •  lansoprazole (PREVACID) 30 MG capsule, Take 30 mg by mouth Every Morning Before Breakfast., Disp: , Rfl:   •  Melatonin 3 MG capsule, Take 1.5 mg by mouth Every Night., Disp: ,  Rfl:   •  metoprolol tartrate (LOPRESSOR) 25 MG tablet, Take 12.5 mg by mouth 3 (Three) Times a Day., Disp: , Rfl:   •  vitamin B-12 (CYANOCOBALAMIN) 1000 MCG tablet, Take 1,000 mcg by mouth Every Morning., Disp: , Rfl:     The following portions of the chart were reviewed and updated as appropriate: Allergies, current medications, past family history, social history, past medical history.     Review of Systems   Constitution: Negative for chills, decreased appetite, diaphoresis, fever, weakness, malaise/fatigue, night sweats, weight gain and weight loss.   HENT: Negative for congestion, hearing loss, hoarse voice and nosebleeds.    Eyes: Negative for blurred vision, visual disturbance and visual halos.   Cardiovascular: Negative for chest pain, claudication, cyanosis, dyspnea on exertion, irregular heartbeat, leg swelling, near-syncope, orthopnea, palpitations, paroxysmal nocturnal dyspnea and syncope.   Respiratory: Negative for cough, hemoptysis, shortness of breath, sleep disturbances due to breathing, snoring, sputum production and wheezing.    Hematologic/Lymphatic: Negative for bleeding problem. Does not bruise/bleed easily.   Skin: Negative for dry skin, itching and rash.   Musculoskeletal: Negative for arthritis, joint pain, joint swelling and myalgias.   Gastrointestinal: Positive for diarrhea. Negative for bloating, abdominal pain, constipation, flatus, heartburn, hematemesis, hematochezia, melena, nausea and vomiting.   Genitourinary: Negative for dysuria, frequency, hematuria, nocturia and urgency.   Neurological: Negative for excessive daytime sleepiness, dizziness, headaches, light-headedness and loss of balance.   Psychiatric/Behavioral: Negative for depression. The patient does not have insomnia and is not nervous/anxious.    Allergic/Immunologic: Positive for environmental allergies.           Objective:     Vitals:    10/20/17 1302 10/20/17 1305 10/20/17 1306   BP: 119/75 118/69 117/72   BP  "Location: Right arm Left arm Left arm   Patient Position: Sitting Sitting Standing   Pulse: 84 85 90   Resp: 18     Temp: 98.1 °F (36.7 °C)     TempSrc: Temporal Artery      SpO2: 98%     Weight: 208 lb 6.4 oz (94.5 kg)     Height: 69.5\" (176.5 cm)           Physical Exam   Constitutional: He is oriented to person, place, and time. He appears well-developed and well-nourished. He is active and cooperative. No distress.   HENT:   Head: Normocephalic and atraumatic.   Mouth/Throat: Oropharynx is clear and moist.   Eyes: Conjunctivae and EOM are normal. Pupils are equal, round, and reactive to light.   Neck: Normal range of motion. Neck supple. No JVD present. No tracheal deviation present. No thyromegaly present.   Cardiovascular: Normal rate, regular rhythm, normal heart sounds and intact distal pulses.    Pulmonary/Chest: Effort normal and breath sounds normal.   Abdominal: Soft. Bowel sounds are normal. He exhibits no distension. There is no tenderness.   Musculoskeletal: Normal range of motion.   Neurological: He is alert and oriented to person, place, and time.   Skin: Skin is warm, dry and intact.   Psychiatric: He has a normal mood and affect. His behavior is normal.   Nursing note and vitals reviewed.      Lab and Diagnostic Review:   EPS 9/19/17     FINAL IMPRESSIONS:  1. Successful radiofrequency ablation along reconnection areas with ablation along the right superior and right inferior pulmonary veins with complete isolation of all 4 veins noted at the end of the case.  Ablation was also done along fractionated signals on the posterior wall as well as a roof line was created in the left atrium.   2. Successful transseptal catheterization to the left atrium X2  3. Successful intracardiac ultrasound monitoring  4. Successful esophageal temperature monitoring  5. Full EP study +/- Isuprel     EKG today shows NSR  Assessment and Plan:         1. PAF (paroxysmal atrial fibrillation)  - Continue flecainide and " Eliquis   chads vasc 0  - Keep follow up with Dr. Lynch 1/15  - AFIB education provided today including: s/s, use of anticoagulation, treatment of atrial fibrillation, Chads Vasc and the role of the afib center. Discussed stroke prevention and causes of afib, triggers of afib and medication management.   - ECG 12 Lead; Future  2. Typical atrial flutter  Continue flecainide and eliquis  3. Chronic anticoagulation  Denies any s/s of bleeding  It has been a pleasure to participate in the care of this patient.  Patient was instructed to call the [facility] with any questions, concerns, or worsening symptoms.    * Please note that portions of this note were completed with a voice recognition program. Efforts were made to edit the dictation but occasionally words are transcribed.

## 2017-11-02 ENCOUNTER — TELEPHONE (OUTPATIENT)
Dept: CARDIOLOGY | Facility: CLINIC | Age: 62
End: 2017-11-02

## 2017-11-02 NOTE — TELEPHONE ENCOUNTER
Patient is requesting clearance to have an EGD. He has a mild case of Clemons's esophagus and wants to monitor it closely.

## 2017-12-20 RX ORDER — APIXABAN 5 MG/1
TABLET, FILM COATED ORAL
Qty: 60 TABLET | Refills: 6 | Status: SHIPPED | OUTPATIENT
Start: 2017-12-20 | End: 2018-05-14 | Stop reason: ALTCHOICE

## 2018-01-15 ENCOUNTER — OFFICE VISIT (OUTPATIENT)
Dept: CARDIOLOGY | Facility: CLINIC | Age: 63
End: 2018-01-15

## 2018-01-15 VITALS
WEIGHT: 209 LBS | DIASTOLIC BLOOD PRESSURE: 80 MMHG | HEIGHT: 69 IN | BODY MASS INDEX: 30.96 KG/M2 | HEART RATE: 80 BPM | SYSTOLIC BLOOD PRESSURE: 112 MMHG

## 2018-01-15 DIAGNOSIS — I48.0 PAF (PAROXYSMAL ATRIAL FIBRILLATION) (HCC): Primary | ICD-10-CM

## 2018-01-15 PROCEDURE — 93000 ELECTROCARDIOGRAM COMPLETE: CPT | Performed by: INTERNAL MEDICINE

## 2018-01-15 PROCEDURE — 99213 OFFICE O/P EST LOW 20 MIN: CPT | Performed by: INTERNAL MEDICINE

## 2018-05-14 ENCOUNTER — OFFICE VISIT (OUTPATIENT)
Dept: CARDIOLOGY | Facility: CLINIC | Age: 63
End: 2018-05-14

## 2018-05-14 VITALS
SYSTOLIC BLOOD PRESSURE: 120 MMHG | HEART RATE: 96 BPM | WEIGHT: 213.4 LBS | BODY MASS INDEX: 31.61 KG/M2 | DIASTOLIC BLOOD PRESSURE: 82 MMHG | HEIGHT: 69 IN

## 2018-05-14 DIAGNOSIS — E78.2 MIXED HYPERLIPIDEMIA: ICD-10-CM

## 2018-05-14 DIAGNOSIS — I48.0 PAF (PAROXYSMAL ATRIAL FIBRILLATION) (HCC): Primary | ICD-10-CM

## 2018-05-14 PROCEDURE — 99213 OFFICE O/P EST LOW 20 MIN: CPT | Performed by: INTERNAL MEDICINE

## 2018-05-14 PROCEDURE — 93000 ELECTROCARDIOGRAM COMPLETE: CPT | Performed by: INTERNAL MEDICINE

## 2018-05-14 RX ORDER — ASPIRIN 81 MG/1
81 TABLET ORAL DAILY
COMMUNITY

## 2018-05-14 NOTE — PROGRESS NOTES
Subjective:   Mike Knapp Jr.  1955  [phone]  [phone]    05/14/2018    [facility] CARDIOLOGY    Manuel Cooper MD  [address] [place] [address] [address] [place] [address]    REFERRING DOCTOR: Manuel Cooper      Patient ID: Mike Knapp Jr. is a 62 y.o. male.    Chief Complaint:   Chief Complaint   Patient presents with   • Atrial Fibrillation     Problem List:      1. Chest pain:  a. Symptoms of twinges of chest pain occurring at rest x3 months.   b. Exercise GXT, Dr. Welch: Exercise duration 4 minutes and 23 seconds with no evidence of inducible ischemia.   c. Echocardiogram, 05/27/2014, Dr. Welch, revealing normal left ventricular ejection fraction (60%) with normal valvular structures.   2. Atrial flutter/atrial fibrillation:  a. Documentation by EKG, 08/25/2014.  b. Patient is status post EP study and typical flutter ablation on 10/17/2014.   c. On 04/19/2014, patient noted palpitations at home and went to the emergency department. On telemetry monitor at the outside hospital, patient was noted to be in type 1 atrial fibrillation with rates of about 140 beats per minute. There was no atrial flutter noted on the telemetry strip or telemetry EKGs at this hospitalization.  d. Patient was started on sotalol 80 mg b.i.d. and continued on his Xarelto 20 mg once a day for this new onset detection of atrial fibrillation, status post atrial flutter ablation. CHADS score = 0 with questionable history of hypertension.   e. Patient is currently only on aspirin due to the CHADS score of 0 and per patient’s request even after explanation of his CHADS score of possibly 1 with questionable hypertension and recurrent episode of atrial fibrillation as noted. The atrial fibrillation is very short-lived in nature.  f. Patient with continued episodes of short-lived atrial fibrillation even on the sotalol. Patient has stated that he has tried to taper off his sotalol, and whenever he is  decreased to 40 mg of the sotalol, he states he has had recurrence of his atrial fibrillation with RVR.  g. Patient states that he thinks the sotalol has been causing him symptoms of feeling drained and also a heavy pressure throughout his body is noted.   h. Patient’s sotalol has been discontinued, and he is currently maintained on flecainide 50 mg b.i.d. and metoprolol 12.5 mg b.i.d. He is currently on aspirin, since he has a CHADS score of 0 with no long episodes of atrial fibrillation at the present time.   i. Patient maintained currently on flecainide 50 mg b.i.d. and aspirin with pill-in-the-pocket Xarelto. Patient has a CHADS-VASc score of 0. Patient states his last episodes of atrial fibrillation were in July and September, which lasted about 1 to 1-1/2 hours.   j. Tikosyn 2/2017 with recurrent Afib and PACs  k. Jan 2017 Zio patch showed recurrent episodes of symptomatic atrial arrhythmias including atrial fibrillation.  l. S/P redo PVA with ablation along the roof of the RANDY with a roof and along fractioned signals on the posterior wall. 9/19/2017  3. Thrombocytopenia:  a. Platelet count in October 2015 was 217 and most recent 92.  4. Hyperlipidemia.   5. Generalized anxiety disorder.   6. History of Clemons's esophagus/gastroesophageal reflux disease.   7. Osteoarthritis.   8. Recent colitis in 2014.  9. Left ear tinnitus secondary to a viral illness.   10. Benign lung cyst.        Allergies   Allergen Reactions   • Sulfa Antibiotics Rash       Current Outpatient Prescriptions:   •  aspirin 81 MG EC tablet, Take 81 mg by mouth Daily., Disp: , Rfl:   •  cholecalciferol (VITAMIN D3) 1000 UNITS tablet, Take 1,000 Units by mouth Every Morning., Disp: , Rfl:   •  diphenhydrAMINE (BENADRYL) 25 mg capsule, Take 12.5 mg by mouth Every 6 (Six) Hours As Needed for Itching., Disp: , Rfl:   •  Flaxseed, Linseed, (FLAXSEED OIL PO), Take 1,000 mg by mouth Every Morning., Disp: , Rfl:   •  lansoprazole (PREVACID) 30 MG  "capsule, Take 30 mg by mouth Every Morning Before Breakfast., Disp: , Rfl:   •  Melatonin 3 MG capsule, Take 1.5 mg by mouth Every Night., Disp: , Rfl:   •  metoprolol tartrate (LOPRESSOR) 25 MG tablet, Take 0.5 tablets by mouth Every 12 (Twelve) Hours. (Patient taking differently: Take 12.5 mg by mouth 3 (Three) Times a Day.), Disp: 60 tablet, Rfl: 11  •  vitamin B-12 (CYANOCOBALAMIN) 1000 MCG tablet, Take 1,000 mcg by mouth Every Morning., Disp: , Rfl:     History of Present Illness  62-year-old male with a history of paroxsymal symptomatic atrial fibrillation now status post redo pulmonary vein ablation in September 2017 here for follow-up visit.  He is currently off of his medications including blood thinners. No major issues with any palpitations or any recurrences of Afib noted. Occ skipped beats but no Afib noted. Minimal amount but not significant. Also, at times rates in the 80-90s as well and seems to be higher now than what has been in the past but not similar to AFib episodes he had in the past.     No issues with chest pain, shortness of breath, fevers, chills, night sweats, PND, orthopnea. No recent ER visits or hospital stays.      The following portions of the patient's history were reviewed and updated as appropriate: allergies, current medications, past family history, past medical history, past social history, past surgical history and problem list.    ROS   14 point ROS negative except as outlined in problem list, HPI and other parts of the note.      ECG 12 Lead  Date/Time: 5/14/2018 11:35 AM  Performed by: MICHELINE CHILDERS  Authorized by: MICHELINE CHILDERS   Rhythm: sinus rhythm  Comments: HR 95 bpm, Normal intervals.                Objective:       Vitals:    05/14/18 1101   BP: 120/82   BP Location: Left arm   Patient Position: Sitting   Pulse: 96   Weight: 96.8 kg (213 lb 6.4 oz)   Height: 175.3 cm (69\")       GENERAL: Well-developed, well-nourished patient in no acute distress.  HEENT: " Normocephalic, atraumatic, PERRLA. Moist mucous membranes.  NECK: No JVD present at 30°. No carotid bruits auscultated.  LUNGS: Clear to auscultation.  CARDIOVASCULAR: Heart has a regular rate and rhythm. No murmurs, gallops or rubs noted.   ABDOMEN: Soft, nontender. Positive bowel sounds.  MUSCULOSKELETAL: No gross deformities. No clubbing, cyanosis, or lower extremity edema.  SKIN: Pink, warm  Neuro: Nonfocal exam. Gait intact  Ext: No edema or bruising    The patient's old records including ambulatory rhythm recordings (ECGs, Holter/event monitor) were reviewed and discussed.      Lab Review:   Results for orders placed or performed during the hospital encounter of 09/19/17   POC Activated Clotting Time   Result Value Ref Range    Activated Clotting Time  290 (H) 82 - 152 Seconds   POC Activated Clotting Time   Result Value Ref Range    Activated Clotting Time  356 (H) 82 - 152 Seconds   POC Activated Clotting Time   Result Value Ref Range    Activated Clotting Time  367 (H) 82 - 152 Seconds   POC Activated Clotting Time   Result Value Ref Range    Activated Clotting Time  356 (H) 82 - 152 Seconds   POC Activated Clotting Time   Result Value Ref Range    Activated Clotting Time  186 (H) 82 - 152 Seconds   POC Activated Clotting Time   Result Value Ref Range    Activated Clotting Time  164 (H) 82 - 152 Seconds   POC Activated Clotting Time   Result Value Ref Range    Activated Clotting Time  142 82 - 152 Seconds   POC Activated Clotting Time   Result Value Ref Range    Activated Clotting Time  136 82 - 152 Seconds           Diagnosis:   1. PAF (paroxysmal atrial fibrillation)  2. Mixed hyperlipidemia      Assessment & Plan:   1. Paroxysmal Afib, Aflutter s/p redo PVA in Sept 2017 with no recurrences noted. History of Aflutter RFA and previous original PVA. For now, continue on current therapy, including ASA and increase Metoprolol to 25 mg BID or TID depending on rates.    2. Follow up in 6 mths or sooner as  needed         CC: Manuel Lynch DO  05/14/18  11:24 AM      EMR Dragon/Transcription disclaimer:  Much of this encounter note is an electronic transcription/translation of spoken language to printed text. Electronic translation of spoken language may permit erroneous, or at times, nonsensical words or phrases to be inadvertently transcribed. Although I have reviewed the note for such errors, some may still exist.

## 2018-09-17 ENCOUNTER — TELEPHONE (OUTPATIENT)
Dept: CARDIOLOGY | Facility: CLINIC | Age: 63
End: 2018-09-17

## 2018-09-17 NOTE — TELEPHONE ENCOUNTER
Patient was on vacation last week and started feeling like his heart was skipping a lot. He said it did not feel like A-fib. He wanted to know if he could wear a heart monitor so you could see exactly what was going on?

## 2018-09-18 DIAGNOSIS — I48.0 PAROXYSMAL ATRIAL FIBRILLATION (HCC): Primary | ICD-10-CM

## 2018-11-19 ENCOUNTER — OFFICE VISIT (OUTPATIENT)
Dept: CARDIOLOGY | Facility: CLINIC | Age: 63
End: 2018-11-19

## 2018-11-19 VITALS
WEIGHT: 212 LBS | BODY MASS INDEX: 31.4 KG/M2 | HEIGHT: 69 IN | HEART RATE: 89 BPM | SYSTOLIC BLOOD PRESSURE: 128 MMHG | DIASTOLIC BLOOD PRESSURE: 76 MMHG

## 2018-11-19 DIAGNOSIS — I48.0 PAF (PAROXYSMAL ATRIAL FIBRILLATION) (HCC): Primary | ICD-10-CM

## 2018-11-19 PROCEDURE — 99213 OFFICE O/P EST LOW 20 MIN: CPT | Performed by: INTERNAL MEDICINE

## 2018-11-19 NOTE — PROGRESS NOTES
Subjective:   Mike Knapp Jr.  1955  [phone]  [phone]    05/14/2018    [facility] CARDIOLOGY    Manuel Cooper MD  [address] [place] [address] [address] [place] [address]    REFERRING DOCTOR: Manuel Cooper      Patient ID: Mike Knapp Jr. is a 62 y.o. male.    Chief Complaint:   Chief Complaint   Patient presents with   • PAF (paroxysmal atrial fibrillation) (CMS/[facility]ly [facility])     Problem List:      1. Chest pain:  a. Symptoms of twinges of chest pain occurring at rest x3 months.   b. Exercise GXT, Dr. Welch: Exercise duration 4 minutes and 23 seconds with no evidence of inducible ischemia.   c. Echocardiogram, 05/27/2014, Dr. Welch, revealing normal left ventricular ejection fraction (60%) with normal valvular structures.   2. Atrial flutter/atrial fibrillation:  a. Documentation by EKG, 08/25/2014.  b. Patient is status post EP study and typical flutter ablation on 10/17/2014.   c. On 04/19/2014, patient noted palpitations at home and went to the emergency department. On telemetry monitor at the outside hospital, patient was noted to be in type 1 atrial fibrillation with rates of about 140 beats per minute. There was no atrial flutter noted on the telemetry strip or telemetry EKGs at this hospitalization.  d. Patient was started on sotalol 80 mg b.i.d. and continued on his Xarelto 20 mg once a day for this new onset detection of atrial fibrillation, status post atrial flutter ablation. CHADS score = 0 with questionable history of hypertension.   e. Patient is currently only on aspirin due to the CHADS score of 0 and per patient’s request even after explanation of his CHADS score of possibly 1 with questionable hypertension and recurrent episode of atrial fibrillation as noted. The atrial fibrillation is very short-lived in nature.  f. Patient with continued episodes of short-lived atrial fibrillation even on the sotalol. Patient has stated that he has tried to taper off his  sotalol, and whenever he is decreased to 40 mg of the sotalol, he states he has had recurrence of his atrial fibrillation with RVR.  g. Patient states that he thinks the sotalol has been causing him symptoms of feeling drained and also a heavy pressure throughout his body is noted.   h. Patient’s sotalol has been discontinued, and he is currently maintained on flecainide 50 mg b.i.d. and metoprolol 12.5 mg b.i.d. He is currently on aspirin, since he has a CHADS score of 0 with no long episodes of atrial fibrillation at the present time.   i. Patient maintained currently on flecainide 50 mg b.i.d. and aspirin with pill-in-the-pocket Xarelto. Patient has a CHADS-VASc score of 0. Patient states his last episodes of atrial fibrillation were in July and September, which lasted about 1 to 1-1/2 hours.   j. Tikosyn 2/2017 with recurrent Afib and PACs  k. Jan 2017 Zio patch showed recurrent episodes of symptomatic atrial arrhythmias including atrial fibrillation.  l. S/P redo PVA with ablation along the roof of the RANDY with a roof and along fractioned signals on the posterior wall. 9/19/2017  3. Thrombocytopenia:  a. Platelet count in October 2015 was 217 and most recent 92.  4. Hyperlipidemia.   5. Generalized anxiety disorder.   6. History of Clemons's esophagus/gastroesophageal reflux disease.   7. Osteoarthritis.   8. Recent colitis in 2014.  9. Left ear tinnitus secondary to a viral illness.   10. Benign lung cyst.        Allergies   Allergen Reactions   • Sulfa Antibiotics Rash   • Statins Myalgia       Current Outpatient Medications:   •  aspirin 81 MG EC tablet, Take 81 mg by mouth Daily., Disp: , Rfl:   •  cholecalciferol (VITAMIN D3) 1000 UNITS tablet, Take 1,000 Units by mouth Every Morning., Disp: , Rfl:   •  diphenhydrAMINE (BENADRYL) 25 mg capsule, Take 12.5 mg by mouth Every 6 (Six) Hours As Needed for Itching., Disp: , Rfl:   •  Flaxseed, Linseed, (FLAXSEED OIL PO), Take 1,000 mg by mouth Every Morning.,  "Disp: , Rfl:   •  lansoprazole (PREVACID) 30 MG capsule, Take 30 mg by mouth Every Morning Before Breakfast., Disp: , Rfl:   •  Melatonin 3 MG capsule, Take 1.5 mg by mouth Every Night., Disp: , Rfl:   •  metoprolol tartrate (LOPRESSOR) 25 MG tablet, Take one tablet three times a day prn for atrial fibrillation episodes. (Patient taking differently: Take 50 mg by mouth 2 (Two) Times a Day. Take one tablet three times a day prn for atrial fibrillation episodes.), Disp: 90 tablet, Rfl: 11  •  vitamin B-12 (CYANOCOBALAMIN) 1000 MCG tablet, Take 1,000 mcg by mouth Every Morning., Disp: , Rfl:     History of Present Illness  62-year-old male with a history of paroxsymal symptomatic atrial fibrillation now status post redo pulmonary vein ablation in September 2017 here for follow-up visit.  He is currently off of his medications including blood thinners. Occ skipped beats but no Afib noted. When he was in Sept noted to have skipped beats that lasted hours but not exactly Afib itself. Was dealing with some medical stuff at that time as well in regards to ear issues and GI upset. Had a event monitor in Oct with PACs, PVCs and a short run of Atach 4 beats, No Afib noted. Feeling overall well however.     No issues with chest pain, shortness of breath, fevers, chills, night sweats, PND, orthopnea. No recent ER visits or hospital stays.      The following portions of the patient's history were reviewed and updated as appropriate: allergies, current medications, past family history, past medical history, past social history, past surgical history and problem list.    ROS   14 point ROS negative except as outlined in problem list, HPI and other parts of the note.    Procedures       Objective:       Vitals:    11/19/18 1326   BP: 128/76   BP Location: Right arm   Patient Position: Sitting   Pulse: 89   Weight: 96.2 kg (212 lb)   Height: 175.3 cm (69\")       GENERAL: Well-developed, well-nourished patient in no acute " distress.  HEENT: Normocephalic, atraumatic, PERRLA. Moist mucous membranes.  NECK: No JVD present at 30°. No carotid bruits auscultated.  LUNGS: Clear to auscultation.  CARDIOVASCULAR: Heart has a regular rate and rhythm. No murmurs, gallops or rubs noted.   ABDOMEN: Soft, nontender. Positive bowel sounds.  MUSCULOSKELETAL: No gross deformities. No clubbing, cyanosis, or lower extremity edema.  SKIN: Pink, warm  Neuro: Nonfocal exam. Gait intact  Ext: No edema or bruising    The patient's old records including ambulatory rhythm recordings (ECGs, Holter/event monitor) were reviewed and discussed.      Lab Review:   Results for orders placed or performed during the hospital encounter of 09/19/17   POC Activated Clotting Time   Result Value Ref Range    Activated Clotting Time  290 (H) 82 - 152 Seconds   POC Activated Clotting Time   Result Value Ref Range    Activated Clotting Time  356 (H) 82 - 152 Seconds   POC Activated Clotting Time   Result Value Ref Range    Activated Clotting Time  367 (H) 82 - 152 Seconds   POC Activated Clotting Time   Result Value Ref Range    Activated Clotting Time  356 (H) 82 - 152 Seconds   POC Activated Clotting Time   Result Value Ref Range    Activated Clotting Time  186 (H) 82 - 152 Seconds   POC Activated Clotting Time   Result Value Ref Range    Activated Clotting Time  164 (H) 82 - 152 Seconds   POC Activated Clotting Time   Result Value Ref Range    Activated Clotting Time  142 82 - 152 Seconds   POC Activated Clotting Time   Result Value Ref Range    Activated Clotting Time  136 82 - 152 Seconds           Diagnosis:   1. PAF (paroxysmal atrial fibrillation)  2. Mixed hyperlipidemia      Assessment & Plan:   1. Paroxysmal Afib, Aflutter s/p redo PVA in Sept 2017 with no recurrences noted. History of Aflutter RFA and previous original PVA. For now, continue on current therapy, including ASA and metoprolol. Event monitor with PAC, PVCs and short run of Atach 4 beats. No Afib  noted. On metoprolol on tablet in Am, half in PM and half at bedtime. Decreased caffeine as able.   2. Follow up in 6 mths or sooner as needed with Dr Duarte per patients request         CC: Manuel Lynch,   11/19/18  2:06 PM

## 2018-12-06 ENCOUNTER — TELEPHONE (OUTPATIENT)
Dept: GASTROENTEROLOGY | Facility: CLINIC | Age: 63
End: 2018-12-06

## 2018-12-06 RX ORDER — LANSOPRAZOLE 30 MG/1
30 CAPSULE, DELAYED RELEASE ORAL
Qty: 90 CAPSULE | Refills: 3 | Status: SHIPPED | OUTPATIENT
Start: 2018-12-06 | End: 2019-12-16 | Stop reason: SDUPTHER

## 2018-12-06 NOTE — TELEPHONE ENCOUNTER
HE CALLED TO GET HIS GENERIC PRECACID REFILLED TO [facility], [facility] / HE SAID HE WASN'T SURE IF YOU HAD ALREADY DONE IT BUT THE PHARM SAID IT NEEDED IT   DR COHN PT  MESSAGE FOR DENISHA

## 2019-12-16 RX ORDER — LANSOPRAZOLE 30 MG/1
30 CAPSULE, DELAYED RELEASE ORAL
Qty: 90 CAPSULE | Refills: 1 | Status: SHIPPED | OUTPATIENT
Start: 2019-12-16 | End: 2020-07-10

## 2019-12-16 NOTE — TELEPHONE ENCOUNTER
Received fax from [facility] in [place] that the patient is needing a refill on lansoprazole 30mg.  RX sent to pharmacy and patient will need an office appointment for additional refills.

## 2020-06-17 ENCOUNTER — OFFICE VISIT (OUTPATIENT)
Dept: CARDIOLOGY | Facility: CLINIC | Age: 65
End: 2020-06-17

## 2020-06-17 VITALS
HEART RATE: 80 BPM | HEIGHT: 70 IN | WEIGHT: 213.4 LBS | OXYGEN SATURATION: 99 % | SYSTOLIC BLOOD PRESSURE: 138 MMHG | BODY MASS INDEX: 30.55 KG/M2 | TEMPERATURE: 96.4 F | DIASTOLIC BLOOD PRESSURE: 92 MMHG

## 2020-06-17 DIAGNOSIS — I48.0 PAF (PAROXYSMAL ATRIAL FIBRILLATION) (HCC): Primary | ICD-10-CM

## 2020-06-17 DIAGNOSIS — I48.3 TYPICAL ATRIAL FLUTTER (HCC): ICD-10-CM

## 2020-06-17 PROCEDURE — 99213 OFFICE O/P EST LOW 20 MIN: CPT | Performed by: INTERNAL MEDICINE

## 2020-06-17 PROCEDURE — 93000 ELECTROCARDIOGRAM COMPLETE: CPT | Performed by: INTERNAL MEDICINE

## 2020-06-17 RX ORDER — IBUPROFEN 400 MG/1
400 TABLET ORAL EVERY 6 HOURS PRN
COMMUNITY

## 2020-06-17 RX ORDER — CHLORAL HYDRATE 500 MG
1000 CAPSULE ORAL
COMMUNITY
End: 2020-07-10 | Stop reason: DRUGHIGH

## 2020-06-17 RX ORDER — LORATADINE 10 MG/1
10 CAPSULE, LIQUID FILLED ORAL DAILY PRN
COMMUNITY

## 2020-06-17 RX ORDER — PROMETHAZINE HYDROCHLORIDE 25 MG/1
25 TABLET ORAL EVERY 6 HOURS PRN
COMMUNITY

## 2020-06-17 NOTE — PROGRESS NOTES
Mike Knapp Jr.  1955    [phone] (work)      06/17/2020    [facility] CARDIOLOGY     Manuel Cooper MD  [address] [place] [address] [place] [address]    Chief Complaint   Patient presents with   • Atrial Fibrillation       Problem List:   1. Chest pain:  a. Symptoms of twinges of chest pain occurring at rest x3 months.   b. Exercise GXT, Dr. Welch: Exercise duration 4 minutes and 23 seconds with no evidence of inducible ischemia.   c. Echocardiogram, 05/27/2014, Dr. Welch, revealing normal left ventricular ejection fraction (60%) with normal valvular structures.   2. Atrial flutter/atrial fibrillation:  a. Documentation by EKG, 08/25/2014.  b. Patient is status post EP study and typical flutter ablation on 10/17/2014.   c. On 04/19/2014, patient noted palpitations at home and went to the emergency department. On telemetry monitor at the outside hospital, patient was noted to be in type 1 atrial fibrillation with rates of about 140 beats per minute. There was no atrial flutter noted on the telemetry strip or telemetry EKGs at this hospitalization.  d. Patient was started on sotalol 80 mg b.i.d. and continued on his Xarelto 20 mg once a day for this new onset detection of atrial fibrillation, status post atrial flutter ablation. CHADS score = 0 with questionable history of hypertension.   e. Patient is currently only on aspirin due to the CHADS score of 0 and per patient’s request even after explanation of his CHADS score of possibly 1 with questionable hypertension and recurrent episode of atrial fibrillation as noted. The atrial fibrillation is very short-lived in nature.  f. Patient with continued episodes of short-lived atrial fibrillation even on the sotalol. Patient has stated that he has tried to taper off his sotalol, and whenever he is decreased to 40 mg of the sotalol, he states he has had recurrence of his atrial fibrillation with RVR.  g. Patient states that he  thinks the sotalol has been causing him symptoms of feeling drained and also a heavy pressure throughout his body is noted.   h. Patient’s sotalol has been discontinued, and he is currently maintained on flecainide 50 mg b.i.d. and metoprolol 12.5 mg b.i.d. He is currently on aspirin, since he has a CHADS score of 0 with no long episodes of atrial fibrillation at the present time.   i. Patient maintained currently on flecainide 50 mg b.i.d. and aspirin with pill-in-the-pocket Xarelto. Patient has a CHADS-VASc score of 0. Patient states his last episodes of atrial fibrillation were in July and September, which lasted about 1 to 1-1/2 hours.   j. Tikosyn 2/2017 with recurrent Afib and PACs  k. Jan 2017 Zio patch showed recurrent episodes of symptomatic atrial arrhythmias including atrial fibrillation.  l. S/P redo PVA with ablation along the roof of the RANDY with a roof and along fractioned signals on the posterior wall. 9/19/2017  3. Thrombocytopenia:  a. Platelet count in October 2015 was 217 and most recent 92.  4. Hyperlipidemia.   5. Generalized anxiety disorder.   6. History of Clemons's esophagus/gastroesophageal reflux disease.   7. Osteoarthritis.   8. Recent colitis in 2014.  9. Left ear tinnitus secondary to a viral illness.   10. Benign lung cyst.     Allergies  Allergies   Allergen Reactions   • Sulfa Antibiotics Rash   • Statins Myalgia       Current Medications    Current Outpatient Medications:   •  aspirin 81 MG EC tablet, Take 81 mg by mouth Daily., Disp: , Rfl:   •  cholecalciferol (VITAMIN D3) 1000 UNITS tablet, Take 1,000 Units by mouth Every Morning., Disp: , Rfl:   •  diphenhydrAMINE (BENADRYL) 25 mg capsule, Take 12.5 mg by mouth Every 6 (Six) Hours As Needed for Itching., Disp: , Rfl:   •  Flaxseed, Linseed, (FLAXSEED OIL PO), Take 1,000 mg by mouth Every Morning., Disp: , Rfl:   •  ibuprofen (ADVIL,MOTRIN) 400 MG tablet, Take 400 mg by mouth Every 6 (Six) Hours As Needed for Mild Pain ., Disp:  ", Rfl:   •  lansoprazole (PREVACID) 30 MG capsule, Take 1 capsule by mouth Every Morning Before Breakfast., Disp: 90 capsule, Rfl: 1  •  Loratadine 10 MG capsule, Take 10 mg by mouth Daily., Disp: , Rfl:   •  Melatonin 3 MG capsule, Take 1.5 mg by mouth Every Night., Disp: , Rfl:   •  metoprolol tartrate (LOPRESSOR) 25 MG tablet, Take 2 tablets by mouth 2 (Two) Times a Day., Disp: 90 tablet, Rfl: 11  •  Omega-3 Fatty Acids (FISH OIL) 1000 MG capsule capsule, Take 1,000 mg by mouth Daily With Breakfast., Disp: , Rfl:   •  promethazine (PHENERGAN) 25 MG tablet, Take 25 mg by mouth Every 6 (Six) Hours As Needed for Nausea or Vomiting., Disp: , Rfl:   •  vitamin B-12 (CYANOCOBALAMIN) 1000 MCG tablet, Take 1,000 mcg by mouth Every Morning., Disp: , Rfl:     History of Present Illness   HPI    Pt presents for follow up of atrial fibrillation. He is a former patient of Dr. Lynch.  Since he was last seen in 2018, he denies any AF episodes, SOB, CP, LH, or dizziness.  He only notes rare palpitations that only last seconds.  He does not think he has had any atrial fibrillation since his redo ablation.  Taking his ASA daily.  Denies any hospitalizations, ER visits, bleeding, or TIA/CVA symptoms. Overall feels well.  Does not routinely check BP at home.      ROS:  General:  Denies fatigue, weight gain or loss  Cardiovascular:  Denies CP, PND, syncope, near syncope, edema + palpitations.  Pulmonary:  Denies WRIGHT, cough, or wheezing      Vitals:    06/17/20 0949   BP: 138/92   BP Location: Right arm   Patient Position: Sitting   Pulse: 80   Temp: 96.4 °F (35.8 °C)   SpO2: 99%   Weight: 96.8 kg (213 lb 6.4 oz)   Height: 176.5 cm (69.5\")     PE:  General: NAD  Neck: no JVD, no carotid bruits, no TM  Heart RRR, NL S1, S2, no rubs, murmurs  Lungs: CTA, no wheezes, rhonchi, or rales  Abd: soft, non-tender, NL BS  Ext: No musculoskeletal deformities, no edema, cyanosis, or clubbing  Psych: normal mood and affect    Diagnostic " Data:        ECG 12 Lead  Date/Time: 6/17/2020 10:07 AM  Performed by: Tal Duarte MD  Authorized by: Tal Duarte MD   Comparison: compared with previous ECG from 5/14/2018  Similar to previous ECG  Rhythm: sinus rhythm  BPM: 80              1. PAF (paroxysmal atrial fibrillation) (CMS/HCC)    2. Typical atrial flutter (CMS/[facility])          Plan:  1) Paroxysmal atrial fibrillation:  - Patient with hx of atrial fibrillation with redo PVA with Dr. Lynch in 2017.  Previously failed Sotalol, Flecainide, and Tikosyn.  - Continue present medications.   2) Typical atrial flutter:  - S/p RFA.  No documented recurrence.  3) Anticoagulation:  - CHADSVASc = 1 (age), on ASA    F/up in 12 months    Scribed for Tal Duarte MD by JASVIR Mena. 6/17/2020  09:57     I, Tal Duarte MD, personally performed the services described in this documentation as scribed by the above named individual in my presence, and it is both accurate and complete.  6/17/2020  10:21

## 2020-07-10 ENCOUNTER — OFFICE VISIT (OUTPATIENT)
Dept: GASTROENTEROLOGY | Facility: CLINIC | Age: 65
End: 2020-07-10

## 2020-07-10 VITALS
SYSTOLIC BLOOD PRESSURE: 155 MMHG | TEMPERATURE: 97.3 F | WEIGHT: 213.6 LBS | BODY MASS INDEX: 31.09 KG/M2 | DIASTOLIC BLOOD PRESSURE: 89 MMHG | HEART RATE: 83 BPM

## 2020-07-10 DIAGNOSIS — R19.7 DIARRHEA, UNSPECIFIED TYPE: Primary | ICD-10-CM

## 2020-07-10 DIAGNOSIS — K22.70 BARRETT'S ESOPHAGUS WITHOUT DYSPLASIA: ICD-10-CM

## 2020-07-10 PROCEDURE — 99204 OFFICE O/P NEW MOD 45 MIN: CPT | Performed by: NURSE PRACTITIONER

## 2020-07-10 RX ORDER — CHOLECALCIFEROL (VITAMIN D3) 125 MCG
5 CAPSULE ORAL NIGHTLY
COMMUNITY

## 2020-07-10 NOTE — PROGRESS NOTES
GASTROENTEROLOGY OFFICE NOTE  Mike Knapp Jr.  [id number]  1955    CARE TEAM  Patient Care Team:  Manuel Cooper MD as PCP - General (Internal Medicine)    Referring Provider: Manuel Cooper MD    Chief Complaint   Patient presents with   • GI Problem     Change in bowel movement        HISTORY OF PRESENT ILLNESS:  Mr. Knapp is a 64-year-old male with known history of Clemons's esophagus, last EGD in November 2017 that showed Clemons's esophagus without dysplasia.  He is taking Prevacid 30 mg daily with good control of his acid reflux.  He reports today that he has had a change in his bowel habits over the past 4 to 5 months described as having daily, urgent bowel movements every morning.  It starts as a firm bowel movement and then later has loose stools.  Typically, two episodes of loose stools every morning depending on if he has had breakfast.  He is having a lot of flatulence and reports that he can have loose bowels at any time.  He has concerns today that he has SIBO, Candida, vitamin/mineral/hormone deficiencies that may be related to his change in bowel habits.  He has been researching extensively the possibility of SIBO and would like to discuss taking a supplement that he has found in his research that may improve this versus use of Xifaxan.  He has started a water-soluble fiber that has been helping his bowel habits significantly.    His last colonoscopy was in 2019 with Dr. Quezada who follows him for his health maintenance regarding colonoscopies since having a partial colon resection secondary to an adenomatous colon polyp.    PAST MEDICAL HISTORY  Past Medical History:   Diagnosis Date   • AF (atrial fibrillation) (CMS/HCC)    • Atrial flutter (CMS/HCC) 8/25/2016    atrial fibrillation: Documentation by EKG, 08/25/2014. Patient is status post EP study and typical flutter ablation on 10/17/2014.   On 04/19/2014, patient noted palpitations at home and went to the emergency  "department.  On telemetry monitor at the outside hospital, patient was noted to be in type 1 atrial fibrillation with rates of about 140 beats per minute.  There was no atrial flutter noted o   • Barretts esophagus    • Chest pain    • Colitis 2014   • Colitis 2014   • Contact dermatitis due to ragweed     \"RAW THROAT\"   • CLAUDIA (generalized anxiety disorder)    • GERD (gastroesophageal reflux disease)     Clemons's esophagus H/O   • H/O atrial flutter    • Hyperlipidemia    • Lung cyst     benign   • Lung cyst     Benign    • Osteoarthritis    • Osteoarthritis    • PONV (postoperative nausea and vomiting)    • Sleeplessness    • Thrombocytopenia (CMS/HCC)    • Tinnitus     Left ear Secondary to a viral illness    • Tinnitus of left ear     secondary to viral illness        PAST SURGICAL HISTORY  Past Surgical History:   Procedure Laterality Date   • APPENDECTOMY     • CARDIAC ABLATION     • CARDIAC ELECTROPHYSIOLOGY PROCEDURE N/A 9/19/2017    Procedure: Ablation atrial fibrillation;  Surgeon: Adithya Lynch DO;  Location: [facility] INVASIVE LOCATION;  Service:    • CHOLECYSTECTOMY     • COLECTOMY PARTIAL / TOTAL     • COLONOSCOPY     • HERNIA REPAIR     • INGUINAL HERNIA REPAIR Left    • KNEE SURGERY  1960   • LASIK     • LASIK     • WISDOM TOOTH EXTRACTION          MEDICATIONS:    Current Outpatient Medications:   •  aspirin 81 MG EC tablet, Take 81 mg by mouth Daily., Disp: , Rfl:   •  cholecalciferol (VITAMIN D3) 1000 UNITS tablet, Take 1,000 Units by mouth Every Morning., Disp: , Rfl:   •  diphenhydrAMINE (BENADRYL) 25 mg capsule, Take 12.5 mg by mouth Every 6 (Six) Hours As Needed for Itching., Disp: , Rfl:   •  ibuprofen (ADVIL,MOTRIN) 400 MG tablet, Take 400 mg by mouth Every 6 (Six) Hours As Needed for Mild Pain ., Disp: , Rfl:   •  lansoprazole (PREVACID) 30 MG capsule, Take 1 capsule by mouth Every Morning Before Breakfast., Disp: 90 capsule, Rfl: 1  •  Loratadine 10 MG capsule, Take 10 mg by mouth Daily. " PRN, Disp: , Rfl:   •  melatonin 5 MG tablet tablet, Take 5 mg by mouth Every Night., Disp: , Rfl:   •  Methylcellulose, Laxative, (SOLUBLE FIBER PO), Take 1 teaspoon(s) by mouth Daily., Disp: , Rfl:   •  metoprolol tartrate (LOPRESSOR) 25 MG tablet, Take 2 tablets by mouth 2 (Two) Times a Day., Disp: 90 tablet, Rfl: 11  •  Omega-3 Fatty Acids (EQL OMEGA 3 FISH OIL) 1400 MG capsule, Take 2 capsules by mouth Daily., Disp: , Rfl:   •  promethazine (PHENERGAN) 25 MG tablet, Take 25 mg by mouth Every 6 (Six) Hours As Needed for Nausea or Vomiting., Disp: , Rfl:   •  Psyllium (METAMUCIL PO), Take 1 teaspoon(s) by mouth Daily., Disp: , Rfl:   •  SACCHAROMYCES BOULARDII PO, Take 1 capsule by mouth. 1 every 3 days, Disp: , Rfl:   •  TURMERIC PO, Take  by mouth., Disp: , Rfl:   •  vitamin B-12 (CYANOCOBALAMIN) 1000 MCG tablet, Take 1,000 mcg by mouth Every Morning., Disp: , Rfl:   •  Flaxseed, Linseed, (FLAXSEED OIL PO), Take 1,000 mg by mouth Every Morning., Disp: , Rfl:     ALLERGIES  Allergies   Allergen Reactions   • Sulfa Antibiotics Rash   • Statins Myalgia       FAMILY HISTORY:  Family History   Problem Relation Age of Onset   • Arrhythmia Mother    • Hypertension Mother    • Diabetes Mother    • Heart disease Father    • Colon polyps Father    • Colon cancer Cousin        SOCIAL HISTORY  Social History     Socioeconomic History   • Marital status:      Spouse name: Not on file   • Number of children: Not on file   • Years of education: Not on file   • Highest education level: Not on file   Tobacco Use   • Smoking status: Never Smoker   • Smokeless tobacco: Never Used   Substance and Sexual Activity   • Alcohol use: No   • Drug use: No   • Sexual activity: Defer   Social History Narrative    Caffeine use: none.        REVIEW OF SYSTEMS  Review of Systems   Constitutional: Negative for activity change, appetite change, chills, diaphoresis, fatigue, fever, unexpected weight gain and unexpected weight loss.    HENT: Negative for congestion, dental problem, drooling, ear discharge, ear pain, facial swelling, hearing loss, mouth sores, nosebleeds, postnasal drip, rhinorrhea, sinus pressure, sneezing, sore throat, swollen glands, tinnitus, trouble swallowing and voice change.    Respiratory: Negative for apnea, cough, choking, chest tightness, shortness of breath, wheezing and stridor.    Cardiovascular: Negative for chest pain, palpitations and leg swelling.   Gastrointestinal: Positive for diarrhea. Negative for abdominal distention, abdominal pain, anal bleeding, blood in stool, constipation, nausea, rectal pain, vomiting, GERD and indigestion.        Flatulence   Endocrine: Negative for cold intolerance, heat intolerance, polydipsia, polyphagia and polyuria.   Musculoskeletal: Negative for arthralgias, back pain, gait problem, joint swelling, myalgias, neck pain, neck stiffness and bursitis.   Skin: Negative for color change, dry skin, rash and skin lesions.   Allergic/Immunologic: Negative for environmental allergies, food allergies and immunocompromised state.   Neurological: Negative for dizziness, tremors, seizures, syncope, facial asymmetry, speech difficulty, weakness, light-headedness, numbness, headache, memory problem and confusion.   Hematological: Negative for adenopathy. Does not bruise/bleed easily.   Psychiatric/Behavioral: Negative for agitation, behavioral problems, decreased concentration, dysphoric mood, hallucinations, self-injury, sleep disturbance, suicidal ideas, negative for hyperactivity, depressed mood and stress. The patient is not nervous/anxious.        PHYSICAL EXAM   /89 (BP Location: Left arm, Patient Position: Sitting, Cuff Size: Adult)   Pulse 83   Temp 97.3 °F (36.3 °C) (Temporal)   Wt 96.9 kg (213 lb 9.6 oz)   BMI 31.09 kg/m²   Physical Exam   Constitutional: He is oriented to person, place, and time. He appears well-developed and well-nourished.   HENT:   Head:  Normocephalic.   Mouth/Throat: Oropharynx is clear and moist.   Eyes: Pupils are equal, round, and reactive to light. EOM are normal.   Neck: Normal range of motion. Neck supple.   Cardiovascular: Normal rate and regular rhythm.   Pulmonary/Chest: Effort normal and breath sounds normal. He has no wheezes. He has no rales.   Abdominal: Soft. Bowel sounds are normal. He exhibits no mass. There is no tenderness. There is no rebound and no guarding. No hernia.   Musculoskeletal: Normal range of motion.   Neurological: He is alert and oriented to person, place, and time. No cranial nerve deficit.   Skin: Skin is warm and dry.   Psychiatric: He has a normal mood and affect. His behavior is normal. Judgment normal.   Nursing note and vitals reviewed.    Results Review:  [facility] records reviewed and discussed with patient.      DISCUSSION:  We have had a lengthy discussion today about SIBO testing, unfortunately this is not testing that is readily available but is acceptable to treat empirically with Xifaxan.  Dietary modifications for IBS were also discussed.  He has my input on all of the supplemental products he is found to treat SIBO.  Unfortunately, because there is not FDA approval on the supplements I do not have much to offer for him by way of recommendation in using supplements.  However, we have looked in detail at a 4-week regimen with a nutritional supplement that he has brought the ingredients today.  Given the short-term treatment regimen it seems acceptable to trial.      ASSESSMENT / PLAN  1.  Change in bowel habits-diarrhea  -The patient has chosen to trial the 4-week supplement as discussed and see if he can have any improvement in his bowel habits.  2.  Clemons's esophagus without dysplasia  -EGD for surveillance due in November 2020    Return in about 6 weeks (around 8/21/2020).    I discussed the patients findings and my recommendations with patient    JASVIR Sánchez

## 2020-07-13 RX ORDER — LANSOPRAZOLE 30 MG/1
30 CAPSULE, DELAYED RELEASE ORAL
Qty: 90 CAPSULE | Refills: 3 | Status: SHIPPED | OUTPATIENT
Start: 2020-07-13 | End: 2021-06-23

## 2020-08-21 ENCOUNTER — OFFICE VISIT (OUTPATIENT)
Dept: GASTROENTEROLOGY | Facility: CLINIC | Age: 65
End: 2020-08-21

## 2020-08-21 VITALS
HEIGHT: 69 IN | BODY MASS INDEX: 31.55 KG/M2 | HEART RATE: 72 BPM | SYSTOLIC BLOOD PRESSURE: 135 MMHG | DIASTOLIC BLOOD PRESSURE: 85 MMHG | TEMPERATURE: 97.3 F | WEIGHT: 213 LBS

## 2020-08-21 DIAGNOSIS — K59.1 FUNCTIONAL DIARRHEA: Primary | ICD-10-CM

## 2020-08-21 DIAGNOSIS — K21.9 GASTROESOPHAGEAL REFLUX DISEASE, ESOPHAGITIS PRESENCE NOT SPECIFIED: ICD-10-CM

## 2020-08-21 DIAGNOSIS — K22.70 BARRETT'S ESOPHAGUS WITHOUT DYSPLASIA: ICD-10-CM

## 2020-08-21 PROCEDURE — 99213 OFFICE O/P EST LOW 20 MIN: CPT | Performed by: NURSE PRACTITIONER

## 2020-08-21 NOTE — PROGRESS NOTES
GASTROENTEROLOGY OFFICE NOTE  Mike Knapp Jr.  [id number]  1955    CARE TEAM  Patient Care Team:  Manuel Cooper MD as PCP - General (Internal Medicine)    Referring Provider: Manuel Cooper MD    Chief Complaint   Patient presents with   • Diarrhea     F/U        HISTORY OF PRESENT ILLNESS:  Mr. Knapp returns in follow-up with diarrhea.  He has had a change in his bowel habits over the past 6 months or so described as having a daily, urgent bowel movement every morning after breakfast and then typically 2 episodes of loose stools every morning depending on what he has had for breakfast.  Prior to his last visit he had started taking a water-soluble fiber supplement that had improved his bowel habits significantly.  He expressed interest in being tested for SIBO, unfortunately that testing is not readily available.  The option of treating his complaints of diarrhea with Xifaxan was recommended and discussed.  Patient decided he wanted to try a nutritional supplement and see if this would help his diarrhea.  He has since done so and reports that he is doing better, he is having a soft formed bowel movement daily but he just completed his supplement about a week ago.  He has had one episode of diarrhea since completing the supplement, this was directly related to something he had eaten.  Overall he is doing well with his bowel habits at this time.    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  He is currently taking Prevacid 30 mg daily which controls his acid reflux well.  He has attempted in the past to decrease the dose of Prevacid to 15 mg daily but he did not tolerate this well, did not control his acid reflux.  His last EGD for Clemons's surveillance was in November 2017, he is due for surveillance again shortly.      PAST MEDICAL HISTORY  Past Medical History:   Diagnosis Date   • AF (atrial fibrillation) (CMS/[facility])    • Atrial flutter (CMS/[facility]) 8/25/2016  "   atrial fibrillation: Documentation by EKG, 08/25/2014. Patient is status post EP study and typical flutter ablation on 10/17/2014.   On 04/19/2014, patient noted palpitations at home and went to the emergency department.  On telemetry monitor at the outside hospital, patient was noted to be in type 1 atrial fibrillation with rates of about 140 beats per minute.  There was no atrial flutter noted o   • Barretts esophagus    • Chest pain    • Colitis 2014   • Colitis 2014   • Contact dermatitis due to ragweed     \"RAW THROAT\"   • CLAUDIA (generalized anxiety disorder)    • GERD (gastroesophageal reflux disease)     Clemons's esophagus H/O   • H/O atrial flutter    • Hyperlipidemia    • Lung cyst     benign   • Lung cyst     Benign    • Osteoarthritis    • Osteoarthritis    • PONV (postoperative nausea and vomiting)    • Sleeplessness    • Thrombocytopenia (CMS/HCC)    • Tinnitus     Left ear Secondary to a viral illness    • Tinnitus of left ear     secondary to viral illness        PAST SURGICAL HISTORY  Past Surgical History:   Procedure Laterality Date   • APPENDECTOMY     • CARDIAC ABLATION     • CARDIAC ELECTROPHYSIOLOGY PROCEDURE N/A 9/19/2017    Procedure: Ablation atrial fibrillation;  Surgeon: Adithya Lynch DO;  Location: [facility] INVASIVE LOCATION;  Service:    • CHOLECYSTECTOMY     • COLECTOMY PARTIAL / TOTAL     • COLONOSCOPY     • HERNIA REPAIR     • INGUINAL HERNIA REPAIR Left    • KNEE SURGERY  1960   • LASIK     • LASIK     • WISDOM TOOTH EXTRACTION          MEDICATIONS:    Current Outpatient Medications:   •  aspirin 81 MG EC tablet, Take 81 mg by mouth Daily., Disp: , Rfl:   •  cholecalciferol (VITAMIN D3) 1000 UNITS tablet, Take 1,000 Units by mouth Every Morning., Disp: , Rfl:   •  diphenhydrAMINE (BENADRYL) 25 mg capsule, Take 12.5 mg by mouth Every 6 (Six) Hours As Needed for Itching., Disp: , Rfl:   •  Flaxseed, Linseed, (FLAXSEED OIL PO), Take 1,000 mg by mouth Every Morning., Disp: , Rfl: "   •  ibuprofen (ADVIL,MOTRIN) 400 MG tablet, Take 400 mg by mouth Every 6 (Six) Hours As Needed for Mild Pain ., Disp: , Rfl:   •  lansoprazole (PREVACID) 30 MG capsule, TAKE 1 CAPSULE BY MOUTH EVERY MORNING BEFORE BREAKFAST, Disp: 90 capsule, Rfl: 3  •  Loratadine 10 MG capsule, Take 10 mg by mouth Daily. PRN, Disp: , Rfl:   •  melatonin 5 MG tablet tablet, Take 5 mg by mouth Every Night., Disp: , Rfl:   •  Methylcellulose, Laxative, (SOLUBLE FIBER PO), Take 1 teaspoon(s) by mouth Daily., Disp: , Rfl:   •  metoprolol tartrate (LOPRESSOR) 25 MG tablet, Take 2 tablets by mouth 2 (Two) Times a Day., Disp: 90 tablet, Rfl: 11  •  Omega-3 Fatty Acids (EQL OMEGA 3 FISH OIL) 1400 MG capsule, Take 2 capsules by mouth Daily., Disp: , Rfl:   •  promethazine (PHENERGAN) 25 MG tablet, Take 25 mg by mouth Every 6 (Six) Hours As Needed for Nausea or Vomiting., Disp: , Rfl:   •  Psyllium (METAMUCIL PO), Take 1 teaspoon(s) by mouth Daily., Disp: , Rfl:   •  SACCHAROMYCES BOULARDII PO, Take 1 capsule by mouth. 1 every 3 days, Disp: , Rfl:   •  TURMERIC PO, Take  by mouth., Disp: , Rfl:   •  vitamin B-12 (CYANOCOBALAMIN) 1000 MCG tablet, Take 1,000 mcg by mouth Every Morning., Disp: , Rfl:     ALLERGIES  Allergies   Allergen Reactions   • Sulfa Antibiotics Rash   • Statins Myalgia       FAMILY HISTORY:  Family History   Problem Relation Age of Onset   • Arrhythmia Mother    • Hypertension Mother    • Diabetes Mother    • Heart disease Father    • Colon polyps Father    • Colon cancer Cousin        SOCIAL HISTORY  Social History     Socioeconomic History   • Marital status:      Spouse name: Not on file   • Number of children: Not on file   • Years of education: Not on file   • Highest education level: Not on file   Tobacco Use   • Smoking status: Never Smoker   • Smokeless tobacco: Never Used   Substance and Sexual Activity   • Alcohol use: No   • Drug use: No   • Sexual activity: Defer   Social History Narrative    Caffeine  "use: none.        REVIEW OF SYSTEMS  Review of Systems   Constitutional: Negative for activity change, appetite change, chills, diaphoresis, fatigue, fever, unexpected weight gain and unexpected weight loss.   HENT: Negative for trouble swallowing and voice change.    Gastrointestinal: Negative for abdominal distention, abdominal pain, anal bleeding, blood in stool, constipation, diarrhea, nausea, rectal pain, vomiting, GERD and indigestion.     PHYSICAL EXAM   /85 (BP Location: Right arm, Patient Position: Sitting, Cuff Size: Adult)   Pulse 72   Temp 97.3 °F (36.3 °C) (Temporal)   Ht 176.5 cm (69.49\")   Wt 96.6 kg (213 lb)   BMI 31.01 kg/m²   Physical Exam   Constitutional: He is oriented to person, place, and time. He appears well-developed and well-nourished.   HENT:   Head: Normocephalic.   Mouth/Throat: Oropharynx is clear and moist.   Eyes: Pupils are equal, round, and reactive to light. EOM are normal.   Neck: Normal range of motion. Neck supple.   Cardiovascular: Normal rate and regular rhythm.   Pulmonary/Chest: Effort normal and breath sounds normal. He has no wheezes. He has no rales.   Abdominal: Soft. Bowel sounds are normal. He exhibits no mass. There is no tenderness. There is no rebound and no guarding. No hernia.   Musculoskeletal: Normal range of motion.   Neurological: He is alert and oriented to person, place, and time. No cranial nerve deficit.   Skin: Skin is warm and dry.   Psychiatric: He has a normal mood and affect. His behavior is normal. Judgment normal.   Nursing note and vitals reviewed.        Results Review:  [facility] records reviewed and discussed with patient.     ASSESSMENT / PLAN  1.  Diarrhea  -Currently resolved  2.  Clemons's esophagus  -EGD for surveillance  3.  GERD  -Continue Prevacid 30 mg daily    Return if symptoms worsen or fail to improve.    I discussed the patients findings and my recommendations with patient    JASVIR Sánchez                    "

## 2020-08-24 ENCOUNTER — APPOINTMENT (OUTPATIENT)
Dept: PREADMISSION TESTING | Facility: HOSPITAL | Age: 65
End: 2020-08-24

## 2020-08-24 LAB
REF LAB TEST METHOD: NORMAL
SARS-COV-2 RNA RESP QL NAA+PROBE: NOT DETECTED

## 2020-08-24 PROCEDURE — U0002 COVID-19 LAB TEST NON-CDC: HCPCS

## 2020-08-24 PROCEDURE — U0004 COV-19 TEST NON-CDC HGH THRU: HCPCS

## 2020-08-24 PROCEDURE — C9803 HOPD COVID-19 SPEC COLLECT: HCPCS

## 2020-08-26 ENCOUNTER — OUTSIDE FACILITY SERVICE (OUTPATIENT)
Dept: GASTROENTEROLOGY | Facility: CLINIC | Age: 65
End: 2020-08-26

## 2020-08-26 PROCEDURE — 43239 EGD BIOPSY SINGLE/MULTIPLE: CPT | Performed by: INTERNAL MEDICINE

## 2020-08-26 PROCEDURE — 88305 TISSUE EXAM BY PATHOLOGIST: CPT | Performed by: INTERNAL MEDICINE

## 2020-08-27 ENCOUNTER — LAB REQUISITION (OUTPATIENT)
Dept: LAB | Facility: HOSPITAL | Age: 65
End: 2020-08-27

## 2020-08-27 DIAGNOSIS — K22.70 BARRETT'S ESOPHAGUS WITHOUT DYSPLASIA: ICD-10-CM

## 2020-08-28 LAB
CYTO UR: NORMAL
LAB AP CASE REPORT: NORMAL
LAB AP CLINICAL INFORMATION: NORMAL
PATH REPORT.FINAL DX SPEC: NORMAL
PATH REPORT.GROSS SPEC: NORMAL

## 2020-10-16 ENCOUNTER — TELEPHONE (OUTPATIENT)
Dept: CARDIOLOGY | Facility: CLINIC | Age: 65
End: 2020-10-16

## 2020-10-21 ENCOUNTER — TELEPHONE (OUTPATIENT)
Dept: CARDIOLOGY | Facility: CLINIC | Age: 65
End: 2020-10-21

## 2020-10-21 DIAGNOSIS — R00.2 PALPITATIONS: Primary | ICD-10-CM

## 2020-10-21 NOTE — TELEPHONE ENCOUNTER
"Pt wanted to inform you that as of Monday he has felt \"sluggish/off\" his SBP was 130. Tuesday am after a BM he felt very nauseated and took a phenergan and rested all day until his appt with his GI. He mentioned it to his GI dr and he said that he needed to make you aware. He reports also that he thinks he may have indigestion that he has been putting off as gas. Last stress test was in 2017.  "

## 2020-11-16 ENCOUNTER — TELEPHONE (OUTPATIENT)
Dept: CARDIOLOGY | Facility: CLINIC | Age: 65
End: 2020-11-16

## 2020-11-16 DIAGNOSIS — I48.0 PAROXYSMAL ATRIAL FIBRILLATION (HCC): Primary | ICD-10-CM

## 2020-11-16 NOTE — TELEPHONE ENCOUNTER
PT had minor pain in his chest area around his L ribs on Saturday. He went to the ER where he was told it was not a MI. They told him to call his cardiologist today. Pt is wanting to know if you will refer him to one of our Cardiologist. He states that he is not having afib nor PVC's and feels that this is a plumbing issue.

## 2020-11-17 NOTE — TELEPHONE ENCOUNTER
PT is aware. Appt made for Dr. Lei at 11/30/2020 at 0900. PT will check with [facility] to see if he can get his echo done there sooner.

## 2020-11-24 NOTE — PROGRESS NOTES
[facility]  [address], [address]  [place], [place], [address]    [phone]  [email or web address]           OUTPATIENT CLINIC CONSULTATION NOTE    Patient care team:  Patient Care Team:  Justo Cervantes MD as PCP - General (Internal Medicine)    Requesting Provider and Reason for consultation: The patient is being seen today at the request of Tal Duarte MD for Chest Pain.     Subjective:   Chief complaint:   Chief Complaint   Patient presents with   • PAF (paroxysmal atrial fibrillation) (CMS/HCC)       HPI:    Mike Knapp Jr. is a 64 y.o. male.  Partial problem list, including cardiac problems:  1. Chest pain:  a. Symptoms of twinges of chest pain occurring at rest x3 months.   b. Exercise GXT, Dr. Welch: Exercise duration 4 minutes and 23 seconds with no evidence of inducible ischemia.   c. Echocardiogram, 05/27/2014, Dr. Welch, revealing normal left ventricular ejection fraction (60%) with normal valvular structures.   2. Atrial flutter/atrial fibrillation:  a. Documentation by EKG, 08/25/2014.  b. Patient is status post EP study and typical flutter ablation on 10/17/2014.   c. On 04/19/2014, patient noted palpitations at home and went to the emergency department. On telemetry monitor at the outside hospital, patient was noted to be in type 1 atrial fibrillation with rates of about 140 beats per minute. There was no atrial flutter noted on the telemetry strip or telemetry EKGs at this hospitalization.  d. Patient was started on sotalol 80 mg b.i.d. and continued on his Xarelto 20 mg once a day for this new onset detection of atrial fibrillation, status post atrial flutter ablation. CHADS score = 0 with questionable history of hypertension.   e. Patient is currently only on aspirin due to the CHADS score of 0 and per patient’s request even after explanation of his CHADS score of possibly 1 with questionable hypertension and recurrent episode of  atrial fibrillation as noted. The atrial fibrillation is very short-lived in nature.  f. Patient with continued episodes of short-lived atrial fibrillation even on the sotalol. Patient has stated that he has tried to taper off his sotalol, and whenever he is decreased to 40 mg of the sotalol, he states he has had recurrence of his atrial fibrillation with RVR.  g. Patient states that he thinks the sotalol has been causing him symptoms of feeling drained and also a heavy pressure throughout his body is noted.   h. Patient’s sotalol has been discontinued, and he is currently maintained on flecainide 50 mg b.i.d. and metoprolol 12.5 mg b.i.d. He is currently on aspirin, since he has a CHADS score of 0 with no long episodes of atrial fibrillation at the present time.   i. Patient maintained currently on flecainide 50 mg b.i.d. and aspirin with pill-in-the-pocket Xarelto. Patient has a CHADS-VASc score of 0. Patient states his last episodes of atrial fibrillation were in July and September, which lasted about 1 to 1-1/2 hours.   j. Tikosyn 2/2017 with recurrent Afib and PACs  k. Jan 2017 Zio patch showed recurrent episodes of symptomatic atrial arrhythmias including atrial fibrillation.  l. S/P redo PVA with ablation along the roof of the RANDY with a roof and along fractioned signals on the posterior wall. 9/19/2017  3. Thrombocytopenia:  a. Platelet count in October 2015 was 217 and most recent 92.  4. Hyperlipidemia.   5. Generalized anxiety disorder.   6. History of Clemons's esophagus/gastroesophageal reflux disease.   7. Osteoarthritis.   8. Recent colitis in 2014.  9. Left ear tinnitus secondary to a viral illness.   10. Benign lung cyst.     He presents today for consultation.    He reports that for the past several weeks he has had intermittent, mild to moderate, brief chest pain.  This has been typically centrally located.  His chest pain also occurs with or without exertion.  He was evaluated at [facility] a few weeks ago and was ruled out for MI.  He deviously saw Dr. Alvarado and notes his last stress testing was in 2014.  He denies dyspnea with exertion, lower extremity edema, lightheadedness, syncope, orthopnea, or PND.  He reports he recently underwent EGD with Dr. Chavarria which revealed irritation.  This was not considered to be significant in his medications were not changed.    He reports that his father was initially diagnosed with CAD in his 40s.  He his mother had atrial fibrillation.  He denies tobacco, alcohol, or drug use.  He reports he is fairly sedentary working as a .    Review of Systems:  Positive for chest pain  All other systems reviewed and are negative.    PFSH:  Patient Active Problem List   Diagnosis   • Chest pain   • Typical atrial flutter (CMS/HCC)   • Thrombocytopenia (CMS/HCC)   • Hyperlipidemia   • Osteoarthritis   • PAF (paroxysmal atrial fibrillation) (CMS/HCC)         Current Outpatient Medications:   •  aspirin 81 MG EC tablet, Take 81 mg by mouth Daily., Disp: , Rfl:   •  cholecalciferol (VITAMIN D3) 1000 UNITS tablet, Take 1,000 Units by mouth Every Morning., Disp: , Rfl:   •  diphenhydrAMINE (BENADRYL) 25 mg capsule, Take 12.5 mg by mouth Every 6 (Six) Hours As Needed for Itching., Disp: , Rfl:   •  ibuprofen (ADVIL,MOTRIN) 400 MG tablet, Take 400 mg by mouth Every 6 (Six) Hours As Needed for Mild Pain ., Disp: , Rfl:   •  lansoprazole (PREVACID) 30 MG capsule, TAKE 1 CAPSULE BY MOUTH EVERY MORNING BEFORE BREAKFAST, Disp: 90 capsule, Rfl: 3  •  Loratadine 10 MG capsule, Take 10 mg by mouth Daily. PRN, Disp: , Rfl:   •  melatonin 5 MG tablet tablet, Take 5 mg by mouth Every Night., Disp: , Rfl:   •  Methylcellulose, Laxative, (SOLUBLE FIBER PO), Take 1 teaspoon(s) by mouth Daily., Disp: , Rfl:   •  metoprolol tartrate (LOPRESSOR) 25 MG tablet, Take 2 tablets by mouth 2 (Two) Times a Day., Disp: 90 tablet, Rfl: 11  •  Omega-3 Fatty Acids (EQL OMEGA 3 FISH OIL) 1400  "MG capsule, Take 2 capsules by mouth Daily., Disp: , Rfl:   •  promethazine (PHENERGAN) 25 MG tablet, Take 25 mg by mouth Every 6 (Six) Hours As Needed for Nausea or Vomiting., Disp: , Rfl:   •  Psyllium (METAMUCIL PO), Take 1 teaspoon(s) by mouth Daily., Disp: , Rfl:   •  TURMERIC PO, Take  by mouth., Disp: , Rfl:   •  vitamin B-12 (CYANOCOBALAMIN) 1000 MCG tablet, Take 1,000 mcg by mouth Every Morning., Disp: , Rfl:     Allergies   Allergen Reactions   • Sulfa Antibiotics Rash   • Statins Myalgia       Social History     Socioeconomic History   • Marital status:      Spouse name: Not on file   • Number of children: Not on file   • Years of education: Not on file   • Highest education level: Not on file   Tobacco Use   • Smoking status: Never Smoker   • Smokeless tobacco: Never Used   Substance and Sexual Activity   • Alcohol use: No   • Drug use: No   • Sexual activity: Defer   Social History Narrative    Caffeine use: none.      Family History   Problem Relation Age of Onset   • Arrhythmia Mother    • Hypertension Mother    • Diabetes Mother    • Heart disease Father    • Colon polyps Father    • Colon cancer Cousin          Objective:   Physical Exam:  /78 (BP Location: Right arm, Patient Position: Sitting)   Pulse 76   Ht 176.5 cm (69.49\")   Wt 97.1 kg (214 lb)   BMI 31.16 kg/m²   CONSTITUTIONAL: Well-nourished. In no acute distress.   SKIN: Warm and dry. No rashes noted  HEENT: Head is normocephalic and atraumatic. Pupils are equal and reactive to light bilaterally. Mucous membranes are pink and moist.   NECK: Supple without masses or thyromegaly. There is no jugular venous distention   LUNGS: Normal effort. Clear to auscultation bilaterally without wheezing, rhonchi, or rales noted.   CARDIOVASCULAR: Regular rate and rhythm with a normal S1 and S2. There is no murmur, gallop, rub, or click appreciated. Carotid upstrokes are 2+ and symmetrical without bruits.  2+ radial pulses bilaterally.  " There is no peripheral edema.  Steri tibial pulses are 2+ bilaterally.  ABDOMEN: Normal bowel sounds.  Soft and nondistended. No tenderness with palpitation.   MUSCULOSKELETAL:  No digital cyanosis  NEUROLOGICAL: Nonfocal.  PSYCHIATRIC: Alert, orientated x 3, appropriate affect     Labs:  BUN   Date Value Ref Range Status   09/18/2017 13 9 - 23 mg/dL Final     Creatinine   Date Value Ref Range Status   09/18/2017 0.90 0.60 - 1.30 mg/dL Final     Potassium   Date Value Ref Range Status   09/18/2017 4.3 3.5 - 5.5 mmol/L Final     ALT (SGPT)   Date Value Ref Range Status   04/07/2016 23 7 - 40 Units/L Final     AST (SGOT)   Date Value Ref Range Status   04/07/2016 20 0 - 33 Units/L Final     WBC   Date Value Ref Range Status   09/18/2017 5.04 3.50 - 10.80 10*3/mm3 Final     Hemoglobin   Date Value Ref Range Status   09/18/2017 15.5 13.1 - 17.5 g/dL Final     Hematocrit   Date Value Ref Range Status   09/18/2017 46.6 38.9 - 50.9 % Final     Platelets   Date Value Ref Range Status   09/18/2017 183 150 - 450 10*3/mm3 Corrected     Comment:     Sodium Citrate Platelet = 163       No results found for: CHOL  No results found for: TRIG  No results found for: HDL  No results found for: LDL  No components found for: LDLDIRECTC    Diagnostic Data:      ECG 12 Lead    Date/Time: 11/30/2020 9:14 AM  Performed by: Serg Lei MD  Authorized by: Serg Lei MD   Comparison: compared with previous ECG from 6/17/2020  Similar to previous ECG  Rhythm: sinus rhythm  Rate: normal  BPM: 76    Clinical impression: normal ECG  Comments: QRS 78 ms   ms            Results for orders placed in visit on 05/24/16   SCANNED - ECHOCARDIOGRAM     14-day Zio patch 10/2020  -No A. fib, NSVT highest HR approximately 130 bpm, NSAT    Stress test 3/17/2017  · Lexiscan cardiolite within normal limits.  · Pt held Metoprolol for 2 days but continued taking Tikosyn. Presented with SR in 130's at baseline while standing.  · Left ventricular  ejection fraction is normal (Calculated EF = 70%).    Assessment and Plan:   Diagnoses and all orders for this visit:    1. Precordial chest pain (Primary)  -Ongoing mild, intermittent chest pain.  Last ischemic evaluation in 2017 with an exercise stress test. Recent GI eval with EGD without significant findings. Recent heart monitor without significant findings.    -Exercise nuclear stress test to evaluate for evidence of ischemia.  -Transthoracic echocardiogram as previously ordered by Dr. Duarte.    -Advised patient to let us know if symptoms worsen.  Advised presentation to an ER if symptoms are at rest and unrelenting.  Discussed the future possibility of a heart catheterization if warranted.  -Patient is eager to have a heart catheterization if needed before year's end due to insurance reasons.  Discussed the possible risks of an invasive procedures without further evidence of ischemia, but if symptoms are severe, may warrant a heart catheterization sooner rather than later.    -Continue aspirin, metoprolol    2. PAF (paroxysmal atrial fibrillation) (CMS/HCC)  3. Typical atrial flutter (CMS/HCC)  4. Palpitations  -Followed by Dr. Daurte.  -Recent 14-day Zio patch without evidence of recurrent atrial fibrillation.  -Continue beta-blocker      - Return in about 3 months (around 2/28/2021) for Next scheduled follow up.    Scribed for Serg Lei MD by JASVIR Britt. 11/30/2020  10:01 EST    I, Serg Lei MD, personally performed the services as scribed by the above named individual. I have made any necessary edits and it is both accurate and complete.     Serg Lei MD, MSc, FACC, [place]  Interventional Cardiology  [facility]

## 2020-11-30 ENCOUNTER — CONSULT (OUTPATIENT)
Dept: CARDIOLOGY | Facility: CLINIC | Age: 65
End: 2020-11-30

## 2020-11-30 VITALS
WEIGHT: 214 LBS | HEART RATE: 76 BPM | DIASTOLIC BLOOD PRESSURE: 78 MMHG | BODY MASS INDEX: 31.7 KG/M2 | HEIGHT: 69 IN | SYSTOLIC BLOOD PRESSURE: 116 MMHG

## 2020-11-30 DIAGNOSIS — I48.0 PAF (PAROXYSMAL ATRIAL FIBRILLATION) (HCC): ICD-10-CM

## 2020-11-30 DIAGNOSIS — I48.3 TYPICAL ATRIAL FLUTTER (HCC): ICD-10-CM

## 2020-11-30 DIAGNOSIS — R07.2 PRECORDIAL CHEST PAIN: Primary | ICD-10-CM

## 2020-11-30 DIAGNOSIS — R00.2 PALPITATIONS: ICD-10-CM

## 2020-11-30 PROCEDURE — 99244 OFF/OP CNSLTJ NEW/EST MOD 40: CPT | Performed by: INTERNAL MEDICINE

## 2020-11-30 PROCEDURE — 93000 ELECTROCARDIOGRAM COMPLETE: CPT | Performed by: INTERNAL MEDICINE

## 2020-12-30 ENCOUNTER — HOSPITAL ENCOUNTER (OUTPATIENT)
Dept: CARDIOLOGY | Facility: HOSPITAL | Age: 65
Discharge: HOME | End: 2020-12-30
Admitting: INTERNAL MEDICINE

## 2020-12-30 VITALS — BODY MASS INDEX: 29.62 KG/M2 | HEIGHT: 69 IN | WEIGHT: 200 LBS

## 2020-12-30 LAB
ASCENDING AORTA: 2.5 CM
BH CV ECHO MEAS - AO MAX PG (FULL): 0.89 MMHG
BH CV ECHO MEAS - AO MAX PG: 6.6 MMHG
BH CV ECHO MEAS - AO MEAN PG (FULL): 1 MMHG
BH CV ECHO MEAS - AO MEAN PG: 4 MMHG
BH CV ECHO MEAS - AO ROOT AREA (BSA CORRECTED): 1.3
BH CV ECHO MEAS - AO ROOT AREA: 5.7 CM^2
BH CV ECHO MEAS - AO ROOT DIAM: 2.7 CM
BH CV ECHO MEAS - AO V2 MAX: 128 CM/SEC
BH CV ECHO MEAS - AO V2 MEAN: 87.5 CM/SEC
BH CV ECHO MEAS - AO V2 VTI: 25.6 CM
BH CV ECHO MEAS - ASC AORTA: 2.5 CM
BH CV ECHO MEAS - AVA(I,A): 2.9 CM^2
BH CV ECHO MEAS - AVA(I,D): 2.9 CM^2
BH CV ECHO MEAS - AVA(V,A): 2.9 CM^2
BH CV ECHO MEAS - AVA(V,D): 2.9 CM^2
BH CV ECHO MEAS - BSA(HAYCOCK): 2.1 M^2
BH CV ECHO MEAS - BSA: 2.1 M^2
BH CV ECHO MEAS - BZI_BMI: 29.5 KILOGRAMS/M^2
BH CV ECHO MEAS - BZI_METRIC_HEIGHT: 175.3 CM
BH CV ECHO MEAS - BZI_METRIC_WEIGHT: 90.7 KG
BH CV ECHO MEAS - EDV(CUBED): 58.4 ML
BH CV ECHO MEAS - EDV(MOD-SP2): 51 ML
BH CV ECHO MEAS - EDV(MOD-SP4): 44 ML
BH CV ECHO MEAS - EDV(TEICH): 65.1 ML
BH CV ECHO MEAS - EF(CUBED): 73.2 %
BH CV ECHO MEAS - EF(MOD-BP): 60 %
BH CV ECHO MEAS - EF(MOD-SP2): 54.9 %
BH CV ECHO MEAS - EF(MOD-SP4): 61.4 %
BH CV ECHO MEAS - EF(TEICH): 65.7 %
BH CV ECHO MEAS - ESV(CUBED): 15.6 ML
BH CV ECHO MEAS - ESV(MOD-SP2): 23 ML
BH CV ECHO MEAS - ESV(MOD-SP4): 17 ML
BH CV ECHO MEAS - ESV(TEICH): 22.3 ML
BH CV ECHO MEAS - FS: 35.6 %
BH CV ECHO MEAS - IVS/LVPW: 1
BH CV ECHO MEAS - IVSD: 1.1 CM
BH CV ECHO MEAS - LA DIMENSION: 3.6 CM
BH CV ECHO MEAS - LA/AO: 1.3
BH CV ECHO MEAS - LAD MAJOR: 4.5 CM
BH CV ECHO MEAS - LAT PEAK E' VEL: 9.8 CM/SEC
BH CV ECHO MEAS - LATERAL E/E' RATIO: 6.4
BH CV ECHO MEAS - LV DIASTOLIC VOL/BSA (35-75): 21.3 ML/M^2
BH CV ECHO MEAS - LV MASS(C)D: 138.1 GRAMS
BH CV ECHO MEAS - LV MASS(C)DI: 66.8 GRAMS/M^2
BH CV ECHO MEAS - LV MAX PG: 5.7 MMHG
BH CV ECHO MEAS - LV MEAN PG: 3 MMHG
BH CV ECHO MEAS - LV SYSTOLIC VOL/BSA (12-30): 8.2 ML/M^2
BH CV ECHO MEAS - LV V1 MAX: 119 CM/SEC
BH CV ECHO MEAS - LV V1 MEAN: 74.8 CM/SEC
BH CV ECHO MEAS - LV V1 VTI: 23.9 CM
BH CV ECHO MEAS - LVIDD: 3.9 CM
BH CV ECHO MEAS - LVIDS: 2.5 CM
BH CV ECHO MEAS - LVLD AP2: 7.6 CM
BH CV ECHO MEAS - LVLD AP4: 6.8 CM
BH CV ECHO MEAS - LVLS AP2: 7.2 CM
BH CV ECHO MEAS - LVLS AP4: 6.7 CM
BH CV ECHO MEAS - LVOT AREA (M): 3.1 CM^2
BH CV ECHO MEAS - LVOT AREA: 3.1 CM^2
BH CV ECHO MEAS - LVOT DIAM: 2 CM
BH CV ECHO MEAS - LVPWD: 1.1 CM
BH CV ECHO MEAS - MED PEAK E' VEL: 6.8 CM/SEC
BH CV ECHO MEAS - MEDIAL E/E' RATIO: 9.3
BH CV ECHO MEAS - MV A MAX VEL: 65.6 CM/SEC
BH CV ECHO MEAS - MV DEC SLOPE: 303 CM/SEC^2
BH CV ECHO MEAS - MV DEC TIME: 0.23 SEC
BH CV ECHO MEAS - MV E MAX VEL: 63.2 CM/SEC
BH CV ECHO MEAS - MV E/A: 0.96
BH CV ECHO MEAS - MV P1/2T MAX VEL: 84.2 CM/SEC
BH CV ECHO MEAS - MV P1/2T: 81.4 MSEC
BH CV ECHO MEAS - MVA P1/2T LCG: 2.6 CM^2
BH CV ECHO MEAS - MVA(P1/2T): 2.7 CM^2
BH CV ECHO MEAS - PA ACC SLOPE: 545 CM/SEC^2
BH CV ECHO MEAS - PA ACC TIME: 0.13 SEC
BH CV ECHO MEAS - PA PR(ACCEL): 20.3 MMHG
BH CV ECHO MEAS - RAP SYSTOLE: 3 MMHG
BH CV ECHO MEAS - RVSP: 28 MMHG
BH CV ECHO MEAS - SI(AO): 71 ML/M^2
BH CV ECHO MEAS - SI(CUBED): 20.7 ML/M^2
BH CV ECHO MEAS - SI(LVOT): 36.3 ML/M^2
BH CV ECHO MEAS - SI(MOD-SP2): 13.6 ML/M^2
BH CV ECHO MEAS - SI(MOD-SP4): 13.1 ML/M^2
BH CV ECHO MEAS - SI(TEICH): 20.7 ML/M^2
BH CV ECHO MEAS - SV(AO): 146.6 ML
BH CV ECHO MEAS - SV(CUBED): 42.8 ML
BH CV ECHO MEAS - SV(LVOT): 75.1 ML
BH CV ECHO MEAS - SV(MOD-SP2): 28 ML
BH CV ECHO MEAS - SV(MOD-SP4): 27 ML
BH CV ECHO MEAS - SV(TEICH): 42.8 ML
BH CV ECHO MEAS - TAPSE (>1.6): 1.9 CM
BH CV ECHO MEAS - TR MAX PG: 25 MMHG
BH CV ECHO MEAS - TR MAX VEL: 248 CM/SEC
BH CV ECHO MEASUREMENTS AVERAGE E/E' RATIO: 7.61
BH CV VAS BP RIGHT ARM: NORMAL MMHG
BH CV XLRA - RV BASE: 2.6 CM
BH CV XLRA - RV LENGTH: 6.3 CM
BH CV XLRA - RV MID: 2.6 CM
BH CV XLRA - TDI S': 9 CM/SEC
LEFT ATRIUM VOLUME INDEX: 13 ML/M2
LV EF 2D ECHO EST: 60 %
MAXIMAL PREDICTED HEART RATE: 153 BPM
STRESS TARGET HR: 130 BPM

## 2020-12-30 PROCEDURE — 93306 TTE W/DOPPLER COMPLETE: CPT | Performed by: INTERNAL MEDICINE

## 2020-12-30 PROCEDURE — 93306 TTE W/DOPPLER COMPLETE: CPT

## 2021-01-04 ENCOUNTER — APPOINTMENT (OUTPATIENT)
Dept: PREADMISSION TESTING | Facility: HOSPITAL | Age: 66
End: 2021-01-04

## 2021-01-04 PROCEDURE — U0004 COV-19 TEST NON-CDC HGH THRU: HCPCS

## 2021-01-04 PROCEDURE — C9803 HOPD COVID-19 SPEC COLLECT: HCPCS

## 2021-01-05 LAB — SARS-COV-2 RNA RESP QL NAA+PROBE: NOT DETECTED

## 2021-01-07 ENCOUNTER — TELEPHONE (OUTPATIENT)
Dept: CARDIOLOGY | Facility: CLINIC | Age: 66
End: 2021-01-07

## 2021-01-07 ENCOUNTER — HOSPITAL ENCOUNTER (OUTPATIENT)
Dept: CARDIOLOGY | Facility: HOSPITAL | Age: 66
Discharge: HOME | End: 2021-01-07

## 2021-01-07 VITALS
HEART RATE: 103 BPM | HEIGHT: 69 IN | BODY MASS INDEX: 29.62 KG/M2 | SYSTOLIC BLOOD PRESSURE: 150 MMHG | OXYGEN SATURATION: 99 % | DIASTOLIC BLOOD PRESSURE: 86 MMHG | WEIGHT: 200 LBS | RESPIRATION RATE: 18 BRPM

## 2021-01-07 DIAGNOSIS — R07.2 PRECORDIAL CHEST PAIN: ICD-10-CM

## 2021-01-07 LAB
BH CV STRESS BP STAGE 2: NORMAL
BH CV STRESS BP STAGE 4: NORMAL
BH CV STRESS COMMENTS STAGE 1: NORMAL
BH CV STRESS DOSE REGADENOSON STAGE 1: 0.4
BH CV STRESS DURATION MIN STAGE 1: 1
BH CV STRESS DURATION MIN STAGE 2: 1
BH CV STRESS DURATION MIN STAGE 3: 1
BH CV STRESS DURATION MIN STAGE 4: 1
BH CV STRESS DURATION SEC STAGE 1: 0
BH CV STRESS DURATION SEC STAGE 2: 0
BH CV STRESS DURATION SEC STAGE 3: 0
BH CV STRESS DURATION SEC STAGE 4: 0
BH CV STRESS GRADE STAGE 1: 10
BH CV STRESS HR STAGE 1: 137
BH CV STRESS HR STAGE 2: 146
BH CV STRESS HR STAGE 3: 142
BH CV STRESS HR STAGE 4: 139
BH CV STRESS METS STAGE 1: 5
BH CV STRESS O2 STAGE 1: 97
BH CV STRESS O2 STAGE 2: 99
BH CV STRESS O2 STAGE 3: 99
BH CV STRESS O2 STAGE 4: 98
BH CV STRESS PROTOCOL 1: NORMAL
BH CV STRESS RECOVERY BP: NORMAL MMHG
BH CV STRESS RECOVERY HR: 125 BPM
BH CV STRESS RECOVERY O2: 99 %
BH CV STRESS SPEED STAGE 1: 1.7
BH CV STRESS STAGE 1: 1
BH CV STRESS STAGE 2: 2
BH CV STRESS STAGE 3: 3
BH CV STRESS STAGE 4: 4
LV EF NUC BP: 77 %
MAXIMAL PREDICTED HEART RATE: 153 BPM
PERCENT MAX PREDICTED HR: 95.42 %
STRESS BASELINE BP: NORMAL MMHG
STRESS BASELINE HR: 100 BPM
STRESS O2 SAT REST: 99 %
STRESS PERCENT HR: 112 %
STRESS POST ESTIMATED WORKLOAD: 1 METS
STRESS POST EXERCISE DUR MIN: 4 MIN
STRESS POST EXERCISE DUR SEC: 0 SEC
STRESS POST O2 SAT PEAK: 99 %
STRESS POST PEAK BP: NORMAL MMHG
STRESS POST PEAK HR: 146 BPM
STRESS TARGET HR: 130 BPM

## 2021-01-07 PROCEDURE — 78452 HT MUSCLE IMAGE SPECT MULT: CPT | Performed by: INTERNAL MEDICINE

## 2021-01-07 PROCEDURE — 93018 CV STRESS TEST I&R ONLY: CPT | Performed by: INTERNAL MEDICINE

## 2021-01-07 PROCEDURE — 0 TECHNETIUM SESTAMIBI: Performed by: NURSE PRACTITIONER

## 2021-01-07 PROCEDURE — 93017 CV STRESS TEST TRACING ONLY: CPT

## 2021-01-07 PROCEDURE — 78452 HT MUSCLE IMAGE SPECT MULT: CPT

## 2021-01-07 PROCEDURE — 25010000002 REGADENOSON 0.4 MG/5ML SOLUTION: Performed by: NURSE PRACTITIONER

## 2021-01-07 PROCEDURE — A9500 TC99M SESTAMIBI: HCPCS | Performed by: NURSE PRACTITIONER

## 2021-01-07 RX ADMIN — TECHNETIUM TC 99M SESTAMIBI 1 DOSE: 1 INJECTION INTRAVENOUS at 10:35

## 2021-01-07 RX ADMIN — TECHNETIUM TC 99M SESTAMIBI 1 DOSE: 1 INJECTION INTRAVENOUS at 08:35

## 2021-01-07 RX ADMIN — REGADENOSON 0.4 MG: 0.08 INJECTION, SOLUTION INTRAVENOUS at 10:32

## 2021-01-07 NOTE — TELEPHONE ENCOUNTER
----- Message from JASVIR Villa sent at 1/7/2021  2:09 PM EST -----  Can you let the patient know that his stress test was without evidence of ischemia. No changes to be made at this time.

## 2021-03-01 ENCOUNTER — OFFICE VISIT (OUTPATIENT)
Dept: CARDIOLOGY | Facility: CLINIC | Age: 66
End: 2021-03-01

## 2021-03-01 VITALS
SYSTOLIC BLOOD PRESSURE: 126 MMHG | TEMPERATURE: 96.4 F | HEART RATE: 75 BPM | BODY MASS INDEX: 30.36 KG/M2 | DIASTOLIC BLOOD PRESSURE: 84 MMHG | WEIGHT: 205 LBS | OXYGEN SATURATION: 97 % | HEIGHT: 69 IN

## 2021-03-01 DIAGNOSIS — R07.2 PRECORDIAL CHEST PAIN: Primary | ICD-10-CM

## 2021-03-01 PROCEDURE — 99213 OFFICE O/P EST LOW 20 MIN: CPT | Performed by: INTERNAL MEDICINE

## 2021-03-01 RX ORDER — ZINC 25 MG
TABLET ORAL DAILY
COMMUNITY

## 2021-03-01 NOTE — PROGRESS NOTES
[facility] Cardiology  [address], [address]  [place], [place], [address]    [phone]  [email or web address]           OUTPATIENT CLINIC FOLLOW-UP NOTE    Patient care team:  Patient Care Team:  Jsuto Cervantes MD as PCP - General (Internal Medicine)      Subjective:   Chief complaint:   Chief Complaint   Patient presents with   • precordial chest pain       HPI:    Mike Knapp Jr. is a 65 y.o. male.   at [facility]    Partial problem list, including cardiac problems:  1. Chest pain:  a. Family history of CAD, father diagnosed in his 40s  b. Exercise GXT, Dr. Welch: Exercise duration 4 minutes and 23 seconds with no evidence of inducible ischemia.   c. Echocardiogram, 05/27/2014, Dr. Welch, revealing normal left ventricular ejection fraction (60%) with normal valvular structures.   d. Negative stress and echo 1/2021  2. Atrial flutter/atrial fibrillation:  a. Documentation by EKG, 08/25/2014.  b. Typical flutter ablation 10/17/2014.   c. Previously on sotalol 80 mg b.i.d.   1. Possibly causing symptoms of feeling drained and also a heavy pressure throughout body   d. Later switched to flecainide 50 mg b.i.d. and metoprolol 12.5 mg b.i.d.   e. Switched to Tikosyn 2/2017 with recurrent Afib and PACs  f. Jan 2017 Zio patch - symptomatic atrial arrhythmias including atrial fibrillation.  g. Sept 2017 s/p redo PVA with ablation along the roof of the RANDY with a roof and along fractioned signals on the posterior wall.   3. Thrombocytopenia:  a. Platelet count in October 2015 was 217 and most recent 92.  4. Hyperlipidemia.   5. Generalized anxiety disorder.   6. History of Clemons's esophagus/gastroesophageal reflux disease.   7. Osteoarthritis.   8. Recent colitis in 2014.  9. Left ear tinnitus secondary to a viral illness.   10. Benign lung cyst.     He presents today for follow-up..    The patient states that since his last visit his chest pain has not  recurred.  He is doing well from a cardiac standpoint.  Denies significant palpitations.      Review of Systems:  Negative for chest pain, dyspnea, significant palpitations    PFSH:  Patient Active Problem List   Diagnosis   • Chest pain   • Typical atrial flutter (CMS/HCC)   • Thrombocytopenia (CMS/HCC)   • Hyperlipidemia   • Osteoarthritis   • PAF (paroxysmal atrial fibrillation) (CMS/HCC)         Current Outpatient Medications:   •  aspirin 81 MG EC tablet, Take 81 mg by mouth Daily., Disp: , Rfl:   •  cholecalciferol (VITAMIN D3) 1000 UNITS tablet, Take 4,000 Units by mouth Every Morning., Disp: , Rfl:   •  diphenhydrAMINE (BENADRYL) 25 mg capsule, Take 12.5 mg by mouth Every 6 (Six) Hours As Needed for Itching., Disp: , Rfl:   •  ibuprofen (ADVIL,MOTRIN) 400 MG tablet, Take 400 mg by mouth Every 6 (Six) Hours As Needed for Mild Pain ., Disp: , Rfl:   •  lansoprazole (PREVACID) 30 MG capsule, TAKE 1 CAPSULE BY MOUTH EVERY MORNING BEFORE BREAKFAST, Disp: 90 capsule, Rfl: 3  •  Loratadine 10 MG capsule, Take 10 mg by mouth Daily As Needed. PRN, Disp: , Rfl:   •  melatonin 5 MG tablet tablet, Take 5 mg by mouth Every Night., Disp: , Rfl:   •  Methylcellulose, Laxative, (SOLUBLE FIBER PO), Take 1 teaspoon(s) by mouth Daily., Disp: , Rfl:   •  metoprolol tartrate (LOPRESSOR) 25 MG tablet, Take 2 tablets by mouth 2 (Two) Times a Day., Disp: 120 tablet, Rfl: 3  •  promethazine (PHENERGAN) 25 MG tablet, Take 25 mg by mouth Every 6 (Six) Hours As Needed for Nausea or Vomiting., Disp: , Rfl:   •  Psyllium (METAMUCIL PO), Take 1 teaspoon(s) by mouth Daily., Disp: , Rfl:   •  TURMERIC PO, Take  by mouth., Disp: , Rfl:   •  vitamin B-12 (CYANOCOBALAMIN) 1000 MCG tablet, Take 1,000 mcg by mouth Every Morning., Disp: , Rfl:   •  Zinc 25 MG tablet, Take  by mouth Daily., Disp: , Rfl:     Allergies   Allergen Reactions   • Sulfa Antibiotics Rash   • Statins Myalgia       Social History     Socioeconomic History   • Marital  "status:      Spouse name: Not on file   • Number of children: Not on file   • Years of education: Not on file   • Highest education level: Not on file   Tobacco Use   • Smoking status: Never Smoker   • Smokeless tobacco: Never Used   Substance and Sexual Activity   • Alcohol use: No   • Drug use: No   • Sexual activity: Defer   Social History Narrative    Caffeine use: none.      Family History   Problem Relation Age of Onset   • Arrhythmia Mother    • Hypertension Mother    • Diabetes Mother    • Heart disease Father    • Colon polyps Father    • Colon cancer Cousin          Objective:   Physical Exam:  /84 (BP Location: Right arm, Patient Position: Sitting, Cuff Size: Adult)   Pulse 75   Temp 96.4 °F (35.8 °C)   Ht 175.3 cm (69\")   Wt 93 kg (205 lb)   SpO2 97%   BMI 30.27 kg/m²   CONSTITUTIONAL: Well-nourished. In no acute distress.   CARDIOVASCULAR: Regular rate and rhythm with a normal S1 and S2. There is no murmur, gallop, rub, or click appreciated. Normal radial pulse. There is no peripheral edema.      11/2020 exam: Posterior tibial pulses are 2+ bilaterally. Carotid upstrokes are 2+ and symmetrical without bruits.        Labs:  BUN   Date Value Ref Range Status   09/18/2017 13 9 - 23 mg/dL Final     Creatinine   Date Value Ref Range Status   09/18/2017 0.90 0.60 - 1.30 mg/dL Final     Potassium   Date Value Ref Range Status   09/18/2017 4.3 3.5 - 5.5 mmol/L Final     ALT (SGPT)   Date Value Ref Range Status   04/07/2016 23 7 - 40 Units/L Final     AST (SGOT)   Date Value Ref Range Status   04/07/2016 20 0 - 33 Units/L Final     WBC   Date Value Ref Range Status   09/18/2017 5.04 3.50 - 10.80 10*3/mm3 Final     Hemoglobin   Date Value Ref Range Status   09/18/2017 15.5 13.1 - 17.5 g/dL Final     Hematocrit   Date Value Ref Range Status   09/18/2017 46.6 38.9 - 50.9 % Final     Platelets   Date Value Ref Range Status   09/18/2017 183 150 - 450 10*3/mm3 Corrected     Comment:     Sodium " Citrate Platelet = 163       No results found for: CHOL  No results found for: TRIG  No results found for: HDL  No results found for: LDL  No components found for: LDLDIRECTC    Diagnostic Data:    Procedures    Results for orders placed in visit on 11/16/20   Adult Transthoracic Echo Complete W/ Cont if Necessary Per Protocol    Narrative · Left ventricular systolic function is normal. Estimated left ventricular   EF = 60%  · Left ventricular wall thickness is consistent with borderline concentric   hypertrophy.        14-day Zio patch 10/2020  -No A. fib, NSVT highest HR approximately 130 bpm, NSAT    Stress test 1/2021  · Left ventricular ejection fraction is hyperdynamic (Calculated EF > 70%). .  · Myocardial perfusion imaging indicates a normal myocardial perfusion study with no evidence of ischemia.  · Impressions are consistent with a low risk study.    Stress test 3/17/2017  · Lexiscan cardiolite within normal limits.  · Pt held Metoprolol for 2 days but continued taking Tikosyn. Presented with SR in 130's at baseline while standing.  · Left ventricular ejection fraction is normal (Calculated EF = 70%).    Assessment and Plan:   Diagnoses and all orders for this visit:    Precordial chest pain   Chronic abdominal complaints  -Symptoms resolved.  Stress test and echo - 1/2021  -Discussed increasing his cardiac fitness and continue to work on a heart healthy diet.  The patient has had multiple prior abdominal surgeries/digestive issues.  His diet often needs to be tailored to the state of his GI health.  -Seeing a functional healthcare provider; on the SIBO diet  -Continue aspirin, metoprolol    PAF (paroxysmal atrial fibrillation)   Typical atrial flutter   Palpitations  -Followed by Dr. Duarte.  -Continue beta-blocker    - The patient wishes to continue to see an interventional cardiologist along with electrophysiology.  - Return in about 1 year (around 3/1/2022) for Next follow up with an ECG, if none in  last year.    Serg Lei MD, MSc, FACC, [facility]  Interventional Cardiology  [facility]

## 2021-05-10 ENCOUNTER — OFFICE VISIT (OUTPATIENT)
Dept: GASTROENTEROLOGY | Facility: CLINIC | Age: 66
End: 2021-05-10

## 2021-05-10 VITALS
SYSTOLIC BLOOD PRESSURE: 140 MMHG | DIASTOLIC BLOOD PRESSURE: 80 MMHG | WEIGHT: 196 LBS | BODY MASS INDEX: 28.94 KG/M2 | HEART RATE: 84 BPM

## 2021-05-10 DIAGNOSIS — K21.9 GASTROESOPHAGEAL REFLUX DISEASE WITHOUT ESOPHAGITIS: ICD-10-CM

## 2021-05-10 DIAGNOSIS — K63.89 SMALL INTESTINAL BACTERIAL OVERGROWTH (SIBO): ICD-10-CM

## 2021-05-10 DIAGNOSIS — K22.70 BARRETT'S ESOPHAGUS WITHOUT DYSPLASIA: ICD-10-CM

## 2021-05-10 DIAGNOSIS — R19.7 DIARRHEA, UNSPECIFIED TYPE: Primary | ICD-10-CM

## 2021-05-10 PROCEDURE — 99214 OFFICE O/P EST MOD 30 MIN: CPT | Performed by: INTERNAL MEDICINE

## 2021-05-10 NOTE — PROGRESS NOTES
PCP:  Justo Cervantes MD     No referring provider defined for this encounter.    Chief Complaint   Patient presents with   • Follow-up        HPI   The patient is a 65-year-old gentleman known to me.  He has a history of short segment Clemons's esophagus.  He has been on Prevacid.  He recently has reduced the Prevacid to 15 mg a day.  He has stated he would like to get off the Prevacid if possible.  He is read about the proton pump inhibitors and is concerned about some of the long-term side effects.  He specifically mentioned heart disease and some other potential long-term complications.  He also has been tested through an outside physician for small bowel intestinal bacterial overgrowth.  His testing apparently showed elevated levels of hydrogen and methane and a 3-hour breath test.  The physician recommended Xifaxan 500 mg 550 mg 3 times daily for 14 days and Neomycin 500 mg twice daily for 14 days.  Apparently it was not prescribed for him there.  His understanding there is some issue with the insurance companies.  He did have an upper endoscopy 8/26/2020.  At that time he had a hiatal hernia and some mild gastritis.  He also had a minimally irregular Z-line that was biopsied.  Biopsy showed extensive goblet cell metaplasia consistent with Clemons's esophagus.  He has been full gas deficient and he thinks that may be from one of his medications.  He does have some trouble with diarrhea at times as well as bloating.  He is wondering if this is from his previous colon resection.  He follows with Dr. Quezada from that standpoint.  I believe his resection was back in 2005.  He is post cholecystectomy as well.  He has tried Colestid with some results but certainly not complete.    Allergies   Allergen Reactions   • Sulfa Antibiotics Rash   • Statins Myalgia          Current Outpatient Medications:   •  aspirin 81 MG EC tablet, Take 81 mg by mouth Daily., Disp: , Rfl:   •  cholecalciferol (VITAMIN D3) 1000 UNITS  tablet, Take 4,000 Units by mouth Every Morning., Disp: , Rfl:   •  diphenhydrAMINE (BENADRYL) 25 mg capsule, Take 12.5 mg by mouth Every 6 (Six) Hours As Needed for Itching., Disp: , Rfl:   •  ibuprofen (ADVIL,MOTRIN) 400 MG tablet, Take 400 mg by mouth Every 6 (Six) Hours As Needed for Mild Pain ., Disp: , Rfl:   •  lansoprazole (PREVACID) 30 MG capsule, TAKE 1 CAPSULE BY MOUTH EVERY MORNING BEFORE BREAKFAST, Disp: 90 capsule, Rfl: 3  •  Loratadine 10 MG capsule, Take 10 mg by mouth Daily As Needed. PRN, Disp: , Rfl:   •  melatonin 5 MG tablet tablet, Take 5 mg by mouth Every Night., Disp: , Rfl:   •  Methylcellulose, Laxative, (SOLUBLE FIBER PO), Take 1 teaspoon(s) by mouth Daily., Disp: , Rfl:   •  metoprolol tartrate (LOPRESSOR) 25 MG tablet, Take 2 tablets by mouth 2 (Two) Times a Day., Disp: 120 tablet, Rfl: 3  •  promethazine (PHENERGAN) 25 MG tablet, Take 25 mg by mouth Every 6 (Six) Hours As Needed for Nausea or Vomiting., Disp: , Rfl:   •  Psyllium (METAMUCIL PO), Take 1 teaspoon(s) by mouth Daily., Disp: , Rfl:   •  riFAXIMin (Xifaxan) 550 MG tablet, Take 1 tablet by mouth 3 times daily x 14 days, Disp: 42 tablet, Rfl: 0  •  TURMERIC PO, Take  by mouth., Disp: , Rfl:   •  vitamin B-12 (CYANOCOBALAMIN) 1000 MCG tablet, Take 1,000 mcg by mouth Every Morning., Disp: , Rfl:   •  Zinc 25 MG tablet, Take  by mouth Daily., Disp: , Rfl:      Past Medical History:   Diagnosis Date   • AF (atrial fibrillation) (CMS/HCC)    • Atrial flutter (CMS/HCC) 8/25/2016    atrial fibrillation: Documentation by EKG, 08/25/2014. Patient is status post EP study and typical flutter ablation on 10/17/2014.   On 04/19/2014, patient noted palpitations at home and went to the emergency department.  On telemetry monitor at the outside hospital, patient was noted to be in type 1 atrial fibrillation with rates of about 140 beats per minute.  There was no atrial flutter noted o   • Barretts esophagus    • Chest pain    • Colitis 2014  "  • Colitis 2014   • Contact dermatitis due to ragweed     \"RAW THROAT\"   • CLAUDIA (generalized anxiety disorder)    • GERD (gastroesophageal reflux disease)     Clemons's esophagus H/O   • H/O atrial flutter    • Hyperlipidemia    • Lung cyst     benign   • Lung cyst     Benign    • Osteoarthritis    • Osteoarthritis    • PONV (postoperative nausea and vomiting)    • Sleeplessness    • Small intestinal bacterial overgrowth (SIBO) 02/2021   • Thrombocytopenia (CMS/HCC)    • Tinnitus     Left ear Secondary to a viral illness    • Tinnitus of left ear     secondary to viral illness       Past Surgical History:   Procedure Laterality Date   • APPENDECTOMY     • CARDIAC ABLATION     • CARDIAC ELECTROPHYSIOLOGY PROCEDURE N/A 9/19/2017    Procedure: Ablation atrial fibrillation;  Surgeon: Adithya Lynch DO;  Location: [facility] INVASIVE LOCATION;  Service:    • CHOLECYSTECTOMY     • COLECTOMY PARTIAL / TOTAL     • COLONOSCOPY     • HERNIA REPAIR     • INGUINAL HERNIA REPAIR Left    • KNEE SURGERY  1960   • LASIK     • LASIK     • WISDOM TOOTH EXTRACTION          Social History     Socioeconomic History   • Marital status:      Spouse name: Not on file   • Number of children: Not on file   • Years of education: Not on file   • Highest education level: Not on file   Tobacco Use   • Smoking status: Never Smoker   • Smokeless tobacco: Never Used   Substance and Sexual Activity   • Alcohol use: No   • Drug use: No   • Sexual activity: Defer        Family History   Problem Relation Age of Onset   • Arrhythmia Mother    • Hypertension Mother    • Diabetes Mother    • Heart disease Father    • Colon polyps Father    • Colon cancer Cousin         Review of Systems   Constitutional: Negative.    HENT: Negative for trouble swallowing and voice change.    Gastrointestinal: Positive for diarrhea.        Vitals:    05/10/21 1443   BP: 140/80   Pulse: 84        Physical Exam   General Appearance: Alert, in no acute distress   Head: " Normocephalic, without obvious abnormality, atraumatic   Eyes: Lids and lashes normal, conjunctivae and sclerae normal, no icterus, no pallor, corneas clear, PERRLA   Ears: Ears appear intact with no abnormalities noted   Extremities: Moves all extremities well, no edema, no cyanosis, no redness   Skin: No bleeding, bruising or rash   Neurologic: Cranial nerves 2 - 12 grossly intact, no focal deficits     Review of systems was reviewed and positives are noted. All of the remaining review of systems in that system are negative.    Diagnoses and all orders for this visit:    1. Diarrhea, unspecified type (Primary)    2. Small intestinal bacterial overgrowth (SIBO)    3. Clemons's esophagus without dysplasia    4. Gastroesophageal reflux disease without esophagitis    Other orders  -     riFAXIMin (Xifaxan) 550 MG tablet; Take 1 tablet by mouth 3 times daily x 14 days  Dispense: 42 tablet; Refill: 0    Impressions and plan #1 small bowel bacterial overgrowth: He would like to get a prescription for Xifaxan and neomycin.  We pulled up the latest literature on up-to-date.  Xifaxan certainly is a first-line therapy for many physicians.  I think it would be a good choice for him as its not systemically absorbed for the most part.  Neomycin is an older medicine that we do not use as much anymore.  I told him I would look that up and if there was not any reasons we should not try it that I did not have any objections as his doctor wanted him to take both.  It sounds like it may be easier for him to get the medications through me for insurance reasons.  We talked extensively about the microbe I am of the colon.  We talked about how it is different in patients with diseases such as ulcerative colitis or Crohn's disease in normals.  It is also different in patients with colon cancer.  The whole picture of the microbiota is not well understood at this point.  We talked briefly about the probiotic story.  He has some reluctance  about antibiotics as well but felt that this might be a good idea in him.  I told him I did have another patient with an ileocecal valve resection that had small bowel bacterial overgrowth and responded nicely to Xifaxan.  He will need a repeat course periodically.  We could check a celiac panel as well if he does not have good response.  I did give him a FODMAP diet as well.        #2 Clemons's esophagus and gastroesophageal reflux: We talked about that at length as well.  The current recommendations are proton pump inhibitor.  I think if his symptoms are well controlled with 15 mg of Prevacid that is reasonable.  Occasionally patient will get diarrhea from Prevacid.  In addition microscopic colitis is associated with the proton pump inhibitor family.  Dr. Quezada does his colonoscopies as I can recall.  We talked about the safety profile of the proton pump inhibitors at length.  We talked about the initial concerns about carcinoid tumors, osteoporosis, interference with medicines such as Plavix, heart disease, and more recently kidney disease and Alzheimer's.  We talked about the fact that patients with Clemons's esophagus have an increased risk of esophageal cancer developing.  We talked about the estimates for development of esophageal cancer.  Thankfully it still relatively low.  I gave him an information packet about the safety profile of the proton pump inhibitor family as well.    Ger Chavarria MD

## 2021-06-23 RX ORDER — LANSOPRAZOLE 15 MG/1
CAPSULE, DELAYED RELEASE ORAL
Qty: 90 CAPSULE | Refills: 3 | Status: SHIPPED | OUTPATIENT
Start: 2021-06-23 | End: 2022-03-07

## 2021-06-23 NOTE — TELEPHONE ENCOUNTER
Received call from [facility] and the patient is wanting to get a rx for lansoprazole 15mg daily instead of 30mg.  rx sent to pharmacy

## 2021-07-12 ENCOUNTER — TELEPHONE (OUTPATIENT)
Dept: GASTROENTEROLOGY | Facility: CLINIC | Age: 66
End: 2021-07-12

## 2021-07-12 NOTE — TELEPHONE ENCOUNTER
Call from pharmacy. RX ordered 15 mg Omeprazole Delayed release. Ins. Covers 15 mg ODT. Does provider want to change prescription to ODT so it's covered?

## 2021-07-12 NOTE — TELEPHONE ENCOUNTER
Called pharmacy and confirmed that the patients insurance is covering the prescription for the Lansoprazole 15 mg that was prescribed 06/23/2021.

## 2021-07-15 ENCOUNTER — HOSPITAL ENCOUNTER (EMERGENCY)
Facility: HOSPITAL | Age: 66
Discharge: HOME | End: 2021-07-15
Attending: EMERGENCY MEDICINE | Admitting: EMERGENCY MEDICINE

## 2021-07-15 ENCOUNTER — APPOINTMENT (OUTPATIENT)
Dept: CT IMAGING | Facility: HOSPITAL | Age: 66
End: 2021-07-15

## 2021-07-15 VITALS
WEIGHT: 183 LBS | OXYGEN SATURATION: 97 % | TEMPERATURE: 98.5 F | HEART RATE: 99 BPM | SYSTOLIC BLOOD PRESSURE: 141 MMHG | BODY MASS INDEX: 27.11 KG/M2 | DIASTOLIC BLOOD PRESSURE: 89 MMHG | RESPIRATION RATE: 12 BRPM | HEIGHT: 69 IN

## 2021-07-15 DIAGNOSIS — R11.0 NAUSEA: Primary | ICD-10-CM

## 2021-07-15 DIAGNOSIS — K57.90 DIVERTICULOSIS: ICD-10-CM

## 2021-07-15 LAB
ALBUMIN SERPL-MCNC: 4.1 G/DL (ref 3.5–5.2)
ALBUMIN/GLOB SERPL: 1.6 G/DL
ALP SERPL-CCNC: 81 U/L (ref 39–117)
ALT SERPL W P-5'-P-CCNC: 21 U/L (ref 1–41)
ANION GAP SERPL CALCULATED.3IONS-SCNC: 11 MMOL/L (ref 5–15)
AST SERPL-CCNC: 28 U/L (ref 1–40)
BASOPHILS # BLD AUTO: 0.04 10*3/MM3 (ref 0–0.2)
BASOPHILS NFR BLD AUTO: 0.6 % (ref 0–1.5)
BILIRUB SERPL-MCNC: 0.9 MG/DL (ref 0–1.2)
BILIRUB UR QL STRIP: NEGATIVE
BUN SERPL-MCNC: 8 MG/DL (ref 8–23)
BUN/CREAT SERPL: 8.2 (ref 7–25)
CALCIUM SPEC-SCNC: 9.5 MG/DL (ref 8.6–10.5)
CHLORIDE SERPL-SCNC: 102 MMOL/L (ref 98–107)
CLARITY UR: CLEAR
CO2 SERPL-SCNC: 25 MMOL/L (ref 22–29)
COLOR UR: YELLOW
CREAT SERPL-MCNC: 0.98 MG/DL (ref 0.76–1.27)
D-LACTATE SERPL-SCNC: 2.3 MMOL/L (ref 0.5–2)
DEPRECATED RDW RBC AUTO: 46.3 FL (ref 37–54)
EOSINOPHIL # BLD AUTO: 0.1 10*3/MM3 (ref 0–0.4)
EOSINOPHIL NFR BLD AUTO: 1.5 % (ref 0.3–6.2)
ERYTHROCYTE [DISTWIDTH] IN BLOOD BY AUTOMATED COUNT: 13.1 % (ref 12.3–15.4)
GFR SERPL CREATININE-BSD FRML MDRD: 76 ML/MIN/1.73
GLOBULIN UR ELPH-MCNC: 2.5 GM/DL
GLUCOSE SERPL-MCNC: 139 MG/DL (ref 65–99)
GLUCOSE UR STRIP-MCNC: NEGATIVE MG/DL
HCT VFR BLD AUTO: 46.8 % (ref 37.5–51)
HGB BLD-MCNC: 15.5 G/DL (ref 13–17.7)
HGB UR QL STRIP.AUTO: NEGATIVE
HOLD SPECIMEN: NORMAL
IMM GRANULOCYTES # BLD AUTO: 0.02 10*3/MM3 (ref 0–0.05)
IMM GRANULOCYTES NFR BLD AUTO: 0.3 % (ref 0–0.5)
KETONES UR QL STRIP: NEGATIVE
LEUKOCYTE ESTERASE UR QL STRIP.AUTO: NEGATIVE
LIPASE SERPL-CCNC: 31 U/L (ref 13–60)
LYMPHOCYTES # BLD AUTO: 0.79 10*3/MM3 (ref 0.7–3.1)
LYMPHOCYTES NFR BLD AUTO: 12.2 % (ref 19.6–45.3)
MCH RBC QN AUTO: 32.1 PG (ref 26.6–33)
MCHC RBC AUTO-ENTMCNC: 33.1 G/DL (ref 31.5–35.7)
MCV RBC AUTO: 96.9 FL (ref 79–97)
MONOCYTES # BLD AUTO: 0.47 10*3/MM3 (ref 0.1–0.9)
MONOCYTES NFR BLD AUTO: 7.2 % (ref 5–12)
NEUTROPHILS NFR BLD AUTO: 5.08 10*3/MM3 (ref 1.7–7)
NEUTROPHILS NFR BLD AUTO: 78.2 % (ref 42.7–76)
NITRITE UR QL STRIP: NEGATIVE
NRBC BLD AUTO-RTO: 0 /100 WBC (ref 0–0.2)
PH UR STRIP.AUTO: 7 [PH] (ref 5–8)
PLATELET # BLD AUTO: 225 10*3/MM3 (ref 140–450)
PMV BLD AUTO: 10 FL (ref 6–12)
POTASSIUM SERPL-SCNC: 4.3 MMOL/L (ref 3.5–5.2)
PROT SERPL-MCNC: 6.6 G/DL (ref 6–8.5)
PROT UR QL STRIP: NEGATIVE
RBC # BLD AUTO: 4.83 10*6/MM3 (ref 4.14–5.8)
SODIUM SERPL-SCNC: 138 MMOL/L (ref 136–145)
SP GR UR STRIP: 1.01 (ref 1–1.03)
UROBILINOGEN UR QL STRIP: NORMAL
WBC # BLD AUTO: 6.5 10*3/MM3 (ref 3.4–10.8)
WHOLE BLOOD HOLD SPECIMEN: NORMAL

## 2021-07-15 PROCEDURE — 25010000002 IOPAMIDOL 61 % SOLUTION: Performed by: EMERGENCY MEDICINE

## 2021-07-15 PROCEDURE — 99284 EMERGENCY DEPT VISIT MOD MDM: CPT

## 2021-07-15 PROCEDURE — 80053 COMPREHEN METABOLIC PANEL: CPT | Performed by: EMERGENCY MEDICINE

## 2021-07-15 PROCEDURE — 83690 ASSAY OF LIPASE: CPT | Performed by: EMERGENCY MEDICINE

## 2021-07-15 PROCEDURE — 99283 EMERGENCY DEPT VISIT LOW MDM: CPT

## 2021-07-15 PROCEDURE — 85025 COMPLETE CBC W/AUTO DIFF WBC: CPT | Performed by: EMERGENCY MEDICINE

## 2021-07-15 PROCEDURE — 74177 CT ABD & PELVIS W/CONTRAST: CPT

## 2021-07-15 PROCEDURE — 83605 ASSAY OF LACTIC ACID: CPT | Performed by: EMERGENCY MEDICINE

## 2021-07-15 PROCEDURE — 96374 THER/PROPH/DIAG INJ IV PUSH: CPT

## 2021-07-15 PROCEDURE — 25010000002 DROPERIDOL PER 5 MG: Performed by: EMERGENCY MEDICINE

## 2021-07-15 PROCEDURE — 81003 URINALYSIS AUTO W/O SCOPE: CPT | Performed by: EMERGENCY MEDICINE

## 2021-07-15 RX ORDER — SODIUM CHLORIDE 9 MG/ML
10 INJECTION INTRAVENOUS AS NEEDED
Status: DISCONTINUED | OUTPATIENT
Start: 2021-07-15 | End: 2021-07-15 | Stop reason: HOSPADM

## 2021-07-15 RX ORDER — ONDANSETRON 8 MG/1
8 TABLET, ORALLY DISINTEGRATING ORAL EVERY 8 HOURS PRN
Qty: 15 TABLET | Refills: 0 | Status: SHIPPED | OUTPATIENT
Start: 2021-07-15 | End: 2022-03-07

## 2021-07-15 RX ORDER — DROPERIDOL 2.5 MG/ML
2.5 INJECTION, SOLUTION INTRAMUSCULAR; INTRAVENOUS ONCE
Status: COMPLETED | OUTPATIENT
Start: 2021-07-15 | End: 2021-07-15

## 2021-07-15 RX ORDER — DICYCLOMINE HYDROCHLORIDE 10 MG/1
20 CAPSULE ORAL ONCE
Status: COMPLETED | OUTPATIENT
Start: 2021-07-15 | End: 2021-07-15

## 2021-07-15 RX ADMIN — IOPAMIDOL 95 ML: 612 INJECTION, SOLUTION INTRAVENOUS at 12:01

## 2021-07-15 RX ADMIN — SODIUM CHLORIDE, POTASSIUM CHLORIDE, SODIUM LACTATE AND CALCIUM CHLORIDE 500 ML: 600; 310; 30; 20 INJECTION, SOLUTION INTRAVENOUS at 10:12

## 2021-07-15 RX ADMIN — DICYCLOMINE HYDROCHLORIDE 20 MG: 10 CAPSULE ORAL at 10:13

## 2021-07-15 RX ADMIN — DROPERIDOL 2.5 MG: 2.5 INJECTION, SOLUTION INTRAMUSCULAR; INTRAVENOUS at 10:13

## 2021-07-15 NOTE — ED PROVIDER NOTES
Subjective   Patient is a 65 y.o. male presenting to the ED complaining of nausea. The patient reports that, for the past ten days, he has been having episodes of nausea, heart palpitations, and facial flushing. He can not identify any triggering event. The patient denies vomiting, diarrhea, melena, hematochezia, abdominal pain,  fever, chills, shortness of breath, and chest pain. The patient reports that he began a two week course of antibiotics two months ago for a small intestinal bacterial overgrowth. This then led to a flare up in his diverticulitis, requiring another two week round of antibiotics ending ten days ago. The patient reports that he has had these episodes since he ceased his antibiotics. The patient also has a history of a partial colon resection, cholecystectomy, atrial fibrillation treated with ablation, hypertension, and gastrointestinal reflux disease. There are no other acute symptoms at this time.      History provided by:  Patient  Nausea  The primary symptoms include nausea. Primary symptoms do not include fever, abdominal pain, vomiting, diarrhea, melena or hematochezia. The illness began more than 7 days ago. The onset was sudden. The problem has not changed since onset.  The illness does not include chills. Significant associated medical issues include GERD, bowel resection and diverticulitis.       Review of Systems   Constitutional: Negative for chills and fever.   Respiratory: Negative for shortness of breath.    Cardiovascular: Positive for palpitations. Negative for chest pain.   Gastrointestinal: Positive for nausea. Negative for abdominal pain, blood in stool, diarrhea, hematochezia, melena and vomiting.   Skin: Positive for color change (Facial flushing).   All other systems reviewed and are negative.      Past Medical History:   Diagnosis Date   • AF (atrial fibrillation) (CMS/HCC)    • Atrial flutter (CMS/HCC) 8/25/2016    atrial fibrillation: Documentation by EKG, 08/25/2014.  "Patient is status post EP study and typical flutter ablation on 10/17/2014.   On 04/19/2014, patient noted palpitations at home and went to the emergency department.  On telemetry monitor at the outside hospital, patient was noted to be in type 1 atrial fibrillation with rates of about 140 beats per minute.  There was no atrial flutter noted o   • Barretts esophagus    • Chest pain    • Colitis 2014   • Colitis 2014   • Contact dermatitis due to ragweed     \"RAW THROAT\"   • CLAUDIA (generalized anxiety disorder)    • GERD (gastroesophageal reflux disease)     Clemons's esophagus H/O   • H/O atrial flutter    • Hyperlipidemia    • Lung cyst     benign   • Lung cyst     Benign    • Osteoarthritis    • Osteoarthritis    • PONV (postoperative nausea and vomiting)    • Sleeplessness    • Small intestinal bacterial overgrowth (SIBO) 02/2021   • Thrombocytopenia (CMS/HCC)    • Tinnitus     Left ear Secondary to a viral illness    • Tinnitus of left ear     secondary to viral illness       Allergies   Allergen Reactions   • Sulfa Antibiotics Rash   • Statins Myalgia       Past Surgical History:   Procedure Laterality Date   • APPENDECTOMY     • CARDIAC ABLATION     • CARDIAC ELECTROPHYSIOLOGY PROCEDURE N/A 9/19/2017    Procedure: Ablation atrial fibrillation;  Surgeon: Adithya Lynch DO;  Location: [facility] INVASIVE LOCATION;  Service:    • CHOLECYSTECTOMY     • COLECTOMY PARTIAL / TOTAL     • COLONOSCOPY     • HERNIA REPAIR     • INGUINAL HERNIA REPAIR Left    • KNEE SURGERY  1960   • LASIK     • LASIK     • WISDOM TOOTH EXTRACTION         Family History   Problem Relation Age of Onset   • Arrhythmia Mother    • Hypertension Mother    • Diabetes Mother    • Heart disease Father    • Colon polyps Father    • Colon cancer Cousin        Social History     Socioeconomic History   • Marital status:      Spouse name: Not on file   • Number of children: Not on file   • Years of education: Not on file   • Highest education " level: Not on file   Tobacco Use   • Smoking status: Never Smoker   • Smokeless tobacco: Never Used   Substance and Sexual Activity   • Alcohol use: No   • Drug use: No   • Sexual activity: Defer         Objective   Physical Exam  Vitals and nursing note reviewed.   Constitutional:       General: He is not in acute distress.     Appearance: Normal appearance.   HENT:      Head: Normocephalic and atraumatic.      Right Ear: External ear normal.      Left Ear: External ear normal.      Nose: Nose normal.   Eyes:      General: No scleral icterus.     Conjunctiva/sclera: Conjunctivae normal.   Cardiovascular:      Rate and Rhythm: Normal rate and regular rhythm.      Heart sounds: Normal heart sounds.   Pulmonary:      Effort: Pulmonary effort is normal.      Breath sounds: Normal breath sounds.      Comments: Lungs clear to auscultation bilaterally.  Abdominal:      General: Bowel sounds are normal. There is no distension.      Palpations: Abdomen is soft.      Tenderness: There is no abdominal tenderness. There is no guarding or rebound.   Musculoskeletal:         General: Normal range of motion.      Cervical back: Normal range of motion and neck supple.      Right lower leg: No edema.      Left lower leg: No edema.   Skin:     General: Skin is warm and dry.   Neurological:      Mental Status: He is alert and oriented to person, place, and time.   Psychiatric:         Mood and Affect: Mood normal.         Behavior: Behavior normal.         Procedures         ED Course  ED Course as of Jul 15 1509   Thu Jul 15, 2021   1041 Lactate(!!): 2.3 [RS]   1248 Patient is resting comfortably with no emergent findings.  Abdomen is nonsurgical.  Findings with plan discussed including differential diagnosis, follow-up, and return instructions.  Patient needs to follow-up with GI as soon as possible. I had a discussion with the patient/family regarding diagnosis, diagnostic results, treatment plan, and medications.  The  patient/family indicated understanding of these instructions.  I spent adequate time at the bedside proceeding discharge necessary to personally discuss the aftercare instructions, giving patient education, providing explanations of the results of our evaluations/findings, and my decision making to assure that the patient/family understand the plan of care.  Time was allotted to answer questions at that time and throughout the ED course.  Emphasis was placed on timely follow-up after discharge.  I also discussed the potential for the development of an acute emergent condition requiring further evaluation, admission, or even surgical intervention. I discussed that we found nothing during the visit today indicating the need for further workup, admission, or the presence of an unstable medical condition.  I encouraged the patient to return to the emergency department immediately for ANY concerns, worsening, new complaints, or if symptoms persist and unable to seek follow-up in a timely fashion.  The patient/family expressed understanding and agreement with this plan.     [RS]      ED Course User Index  [RS] Teodoro Rothman MD     Recent Results (from the past 24 hour(s))   Comprehensive Metabolic Panel    Collection Time: 07/15/21  9:44 AM    Specimen: Blood   Result Value Ref Range    Glucose 139 (H) 65 - 99 mg/dL    BUN 8 8 - 23 mg/dL    Creatinine 0.98 0.76 - 1.27 mg/dL    Sodium 138 136 - 145 mmol/L    Potassium 4.3 3.5 - 5.2 mmol/L    Chloride 102 98 - 107 mmol/L    CO2 25.0 22.0 - 29.0 mmol/L    Calcium 9.5 8.6 - 10.5 mg/dL    Total Protein 6.6 6.0 - 8.5 g/dL    Albumin 4.10 3.50 - 5.20 g/dL    ALT (SGPT) 21 1 - 41 U/L    AST (SGOT) 28 1 - 40 U/L    Alkaline Phosphatase 81 39 - 117 U/L    Total Bilirubin 0.9 0.0 - 1.2 mg/dL    eGFR Non African Amer 76 >60 mL/min/1.73    Globulin 2.5 gm/dL    A/G Ratio 1.6 g/dL    BUN/Creatinine Ratio 8.2 7.0 - 25.0    Anion Gap 11.0 5.0 - 15.0 mmol/L   Lipase    Collection  Time: 07/15/21  9:44 AM    Specimen: Blood   Result Value Ref Range    Lipase 31 13 - 60 U/L   Lactic Acid, Plasma    Collection Time: 07/15/21  9:44 AM    Specimen: Blood   Result Value Ref Range    Lactate 2.3 (C) 0.5 - 2.0 mmol/L   Green Top (Gel)    Collection Time: 07/15/21  9:44 AM   Result Value Ref Range    Extra Tube Hold for add-ons.    Lavender Top    Collection Time: 07/15/21  9:44 AM   Result Value Ref Range    Extra Tube hold for add-on    Gold Top - SST    Collection Time: 07/15/21  9:44 AM   Result Value Ref Range    Extra Tube Hold for add-ons.    Gray Top    Collection Time: 07/15/21  9:44 AM   Result Value Ref Range    Extra Tube Hold for add-ons.    CBC Auto Differential    Collection Time: 07/15/21  9:44 AM    Specimen: Blood   Result Value Ref Range    WBC 6.50 3.40 - 10.80 10*3/mm3    RBC 4.83 4.14 - 5.80 10*6/mm3    Hemoglobin 15.5 13.0 - 17.7 g/dL    Hematocrit 46.8 37.5 - 51.0 %    MCV 96.9 79.0 - 97.0 fL    MCH 32.1 26.6 - 33.0 pg    MCHC 33.1 31.5 - 35.7 g/dL    RDW 13.1 12.3 - 15.4 %    RDW-SD 46.3 37.0 - 54.0 fl    MPV 10.0 6.0 - 12.0 fL    Platelets 225 140 - 450 10*3/mm3    Neutrophil % 78.2 (H) 42.7 - 76.0 %    Lymphocyte % 12.2 (L) 19.6 - 45.3 %    Monocyte % 7.2 5.0 - 12.0 %    Eosinophil % 1.5 0.3 - 6.2 %    Basophil % 0.6 0.0 - 1.5 %    Immature Grans % 0.3 0.0 - 0.5 %    Neutrophils, Absolute 5.08 1.70 - 7.00 10*3/mm3    Lymphocytes, Absolute 0.79 0.70 - 3.10 10*3/mm3    Monocytes, Absolute 0.47 0.10 - 0.90 10*3/mm3    Eosinophils, Absolute 0.10 0.00 - 0.40 10*3/mm3    Basophils, Absolute 0.04 0.00 - 0.20 10*3/mm3    Immature Grans, Absolute 0.02 0.00 - 0.05 10*3/mm3    nRBC 0.0 0.0 - 0.2 /100 WBC   Urinalysis With Microscopic If Indicated (No Culture) - Urine, Clean Catch    Collection Time: 07/15/21 10:49 AM    Specimen: Urine, Clean Catch   Result Value Ref Range    Color, UA Yellow Yellow, Straw    Appearance, UA Clear Clear    pH, UA 7.0 5.0 - 8.0    Specific Gravity, UA  "1.006 1.001 - 1.030    Glucose, UA Negative Negative    Ketones, UA Negative Negative    Bilirubin, UA Negative Negative    Blood, UA Negative Negative    Protein, UA Negative Negative    Leuk Esterase, UA Negative Negative    Nitrite, UA Negative Negative    Urobilinogen, UA 0.2 E.U./dL 0.2 - 1.0 E.U./dL     Note: In addition to lab results from this visit, the labs listed above may include labs taken at another facility or during a different encounter within the last 24 hours. Please correlate lab times with ED admission and discharge times for further clarification of the services performed during this visit.    CT Abdomen Pelvis With Contrast   Preliminary Result   Pancolonic diverticulosis without evidence for acute   diverticulitis. No free fluid or intra-abdominal free air. Area of gas   containing outpouching likely duodenal diverticulum within the medial   portion of the duodenal C-loop.       D:  07/15/2021   E:  07/15/2021            Vitals:    07/15/21 0937 07/15/21 1034 07/15/21 1042 07/15/21 1130   BP: 149/81 154/99  141/89   BP Location: Left arm Right arm     Patient Position: Sitting Lying     Pulse:  89 (!) 144 99   Resp:  12     Temp:       TempSrc:       SpO2:  97% 97% 97%   Weight: 83 kg (183 lb)      Height: 175.3 cm (69\")        Medications   Sodium Chloride (PF) 0.9 % 10 mL (has no administration in time range)   droperidol (INAPSINE) injection 2.5 mg (2.5 mg Intravenous Given 7/15/21 1013)   dicyclomine (BENTYL) capsule 20 mg (20 mg Oral Given 7/15/21 1013)   lactated ringers bolus 500 mL (0 mL Intravenous Stopped 7/15/21 1129)   iopamidol (ISOVUE-300) 61 % injection 100 mL (95 mL Intravenous Given 7/15/21 1201)     ECG/EMG Results (last 24 hours)     ** No results found for the last 24 hours. **        No orders to display                                              MDM  Number of Diagnoses or Management Options     Amount and/or Complexity of Data Reviewed  Clinical lab tests: " reviewed  Tests in the radiology section of CPT®: reviewed  Review and summarize past medical records: yes  Independent visualization of images, tracings, or specimens: yes        Final diagnoses:   Nausea   Diverticulosis     DISCHARGE    Patient discharged in stable condition.    Reviewed implications of results, diagnosis, meds, responsibility to follow up, warning signs and symptoms of possible worsening, potential complications and reasons to return to ER.    Patient/Family voiced understanding of above instructions.    Discussed plan for discharge, as there is no emergent indication for admission.  Pt/family is agreeable and understands need for follow up and possible repeat testing.  Pt/family is aware that discharge does not mean that nothing is wrong but that it indicates no emergency is currently present that requires admission and they must continue care with follow-up as given below or with a physician of their choice.     FOLLOW-UP  Justo Cervantes MD  [address]  [address]  [facility] [id number] [phone]    Schedule an appointment as soon as possible for a visit       [facility] Emergency Department  [address] [id number] [phone]    As needed, If symptoms worsen or ANY concerns.    Ger Chavarria MD  [address]  [address]  [facility] [id number] [phone]    Schedule an appointment as soon as possible for a visit            Medication List      New Prescriptions    hyoscyamine 0.125 MG SL tablet  Commonly known as: LEVSIN  Take 1 tablet by mouth Every 4 (Four) Hours As Needed for Cramping.     ondansetron ODT 8 MG disintegrating tablet  Commonly known as: ZOFRAN-ODT  Place 1 tablet on the tongue Every 8 (Eight) Hours As Needed for Nausea.        Stop    metoprolol tartrate 25 MG tablet  Commonly known as: LOPRESSOR           Where to Get Your Medications      These medications were sent to [place], [place]  - [address] [facility] - [phone]  - [phone] FX  [address] [facility] [address]    Phone: [phone]   · hyoscyamine 0.125 MG SL tablet  · ondansetron ODT 8 MG disintegrating tablet       Documentation assistance provided by kinsey Camilo.  Information recorded by the stevanibe was done at my direction and has been verified and validated by me.     Tom Camilo  07/15/21 [id number]       Teodoro Rothman MD  07/15/21 8515

## 2021-07-20 DIAGNOSIS — R53.81 MALAISE AND FATIGUE: Primary | ICD-10-CM

## 2021-07-20 DIAGNOSIS — R53.83 MALAISE AND FATIGUE: Primary | ICD-10-CM

## 2021-08-25 ENCOUNTER — OFFICE VISIT (OUTPATIENT)
Dept: CARDIOLOGY | Facility: CLINIC | Age: 66
End: 2021-08-25

## 2021-08-25 VITALS
HEART RATE: 91 BPM | SYSTOLIC BLOOD PRESSURE: 132 MMHG | HEIGHT: 69 IN | DIASTOLIC BLOOD PRESSURE: 68 MMHG | BODY MASS INDEX: 27.13 KG/M2 | WEIGHT: 183.2 LBS | OXYGEN SATURATION: 98 %

## 2021-08-25 DIAGNOSIS — I48.3 TYPICAL ATRIAL FLUTTER (HCC): ICD-10-CM

## 2021-08-25 DIAGNOSIS — I48.0 PAF (PAROXYSMAL ATRIAL FIBRILLATION) (HCC): Primary | ICD-10-CM

## 2021-08-25 PROCEDURE — 99213 OFFICE O/P EST LOW 20 MIN: CPT | Performed by: INTERNAL MEDICINE

## 2021-08-25 RX ORDER — LANOLIN ALCOHOL/MO/W.PET/CERES
400 CREAM (GRAM) TOPICAL DAILY
COMMUNITY

## 2021-08-25 NOTE — PROGRESS NOTES
Mike Knapp Jr.  1955  [phone]    08/25/2021    [facility] CARDIOLOGY     Referring Provider: No ref. provider found     Justo Cervantes MD  [address] [facility] [address] [place], [place] [id number]    Chief Complaint   Patient presents with   • PAF (paroxysmal atrial fibrillation) (CMS/[facility])       Problem List:     1. Chest pain:  a. Family history of CAD, father diagnosed in his 40s  b. Exercise GXT, Dr. Welch: Exercise duration 4 minutes and 23 seconds with no evidence of inducible ischemia.   c. Echocardiogram, 05/27/2014, Dr. Welch, revealing normal left ventricular ejection fraction (60%) with normal valvular structures.   d. Negative stress, 1/2021, EF >70%, no evidence of ischemia  e. Echo, 1/2021, EF 60%, Borderline concentric hypertrophy  2. Atrial flutter/atrial fibrillation:  a. Documentation by EKG, 08/25/2014.  b. Typical flutter ablation 10/17/2014.   c. Previously on sotalol 80 mg b.i.d.   1. Possibly causing symptoms of feeling drained and also a heavy pressure throughout body   d. Later switched to flecainide 50 mg b.i.d. and metoprolol 12.5 mg b.i.d.   e. Switched to Tikosyn 2/2017 with recurrent Afib and PACs  f. Jan 2017 Zio patch - symptomatic atrial arrhythmias including atrial fibrillation.  g. Sept 2017 s/p redo PVA with ablation along the roof of the RANDY with a roof and along fractioned signals on the posterior wall.   h. 10 day Zio patch, 10/2020, No AF, NSVT highest  bpm, NSAT  3. Thrombocytopenia:  a. Platelet count in October 2015 was 217 and most recent 92.  4. Hyperlipidemia.   5. Generalized anxiety disorder.   6. History of Clemons's esophagus/gastroesophageal reflux disease.   7. Osteoarthritis.   8. Recent colitis in 2014.  9. Left ear tinnitus secondary to a viral illness.   10. Benign lung cyst.     Allergies  Allergies   Allergen Reactions   • Sulfa Antibiotics Rash   • Statins Myalgia       Current Medications    Current Outpatient  Medications:   •  aspirin 81 MG EC tablet, Take 81 mg by mouth Daily., Disp: , Rfl:   •  cholecalciferol (VITAMIN D3) 1000 UNITS tablet, Take 4,000 Units by mouth Every Morning., Disp: , Rfl:   •  diphenhydrAMINE (BENADRYL) 25 mg capsule, Take 12.5 mg by mouth Every 6 (Six) Hours As Needed for Itching., Disp: , Rfl:   •  Flaxseed, Linseed, (Flaxseed Oil) 1200 MG capsule, Take  by mouth., Disp: , Rfl:   •  folic acid (FOLVITE) 400 MCG tablet, Take 400 mcg by mouth Daily., Disp: , Rfl:   •  hyoscyamine (LEVSIN) 0.125 MG SL tablet, Take 1 tablet by mouth Every 4 (Four) Hours As Needed for Cramping., Disp: 20 tablet, Rfl: 0  •  ibuprofen (ADVIL,MOTRIN) 400 MG tablet, Take 400 mg by mouth Every 6 (Six) Hours As Needed for Mild Pain ., Disp: , Rfl:   •  lansoprazole (PREVACID) 15 MG capsule, Take 1 capsule every morning 30 minutes before breakfast., Disp: 90 capsule, Rfl: 3  •  Loratadine 10 MG capsule, Take 10 mg by mouth Daily As Needed. PRN, Disp: , Rfl:   •  melatonin 5 MG tablet tablet, Take 5 mg by mouth Every Night., Disp: , Rfl:   •  Methylcellulose, Laxative, (SOLUBLE FIBER PO), Take 1 teaspoon(s) by mouth Daily., Disp: , Rfl:   •  metoprolol tartrate (LOPRESSOR) 25 MG tablet, Take 12.5 mg by mouth 3 (Three) Times a Day. PT taking 1/2 tablet 3 times daily, Disp: , Rfl:   •  ondansetron ODT (ZOFRAN-ODT) 8 MG disintegrating tablet, Place 1 tablet on the tongue Every 8 (Eight) Hours As Needed for Nausea., Disp: 15 tablet, Rfl: 0  •  promethazine (PHENERGAN) 25 MG tablet, Take 25 mg by mouth Every 6 (Six) Hours As Needed for Nausea or Vomiting., Disp: , Rfl:   •  Psyllium (METAMUCIL PO), Take 1 teaspoon(s) by mouth Daily., Disp: , Rfl:   •  vitamin B-12 (CYANOCOBALAMIN) 1000 MCG tablet, Take 1,000 mcg by mouth Every Morning., Disp: , Rfl:   •  Zinc 25 MG tablet, Take  by mouth Daily., Disp: , Rfl:     History of Present Illness     Pt presents for follow up of AF. Since we last saw the pt, pt denies any AF episodes,  "SOB, CP, LH, and dizziness. Denies any bleeding issues on ASA, or TIA/CVA symptoms. Patient over the last year has been following with GI for nausea due his small intestine bacterial overgrowth. He takes promethazine for the nausea, which helps some. He also completed a course of antibiotics for this diagnosis as well. Patient has noticed recently however that he has had an elevated HR at times following taking a warm bath and is concerned regarding this. He continues to take metoprolol 12.5 mg tid. Hrs avg over last 3 days 70-90s. BP well controlled.     ROS:  General:  + fatigue, No weight gain or loss +nausea   Cardiovascular:  Denies CP, PND, syncope, near syncope, edema + palpitations.  Pulmonary:  Denies WRIGHT, cough, or wheezing      Vitals:    08/25/21 1056   BP: 132/68   BP Location: Right arm   Patient Position: Sitting   Pulse: 91   SpO2: 98%   Weight: 83.1 kg (183 lb 3.2 oz)   Height: 175.3 cm (69\")     Body mass index is 27.05 kg/m².  PE:  General: NAD  Neck: no JVD, no carotid bruits, no TM  Heart RRR, NL S1, S2, S4 present, no rubs, murmurs  Lungs: CTA, no wheezes, rhonchi, or rales  Abd: soft, non-tender, NL BS  Ext: No musculoskeletal deformities, no edema, cyanosis, or clubbing  Psych: normal mood and affect    Diagnostic Data:      Procedures    1. PAF (paroxysmal atrial fibrillation) (CMS/[facility])    2. Typical atrial flutter (CMS/[facility])        Plan:  1) Paroxysmal atrial fibrillation:  - Patient with hx of atrial fibrillation with redo PVA with Dr. Lynch in 2017.  Previously failed Sotalol, Flecainide, and Tikosyn. Maintaining NSR currently. Continue metoprolol 12.5 mg tid.   - Continue present medications.     2) Typical atrial flutter:  - S/p RFA.  No documented recurrence.    3) Anticoagulation:  - CHADSVASc = 1 (age), on ASA, continue     4) Small intestine bacterial overgrowth  -Following with GI. Continues to have some intermittent nausea, he is able to hydrate and eat. Taking promethazine as " needed, continue.  Doubt ischemia     F/up in 12 months    Scribed for Tal Duarte MD by JASVIR Reyes. 8/25/2021 11:05 EDT     I, Tal Duarte MD, personally performed the services described in this documentation as scribed by the above named individual in my presence, and it is both accurate and complete.  8/25/2021  11:34 EDT

## 2022-03-07 ENCOUNTER — OFFICE VISIT (OUTPATIENT)
Dept: CARDIOLOGY | Facility: CLINIC | Age: 67
End: 2022-03-07

## 2022-03-07 VITALS
SYSTOLIC BLOOD PRESSURE: 116 MMHG | WEIGHT: 187 LBS | HEIGHT: 67 IN | BODY MASS INDEX: 29.35 KG/M2 | HEART RATE: 70 BPM | OXYGEN SATURATION: 98 % | DIASTOLIC BLOOD PRESSURE: 72 MMHG

## 2022-03-07 DIAGNOSIS — R07.89 OTHER CHEST PAIN: Primary | ICD-10-CM

## 2022-03-07 DIAGNOSIS — E78.2 MIXED HYPERLIPIDEMIA: ICD-10-CM

## 2022-03-07 DIAGNOSIS — I48.3 TYPICAL ATRIAL FLUTTER: ICD-10-CM

## 2022-03-07 DIAGNOSIS — I48.0 PAF (PAROXYSMAL ATRIAL FIBRILLATION): ICD-10-CM

## 2022-03-07 PROCEDURE — 99213 OFFICE O/P EST LOW 20 MIN: CPT | Performed by: INTERNAL MEDICINE

## 2022-03-07 NOTE — PROGRESS NOTES
[facility]  [address], [address]  [place], [place], [address]    [phone]  [email or web address]           OUTPATIENT CLINIC FOLLOW-UP NOTE    Patient care team:  Patient Care Team:  Subhash Genao MD as PCP - General (Family Medicine)      Subjective:   Chief complaint:   Chief Complaint   Patient presents with   • Atrial Fibrillation   • Shortness of Breath       HPI:    Mike Knapp Jr. is a 66 y.o. male.   at [facility]    Partial problem list, including cardiac problems:  1. Chest pain:  a. Family history of CAD, father diagnosed in his 40s  b. Exercise GXT, Dr. Welch: Exercise duration 4 minutes and 23 seconds with no evidence of inducible ischemia.   c. Echocardiogram, 05/27/2014, Dr. Welch, revealing normal left ventricular ejection fraction (60%) with normal valvular structures.   d. Negative stress and echo 1/2021  2. Atrial flutter/atrial fibrillation:  a. Documentation by EKG, 08/25/2014.  b. Typical flutter ablation 10/17/2014.   c. Previously on sotalol 80 mg b.i.d.   1. Possibly causing symptoms of feeling drained and also a heavy pressure throughout body   d. Later switched to flecainide 50 mg b.i.d. and metoprolol 12.5 mg b.i.d.   e. Switched to Tikosyn 2/2017 with recurrent Afib and PACs  f. Jan 2017 Zio patch - symptomatic atrial arrhythmias including atrial fibrillation.  g. Sept 2017 s/p redo PVA with ablation along the roof of the RANDY with a roof and along fractioned signals on the posterior wall.   3. Thrombocytopenia:  a. Platelet count in October 2015 was 217 and most recent 92.  4. Hyperlipidemia.   5. Generalized anxiety disorder.   6. History of Clemons's esophagus/gastroesophageal reflux disease.   7. Osteoarthritis.   8. Recent colitis in 2014.  9. Left ear tinnitus secondary to a viral illness.   10. Benign lung cyst.     He presents today for follow-up..    Patient been doing well from a cardiac standpoint since  "last visit.  Does note some occasional \"blips\" lasting only few seconds.  Also has some occasional dyspnea but does not feel this is worsened or become more frequent.  Notes he is a  and is fairly sedentary, sitting at a computer for long periods.  Continues to follow with GI for intermittent nausea which is improving.  Denies chest pain, lower extremity edema, lightheadedness or syncope.    Review of Systems:  As noted in above HPI.     PFSH:  Patient Active Problem List   Diagnosis   • Chest pain   • Typical atrial flutter (HCC)   • Thrombocytopenia (HCC)   • Hyperlipidemia   • Osteoarthritis   • PAF (paroxysmal atrial fibrillation) (HCC)         Current Outpatient Medications:   •  aspirin 81 MG EC tablet, Take 81 mg by mouth Daily., Disp: , Rfl:   •  cholecalciferol (VITAMIN D3) 1000 UNITS tablet, Take 4,000 Units by mouth Every Morning., Disp: , Rfl:   •  diphenhydrAMINE (BENADRYL) 25 mg capsule, Take 12.5 mg by mouth Every 6 (Six) Hours As Needed for Itching., Disp: , Rfl:   •  folic acid (FOLVITE) 400 MCG tablet, Take 400 mcg by mouth Daily., Disp: , Rfl:   •  hyoscyamine (LEVSIN) 0.125 MG SL tablet, Take 1 tablet by mouth Every 4 (Four) Hours As Needed for Cramping., Disp: 20 tablet, Rfl: 0  •  ibuprofen (ADVIL,MOTRIN) 400 MG tablet, Take 400 mg by mouth Every 6 (Six) Hours As Needed for Mild Pain ., Disp: , Rfl:   •  Loratadine 10 MG capsule, Take 10 mg by mouth Daily As Needed. PRN, Disp: , Rfl:   •  melatonin 5 MG tablet tablet, Take 5 mg by mouth Every Night., Disp: , Rfl:   •  metoprolol tartrate (LOPRESSOR) 25 MG tablet, Take 12.5 mg by mouth 3 (Three) Times a Day. PT taking 1/2 tablet in the am 1/4 in the afternoon and at night, Disp: , Rfl:   •  promethazine (PHENERGAN) 25 MG tablet, Take 25 mg by mouth Every 6 (Six) Hours As Needed for Nausea or Vomiting., Disp: , Rfl:   •  Psyllium (METAMUCIL PO), Take 1 teaspoon(s) by mouth Daily., Disp: , Rfl:   •  vitamin B-12 (CYANOCOBALAMIN) 1000 MCG " "tablet, Take 1,000 mcg by mouth Every Morning., Disp: , Rfl:   •  Zinc 25 MG tablet, Take  by mouth Daily., Disp: , Rfl:     Allergies   Allergen Reactions   • Sulfa Antibiotics Rash   • Statins Myalgia       Social History     Socioeconomic History   • Marital status:    Tobacco Use   • Smoking status: Never Smoker   • Smokeless tobacco: Never Used   Vaping Use   • Vaping Use: Never used   Substance and Sexual Activity   • Alcohol use: No   • Drug use: No   • Sexual activity: Defer     Family History   Problem Relation Age of Onset   • Arrhythmia Mother    • Hypertension Mother    • Diabetes Mother    • Heart disease Father    • Colon polyps Father    • Colon cancer Cousin          Objective:   Physical Exam:  /72 (BP Location: Right arm, Patient Position: Sitting)   Pulse 70   Ht 170.2 cm (67\")   Wt 84.8 kg (187 lb)   SpO2 98%   BMI 29.29 kg/m²   CONSTITUTIONAL: Well-nourished. In no acute distress.     11/2020 exam: Posterior tibial pulses are 2+ bilaterally. Carotid upstrokes are 2+ and symmetrical without bruits.      APRN exam: CARDIOVASCULAR: Regular rate and rhythm with a normal S1 and S2. There is no murmur, gallop, rub, or click appreciated. Normal radial pulse. There is no peripheral edema.        Labs:  BUN   Date Value Ref Range Status   07/15/2021 8 8 - 23 mg/dL Final     Creatinine   Date Value Ref Range Status   07/15/2021 0.98 0.76 - 1.27 mg/dL Final     Potassium   Date Value Ref Range Status   07/15/2021 4.3 3.5 - 5.2 mmol/L Final     ALT (SGPT)   Date Value Ref Range Status   07/15/2021 21 1 - 41 U/L Final     AST (SGOT)   Date Value Ref Range Status   07/15/2021 28 1 - 40 U/L Final     WBC   Date Value Ref Range Status   07/15/2021 6.50 3.40 - 10.80 10*3/mm3 Final     Hemoglobin   Date Value Ref Range Status   07/15/2021 15.5 13.0 - 17.7 g/dL Final     Hematocrit   Date Value Ref Range Status   07/15/2021 46.8 37.5 - 51.0 % Final     Platelets   Date Value Ref Range Status "   07/15/2021 225 140 - 450 10*3/mm3 Final       No results found for: CHOL  No results found for: TRIG  No results found for: HDL  No results found for: LDL  No components found for: LDLDIRECTC    Diagnostic Data:    Procedures    Results for orders placed in visit on 11/16/20    Adult Transthoracic Echo Complete W/ Cont if Necessary Per Protocol    Interpretation Summary  · Left ventricular systolic function is normal. Estimated left ventricular EF = 60%  · Left ventricular wall thickness is consistent with borderline concentric hypertrophy.    14-day Zio patch 10/2020  -No A. fib, NSVT highest HR approximately 130 bpm, NSAT    Stress test 1/2021  · Left ventricular ejection fraction is hyperdynamic (Calculated EF > 70%). .  · Myocardial perfusion imaging indicates a normal myocardial perfusion study with no evidence of ischemia.  · Impressions are consistent with a low risk study.    Stress test 3/17/2017  · Lexiscan cardiolite within normal limits.  · Pt held Metoprolol for 2 days but continued taking Tikosyn. Presented with SR in 130's at baseline while standing.  · Left ventricular ejection fraction is normal (Calculated EF = 70%).    Assessment and Plan:     Precordial chest pain   Chronic abdominal complaints  -Currently asymptomatic.   -Discussed importance of increasing activity/exercise  -Continue to follow with GI for abdominal complaints.     Paroxysmal atrial fibrillation  Typical atrial flutter   Palpitations  -s/p prior PVA and flutter ablation  -Followed by Dr. Duarte.  -Continue aspirin, metoprolol    -The patient wishes to follow up with electrophysiology moving forward.    - Return if symptoms worsen or fail to improve.    Scribed for Serg Lei MD by Madisyn Perdomo, APRN. 3/7/2022  11:16 EST    I, Serg Lei MD, personally performed the services as scribed by the above named individual. I have made any necessary edits and it is both accurate and complete.     Serg Lei MD, MSc,  FAC, [facility]  Interventional Cardiology  [facility]

## 2022-08-31 ENCOUNTER — OFFICE VISIT (OUTPATIENT)
Dept: CARDIOLOGY | Facility: CLINIC | Age: 67
End: 2022-08-31

## 2022-08-31 VITALS
DIASTOLIC BLOOD PRESSURE: 64 MMHG | SYSTOLIC BLOOD PRESSURE: 116 MMHG | BODY MASS INDEX: 29.51 KG/M2 | HEART RATE: 76 BPM | WEIGHT: 188 LBS | OXYGEN SATURATION: 98 % | HEIGHT: 67 IN

## 2022-08-31 DIAGNOSIS — I48.3 TYPICAL ATRIAL FLUTTER: ICD-10-CM

## 2022-08-31 DIAGNOSIS — I48.0 PAF (PAROXYSMAL ATRIAL FIBRILLATION): Primary | ICD-10-CM

## 2022-08-31 PROCEDURE — 99213 OFFICE O/P EST LOW 20 MIN: CPT | Performed by: INTERNAL MEDICINE

## 2022-08-31 RX ORDER — ALPHA LIPOIC ACID 300 MG
CAPSULE ORAL
COMMUNITY

## 2022-08-31 RX ORDER — MULTIVIT WITH MINERALS/LUTEIN
TABLET ORAL
COMMUNITY

## 2022-08-31 NOTE — PROGRESS NOTES
Mike Knapp Jr.  1955  [phone]    08/31/2022    [facility]     Referring Provider: No ref. provider found     Subhash Genao MD  [address] [place], [place] [id number]    Chief Complaint   Patient presents with   • PAF (paroxysmal atrial fibrillation)       Problem List:      1. Chest pain:  a. Family history of CAD, father diagnosed in his 40s  b. Exercise GXT, Dr. Welch: Exercise duration 4 minutes and 23 seconds with no evidence of inducible ischemia.   c. Echocardiogram, 05/27/2014, Dr. Welch, revealing normal left ventricular ejection fraction (60%) with normal valvular structures.   d. Negative stress, 1/2021, EF >70%, no evidence of ischemia  e. Echo, 1/2021, EF 60%, Borderline concentric hypertrophy  2. Atrial flutter/atrial fibrillation:  a. Documentation by EKG, 08/25/2014.  b. Typical flutter ablation 10/17/2014.   c. Previously on sotalol 80 mg b.i.d.   1. Possibly causing symptoms of feeling drained and also a heavy pressure throughout body   d. Later switched to flecainide 50 mg b.i.d. and metoprolol 12.5 mg b.i.d.   e. Switched to Tikosyn 2/2017 with recurrent Afib and PACs  f. Jan 2017 Zio patch - symptomatic atrial arrhythmias including atrial fibrillation.  g. Sept 2017 s/p redo PVA with ablation along the roof of the RANDY with a roof and along fractioned signals on the posterior wall.   h. 10 day Zio patch, 10/2020, No AF, NSVT highest  bpm, NSAT  3. Thrombocytopenia:  a. Platelet count in October 2015 was 217 and most recent 92.  4. Hyperlipidemia.   5. Generalized anxiety disorder.   6. History of Clemons's esophagus/gastroesophageal reflux disease.   7. Osteoarthritis.   8. Recent colitis in 2014.  9. Left ear tinnitus secondary to a viral illness.   10. Benign lung cyst.       Allergies  Allergies   Allergen Reactions   • Sulfa Antibiotics Rash   • Statins Myalgia       Current Medications    Current Outpatient  Medications:   •  Alpha-Lipoic Acid 300 MG capsule, , Disp: , Rfl:   •  aspirin 81 MG EC tablet, Take 81 mg by mouth Daily., Disp: , Rfl:   •  cholecalciferol (VITAMIN D3) 1000 UNITS tablet, Take 4,000 Units by mouth Every Morning., Disp: , Rfl:   •  diphenhydrAMINE (BENADRYL) 25 mg capsule, Take 12.5 mg by mouth Every 6 (Six) Hours As Needed for Itching., Disp: , Rfl:   •  folic acid (FOLVITE) 400 MCG tablet, Take 400 mcg by mouth Daily., Disp: , Rfl:   •  ibuprofen (ADVIL,MOTRIN) 400 MG tablet, Take 400 mg by mouth Every 6 (Six) Hours As Needed for Mild Pain ., Disp: , Rfl:   •  Loratadine 10 MG capsule, Take 10 mg by mouth Daily As Needed. PRN, Disp: , Rfl:   •  melatonin 5 MG tablet tablet, Take 5 mg by mouth Every Night., Disp: , Rfl:   •  metoprolol tartrate (LOPRESSOR) 25 MG tablet, Take 12.5 mg by mouth 3 (Three) Times a Day. PT taking 1/2 tablet in the am 1/4 in the afternoon and at night, Disp: , Rfl:   •  promethazine (PHENERGAN) 25 MG tablet, Take 25 mg by mouth Every 6 (Six) Hours As Needed for Nausea or Vomiting., Disp: , Rfl:   •  vitamin B-12 (CYANOCOBALAMIN) 1000 MCG tablet, Take 1,000 mcg by mouth Every Morning., Disp: , Rfl:   •  vitamin C (ASCORBIC ACID) 250 MG tablet, , Disp: , Rfl:   •  Zinc 25 MG tablet, Take  by mouth Daily., Disp: , Rfl:     History of Present Illness:      Pt presents for follow up of atrial fibrillation, typical atrial flutter. Since we last saw the pt, pt denies any AF episodes, SOB, CP, LH, and dizziness, syncope. Denies any hospitalizations, ER visits, bleeding issues, or TIA/CVA symptoms. Overall feels well.    ROS:  General:  Denies fatigue, weight gain or loss  Cardiovascular:  Denies CP, PND, syncope, near syncope, edema, right ankle mildly swollen which is chronic in nature. -  palpitations.  Pulmonary:  Denies WRIGHT, cough, or wheezing      Vitals:    08/31/22 1021   BP: 116/64   BP Location: Left arm   Patient Position: Sitting   Pulse: 76   SpO2: 98%   Weight:  "85.3 kg (188 lb)   Height: 170.2 cm (67\")     Body mass index is 29.44 kg/m².  PE:  General: NAD. A & O x 3  Neck: no JVD, no carotid bruits, no TM  Heart RRR, NL S1, S2, S4 present, no rubs, murmurs  Lungs: CTA, no wheezes, rhonchi, or rales  Abd: soft, non-tender, NL BS  Ext: No musculoskeletal deformities, no edema, cyanosis, or clubbing  Psych: normal mood and affect    Diagnostic Data:      Procedures    1. PAF (paroxysmal atrial fibrillation) (HCC)    2. Typical atrial flutter (HCC)          Plan:  1) Paroxysmal atrial fibrillation:  - Patient with hx of atrial fibrillation with redo PVA with Dr. Lynch in 2017.  Previously failed Sotalol, Flecainide, and Tikosyn. Maintaining NSR currently. Continue metoprolol 12.5 mg tid.   - Continue present medications.      2) Typical atrial flutter:  - S/p RFA.  No documented recurrence.     3) Anticoagulation:  - CHADSVASc = 1 (age), on ASA, continue     F/up in 12 months    Electronically signed by ULICES Lee, 08/31/22, 10:12 AM EDT.    "

## 2022-10-04 NOTE — PLAN OF CARE
Improved with PT. She will schedule with hand surgeon if symptoms worsen. Problem: Patient Care Overview (Adult)  Goal: Plan of Care Review  Outcome: Ongoing (interventions implemented as appropriate)    02/08/17 1511   Outcome Evaluation   Outcome Summary/Follow up Plan Pt resting well today. No s/s of distress or c/o pain. VSS. Still in NSR.        Goal: Adult Individualization and Mutuality  Outcome: Ongoing (interventions implemented as appropriate)  Goal: Discharge Needs Assessment  Outcome: Ongoing (interventions implemented as appropriate)    Problem: Arrhythmia/Dysrhythmia (Symptomatic) (Adult)  Goal: Signs and Symptoms of Listed Potential Problems Will be Absent or Manageable (Arrhythmia/Dysrhythmia)  Outcome: Ongoing (interventions implemented as appropriate)

## 2023-02-02 ENCOUNTER — TELEPHONE (OUTPATIENT)
Dept: GASTROENTEROLOGY | Facility: CLINIC | Age: 68
End: 2023-02-02
Payer: MEDICARE

## 2023-09-01 ENCOUNTER — OUTSIDE FACILITY SERVICE (OUTPATIENT)
Dept: GASTROENTEROLOGY | Facility: CLINIC | Age: 68
End: 2023-09-01
Payer: MEDICARE

## 2023-09-01 PROCEDURE — 88305 TISSUE EXAM BY PATHOLOGIST: CPT

## 2023-09-05 ENCOUNTER — LAB REQUISITION (OUTPATIENT)
Dept: LAB | Facility: HOSPITAL | Age: 68
End: 2023-09-05
Payer: MEDICARE

## 2023-09-05 DIAGNOSIS — K22.70 BARRETT'S ESOPHAGUS WITHOUT DYSPLASIA: ICD-10-CM

## 2023-09-05 DIAGNOSIS — K29.80 DUODENITIS WITHOUT BLEEDING: ICD-10-CM

## 2023-09-05 DIAGNOSIS — K29.70 GASTRITIS, UNSPECIFIED, WITHOUT BLEEDING: ICD-10-CM

## 2023-09-05 DIAGNOSIS — K57.10 DIVERTICULOSIS OF SMALL INTESTINE WITHOUT PERFORATION OR ABSCESS WITHOUT BLEEDING: ICD-10-CM

## 2023-09-05 DIAGNOSIS — K22.89 OTHER SPECIFIED DISEASE OF ESOPHAGUS: ICD-10-CM

## 2023-09-05 DIAGNOSIS — K44.9 DIAPHRAGMATIC HERNIA WITHOUT OBSTRUCTION OR GANGRENE: ICD-10-CM

## 2023-09-06 ENCOUNTER — OFFICE VISIT (OUTPATIENT)
Dept: CARDIOLOGY | Facility: CLINIC | Age: 68
End: 2023-09-06
Payer: MEDICARE

## 2023-09-06 VITALS
DIASTOLIC BLOOD PRESSURE: 70 MMHG | BODY MASS INDEX: 27.25 KG/M2 | SYSTOLIC BLOOD PRESSURE: 110 MMHG | HEIGHT: 69 IN | HEART RATE: 70 BPM | OXYGEN SATURATION: 98 % | WEIGHT: 184 LBS

## 2023-09-06 DIAGNOSIS — I48.3 TYPICAL ATRIAL FLUTTER: ICD-10-CM

## 2023-09-06 DIAGNOSIS — I48.0 PAF (PAROXYSMAL ATRIAL FIBRILLATION): Primary | ICD-10-CM

## 2023-09-06 LAB — REF LAB TEST METHOD: NORMAL

## 2023-09-06 PROCEDURE — 93000 ELECTROCARDIOGRAM COMPLETE: CPT | Performed by: INTERNAL MEDICINE

## 2023-09-06 PROCEDURE — 99213 OFFICE O/P EST LOW 20 MIN: CPT | Performed by: INTERNAL MEDICINE

## 2023-09-06 NOTE — PROGRESS NOTES
Mike Knapp Jr.  1955  [phone]    09/06/2023    [facility] CARDIOLOGY     Referring Provider: No ref. provider found     Subhash Genao MD  [address] [place], [place] [id number]    Chief Complaint   Patient presents with    PAF     1 year follow up       Problem List:   Chest pain:  Family history of CAD, father diagnosed in his 40s  Exercise GXT, Dr. Welch: Exercise duration 4 minutes and 23 seconds with no evidence of inducible ischemia.   Echocardiogram, 05/27/2014, Dr. Welch, revealing normal left ventricular ejection fraction (60%) with normal valvular structures.   Negative stress, 1/2021, EF >70%, no evidence of ischemia  Echo, 1/2021, EF 60%, Borderline concentric hypertrophy  Atrial flutter/atrial fibrillation:  Documentation by EKG, 08/25/2014.  Typical flutter ablation 10/17/2014.   Previously on sotalol 80 mg b.i.d.   Possibly causing symptoms of feeling drained and also a heavy pressure throughout body   Later switched to flecainide 50 mg b.i.d. and metoprolol 12.5 mg b.i.d.   Switched to Tikosyn 2/2017 with recurrent Afib and PACs  Jan 2017 Zio patch - symptomatic atrial arrhythmias including atrial fibrillation.  Sept 2017 s/p redo PVA with ablation along the roof of the RANDY with a roof and along fractioned signals on the posterior wall.   10 day Zio patch, 10/2020, No AF, NSVT highest  bpm, NSAT  Thrombocytopenia:  a. Platelet count in October 2015 was 217 and most recent 92.  Hyperlipidemia.   Generalized anxiety disorder.   History of Clemons's esophagus/gastroesophageal reflux disease.   Osteoarthritis.   Recent colitis in 2014.  Left ear tinnitus secondary to a viral illness.   Benign lung cyst.     Allergies  Allergies   Allergen Reactions    Sulfa Antibiotics Rash    Statins Myalgia       Current Medications    Current Outpatient Medications:     Alpha-Lipoic Acid 300 MG capsule, , Disp: , Rfl:     aspirin 81 MG EC tablet, Take 1 tablet by  "mouth Daily., Disp: , Rfl:     cholecalciferol (VITAMIN D3) 1000 UNITS tablet, Take 4 tablets by mouth Every Morning., Disp: , Rfl:     diphenhydrAMINE (BENADRYL) 25 mg capsule, Take 12.5 mg by mouth Every 6 (Six) Hours As Needed for Itching., Disp: , Rfl:     folic acid (FOLVITE) 400 MCG tablet, Take 1 tablet by mouth Daily., Disp: , Rfl:     ibuprofen (ADVIL,MOTRIN) 400 MG tablet, Take 1 tablet by mouth Every 6 (Six) Hours As Needed for Mild Pain., Disp: , Rfl:     Loratadine 10 MG capsule, Take 1 capsule by mouth Daily As Needed. PRN, Disp: , Rfl:     melatonin 5 MG tablet tablet, Take 1 tablet by mouth Every Night., Disp: , Rfl:     metoprolol tartrate (LOPRESSOR) 25 MG tablet, Take 0.5 tablets by mouth 3 (Three) Times a Day. (Patient taking differently: Take 1 tablet by mouth 3 (Three) Times a Day. 0.5 tablet early morn, 1/4 before lunch and before bed), Disp: 135 tablet, Rfl: 6    promethazine (PHENERGAN) 25 MG tablet, Take 1 tablet by mouth Every 6 (Six) Hours As Needed for Nausea or Vomiting., Disp: , Rfl:     vitamin B-12 (CYANOCOBALAMIN) 1000 MCG tablet, Take 1 tablet by mouth Every Morning., Disp: , Rfl:     Zinc 25 MG tablet, Take  by mouth Daily., Disp: , Rfl:     History of Present Illness     Pt presents for follow up of AF/AFL. Since we last saw the pt, pt denies any AF episodes, SOB, CP, LH, and dizziness. Wife recently diagnosed with pancreatitic cancer. Dealing with significant stress. Denies any hospitalizations, ER visits, bleeding, or TIA/CVA symptoms. Overall feels well. BP stable.       Vitals:    09/06/23 0915   BP: 110/70   BP Location: Right arm   Patient Position: Sitting   Pulse: 70   SpO2: 98%   Weight: 83.5 kg (184 lb)   Height: 175.3 cm (69\")     Body mass index is 27.17 kg/m².  PE:  General: NAD  Neck: no JVD, no carotid bruits, no TM  Heart RRR, NL S1, S2, S4 present, no rubs, murmurs  Lungs: CTA, no wheezes, rhonchi, or rales  Abd: soft, non-tender, NL BS  Ext: No musculoskeletal " deformities, no edema, cyanosis, or clubbing  Psych: normal mood and affect    Diagnostic Data:        ECG 12 Lead    Date/Time: 9/6/2023 9:31 AM  Performed by: Tal Duarte MD  Authorized by: Tal Duarte MD   Comparison: compared with previous ECG from 11/30/2020  Similar to previous ECG  Rhythm: sinus rhythm  BPM: 66        Advance Care Planning   ACP discussion was held with the patient during this visit. Patient has an advance directive (not in EMR), copy requested.       1. PAF (paroxysmal atrial fibrillation)    2. Typical atrial flutter          Plan:    1) Paroxysmal atrial fibrillation:  - Patient with hx of atrial fibrillation with redo PVA with Dr. Lynch in 2017.  Previously failed Sotalol, Flecainide, and Tikosyn. Maintaining NSR currently. Continue metoprolol 12.5 mg tid.   - Continue present medications.      2) Typical atrial flutter:  - S/p RFA.  No documented recurrence.     3) Anticoagulation:  - CHADSVASc = 1 (age), on ASA, continue       F/up in 12 months

## 2023-11-02 NOTE — PROGRESS NOTES
Pt was calling to get appointment rescheduled but only appointment is next year. Pt lost her brother this week and will be out of town. Pt would appreciate a call back to reschedule echo and Dr. Mercado.       Called LVM to reschedule   Subjective:   Mike Knapp Jr.  1955  [phone]  [phone]    01/15/2018    [facility] CARDIOLOGY    Manuel Cooper MD  [address] [place] [address] [address] [place] [address]    REFERRING DOCTOR: Manuel Cooper      Patient ID: Mike Knapp Jr. is a 62 y.o. male.    Chief Complaint:   Chief Complaint   Patient presents with   • Atrial Fibrillation       Problem List:      1. Chest pain:  a. Symptoms of twinges of chest pain occurring at rest x3 months.   b. Exercise GXT, Dr. Welch: Exercise duration 4 minutes and 23 seconds with no evidence of inducible ischemia.   c. Echocardiogram, 05/27/2014, Dr. Welch, revealing normal left ventricular ejection fraction (60%) with normal valvular structures.   2. Atrial flutter/atrial fibrillation:  a. Documentation by EKG, 08/25/2014.  b. Patient is status post EP study and typical flutter ablation on 10/17/2014.   c. On 04/19/2014, patient noted palpitations at home and went to the emergency department. On telemetry monitor at the outside hospital, patient was noted to be in type 1 atrial fibrillation with rates of about 140 beats per minute. There was no atrial flutter noted on the telemetry strip or telemetry EKGs at this hospitalization.  d. Patient was started on sotalol 80 mg b.i.d. and continued on his Xarelto 20 mg once a day for this new onset detection of atrial fibrillation, status post atrial flutter ablation. CHADS score = 0 with questionable history of hypertension.   e. Patient is currently only on aspirin due to the CHADS score of 0 and per patient’s request even after explanation of his CHADS score of possibly 1 with questionable hypertension and recurrent episode of atrial fibrillation as noted. The atrial fibrillation is very short-lived in nature.  f. Patient with continued episodes of short-lived atrial fibrillation even on the sotalol. Patient has stated that he has tried to taper off his sotalol, and whenever he is  decreased to 40 mg of the sotalol, he states he has had recurrence of his atrial fibrillation with RVR.  g. Patient states that he thinks the sotalol has been causing him symptoms of feeling drained and also a heavy pressure throughout his body is noted.   h. Patient’s sotalol has been discontinued, and he is currently maintained on flecainide 50 mg b.i.d. and metoprolol 12.5 mg b.i.d. He is currently on aspirin, since he has a CHADS score of 0 with no long episodes of atrial fibrillation at the present time.   i. Patient maintained currently on flecainide 50 mg b.i.d. and aspirin with pill-in-the-pocket Xarelto. Patient has a CHADS-VASc score of 0. Patient states his last episodes of atrial fibrillation were in July and September, which lasted about 1 to 1-1/2 hours.   j. Tikosyn 2/2017 with recurrent Afib and PACs  k. Jan 2017 Zio patch showed recurrent episodes of symptomatic atrial arrhythmias including atrial fibrillation.  l. S/P redo PVA with ablation along the roof of the RANDY with a roof and along fractioned signals on the posterior wall. 9/19/2017  3. Thrombocytopenia:  a. Platelet count in October 2015 was 217 and most recent 92.  4. Hyperlipidemia.   5. Generalized anxiety disorder.   6. History of Clemons's esophagus/gastroesophageal reflux disease.   7. Osteoarthritis.   8. Recent colitis in 2014.  9. Left ear tinnitus secondary to a viral illness.   10. Benign lung cyst.        Allergies   Allergen Reactions   • Sulfa Antibiotics Rash       Current Outpatient Prescriptions:   •  cholecalciferol (VITAMIN D3) 1000 UNITS tablet, Take 1,000 Units by mouth Every Morning., Disp: , Rfl:   •  diphenhydrAMINE (BENADRYL) 25 mg capsule, Take 12.5 mg by mouth Every 6 (Six) Hours As Needed for Itching., Disp: , Rfl:   •  ELIQUIS 5 MG tablet tablet, TAKE 1 TABLET TWO TIMES A DAY, Disp: 60 tablet, Rfl: 6  •  Flaxseed, Linseed, (FLAXSEED OIL PO), Take 1,000 mg by mouth Every Morning., Disp: , Rfl:   •  flecainide  "(TAMBOCOR) 50 MG tablet, Take 1 tablet by mouth Every 12 (Twelve) Hours., Disp: 60 tablet, Rfl: 3  •  lansoprazole (PREVACID) 30 MG capsule, Take 30 mg by mouth Every Morning Before Breakfast., Disp: , Rfl:   •  Melatonin 3 MG capsule, Take 1.5 mg by mouth Every Night., Disp: , Rfl:   •  metoprolol tartrate (LOPRESSOR) 25 MG tablet, Take 12.5 mg by mouth 3 (Three) Times a Day., Disp: , Rfl:   •  vitamin B-12 (CYANOCOBALAMIN) 1000 MCG tablet, Take 1,000 mcg by mouth Every Morning., Disp: , Rfl:     History of Present Illness  Patient is a 62 year old male with a history of recurrent symptomatic paroxysmal atrial fibrillation now status post redo pulmonary vein ablation in September 2017 here for follow-up visit. Overall doing well with no recurrences. No major issues post procedure.     No issues with chest pain, shortness of breath, fevers, chills, night sweats, PND, orthopnea or palpitations. No recent ER visits or hospital stays.      The following portions of the patient's history were reviewed and updated as appropriate: allergies, current medications, past family history, past medical history, past social history, past surgical history and problem list.    ROS   14 point ROS negative except as outlined in problem list, HPI and other parts of the note.      ECG 12 Lead  Date/Time: 1/15/2018 10:37 AM  Performed by: MICHELINE CHILDERS  Authorized by: MICHELINE CHILDERS   Rhythm: sinus rhythm  Comments: HR 80 bpm, QRS 78 msec.                Objective:       /80 (BP Location: Left arm, Patient Position: Sitting)  Pulse 80  Ht 175.3 cm (69\")  Wt 94.8 kg (209 lb)  BMI 30.86 kg/m2    GENERAL: Well-developed, well-nourished patient in no acute distress.  HEENT: Normocephalic, atraumatic, PERRLA. Moist mucous membranes.  NECK: No JVD present at 30°. No carotid bruits auscultated.  LUNGS: Clear to auscultation.  CARDIOVASCULAR: Heart has a regular rate and rhythm. No murmurs, gallops or rubs noted.   ABDOMEN: Soft, " nontender. Positive bowel sounds.  MUSCULOSKELETAL: No gross deformities. No clubbing, cyanosis, or lower extremity edema.  SKIN: Pink, warm  Neuro: Nonfocal exam. Gait intact  Ext: No edema or bruising    The patient's old records including ambulatory rhythm recordings (ECGs, Holter/event monitor) were reviewed and discussed.      Lab Review:   Results for orders placed or performed during the hospital encounter of 09/19/17   POC Activated Clotting Time   Result Value Ref Range    Activated Clotting Time  290 (H) 82 - 152 Seconds   POC Activated Clotting Time   Result Value Ref Range    Activated Clotting Time  356 (H) 82 - 152 Seconds   POC Activated Clotting Time   Result Value Ref Range    Activated Clotting Time  367 (H) 82 - 152 Seconds   POC Activated Clotting Time   Result Value Ref Range    Activated Clotting Time  356 (H) 82 - 152 Seconds   POC Activated Clotting Time   Result Value Ref Range    Activated Clotting Time  186 (H) 82 - 152 Seconds   POC Activated Clotting Time   Result Value Ref Range    Activated Clotting Time  164 (H) 82 - 152 Seconds   POC Activated Clotting Time   Result Value Ref Range    Activated Clotting Time  142 82 - 152 Seconds   POC Activated Clotting Time   Result Value Ref Range    Activated Clotting Time  136 82 - 152 Seconds           Diagnosis:   1. PAF (paroxysmal atrial fibrillation)      Assessment & Plan:   1. Paroxysmal Afib, Aflutter s/p redo PVA in Sept 2017 with no recurrences noted. History of Aflutter RFA and previous original PVA. For now, will stop the flecainide and if no AFib in one month can change Eliquis to a baby ASA 81 mg. CHADS VASC of 0.   2. Follow up in 4 mths and at that time consider stopping metoprolol depending on HRs         CC: Manuel Lycnh DO  01/15/18  10:24 AM      EMR Dragon/Transcription disclaimer:  Much of this encounter note is an electronic transcription/translation of spoken language to printed text. Electronic  translation of spoken language may permit erroneous, or at times, nonsensical words or phrases to be inadvertently transcribed. Although I have reviewed the note for such errors, some may still exist.

## 2024-12-30 ENCOUNTER — TELEPHONE (OUTPATIENT)
Dept: CARDIOLOGY | Facility: CLINIC | Age: 69
End: 2024-12-30

## 2024-12-30 NOTE — TELEPHONE ENCOUNTER
Since the patient has not been seen since 9/6/23, I let him know that he would need to be seen in the office before we could provide cardiac clearance for his upcoming surgery.    I transferred him to [facility] so he can make an appointment with [facility] to be evaluated.

## 2024-12-30 NOTE — TELEPHONE ENCOUNTER
Caller: Mike Knapp Jr.    Relationship: Self    Best call back number: [phone]    What form or medical record are you requesting: CLEARANCE FORM     Who is requesting this form or medical record from you: HARLEEN WRIGHT     How would you like to receive the form or medical records (pick-up, mail, fax): FAX  If fax, what is the fax number: [phone]    Timeframe paperwork needed: ASAP    Additional notes:

## 2025-01-06 ENCOUNTER — OFFICE VISIT (OUTPATIENT)
Dept: CARDIOLOGY | Facility: CLINIC | Age: 70
End: 2025-01-06
Payer: MEDICARE

## 2025-01-06 VITALS
BODY MASS INDEX: 28.73 KG/M2 | DIASTOLIC BLOOD PRESSURE: 78 MMHG | SYSTOLIC BLOOD PRESSURE: 118 MMHG | WEIGHT: 194 LBS | HEIGHT: 69 IN | TEMPERATURE: 85 F | OXYGEN SATURATION: 95 %

## 2025-01-06 DIAGNOSIS — I48.0 PAROXYSMAL ATRIAL FIBRILLATION: Primary | ICD-10-CM

## 2025-01-06 DIAGNOSIS — Z01.810 PRE-OPERATIVE CARDIOVASCULAR EXAMINATION: ICD-10-CM

## 2025-01-06 RX ORDER — VIT C/B6/B5/MAGNESIUM/HERB 173 50-5-6-5MG
CAPSULE ORAL DAILY
COMMUNITY

## 2025-01-06 RX ORDER — PHENOL 1.4 %
AEROSOL, SPRAY (ML) MUCOUS MEMBRANE DAILY
COMMUNITY

## 2025-01-06 RX ORDER — BERBERINE CHLOR/SEAWEED/CHROM 500-250 MG
CAPSULE ORAL
COMMUNITY

## 2025-01-06 RX ORDER — CREATINE MONOHYDRATE 5000 MG
POWDER IN PACKET (EA) ORAL DAILY
COMMUNITY

## 2025-01-06 RX ORDER — DIMENHYDRINATE 50 MG
TABLET ORAL DAILY
COMMUNITY

## 2025-01-06 RX ORDER — TESTOSTERONE GEL, 1% 10 MG/G
50 GEL TRANSDERMAL DAILY
COMMUNITY
Start: 2024-12-27

## 2025-01-06 RX ORDER — BOSWELLIA SERRATA EXTRACT 70 %
POWDER (GRAM) MISCELLANEOUS DAILY
COMMUNITY

## 2025-01-06 RX ORDER — PARSLEY 450 MG
CAPSULE ORAL
COMMUNITY

## 2025-01-06 RX ORDER — LANOLIN ALCOHOL/MO/W.PET/CERES
CREAM (GRAM) TOPICAL DAILY
COMMUNITY

## 2025-01-06 RX ORDER — ALPHA LIPOIC ACID 300 MG
CAPSULE ORAL DAILY
COMMUNITY

## 2025-01-06 NOTE — PROGRESS NOTES
[facility] Cardiology  [address], [address]  [place], [place], [address]    [phone]  [email or web address]           OUTPATIENT CLINIC FOLLOW-UP NOTE    Patient care team:  Patient Care Team:  Kathy Schwab MD as PCP - General (General Practice)  Serg Lei MD as Consulting Physician (Cardiology)  Tal Duarte MD as Consulting Physician (Cardiac Electrophysiology)      Subjective:   Chief complaint:   Chief Complaint   Patient presents with    Cardiac Clearance     Right hip replacement, Dr. Trivedi       HPI:    Mike Knapp Jr. is a 69 y.o. male.   at [facility]    Partial problem list, including cardiac problems:  Atrial flutter/atrial fibrillation:  Documentation by EKG, 08/25/2014.  Typical flutter ablation 10/17/2014.   Previously on sotalol 80 mg b.i.d.   Possibly causing symptoms of feeling drained and also a heavy pressure throughout body   Later switched to flecainide 50 mg b.i.d. and metoprolol 12.5 mg b.i.d.   Switched to Tikosyn 2/2017 with recurrent Afib and PACs  Jan 2017 Zio patch - symptomatic atrial arrhythmias including atrial fibrillation.  Sept 2017 s/p redo PVA with ablation along the roof of the RANDY with a roof and along fractioned signals on the posterior wall.   10 day Zio patch, 10/2020, No AF, NSVT highest  bpm, NSAT   Chest pain:  Family history of CAD, father diagnosed in his 40s  Exercise GXT, Dr. Welch: Exercise duration 4 minutes and 23 seconds with no evidence of inducible ischemia.   Echocardiogram, 05/27/2014, Dr. Welch, revealing normal left ventricular ejection fraction (60%) with normal valvular structures.   Negative stress and echo 1/2021  Thrombocytopenia:  a. Platelet count in October 2015 was 217 and most recent 92.  Hyperlipidemia.   Generalized anxiety disorder.   History of Clemons's esophagus/gastroesophageal reflux disease.   Osteoarthritis.   Recent colitis in 2014.  Left ear  tinnitus secondary to a viral illness.   Benign lung cyst.     He presents today for follow-up..    Able to walk his dog and around [employer] without cardiopulmonary symptoms.  Is limited by his right hip.  Denies chest pain, significant palpitations    Review of Systems:  As noted in above HPI.     PFSH:  Patient Active Problem List   Diagnosis    Chest pain    Typical atrial flutter    Thrombocytopenia    Hyperlipidemia    Osteoarthritis    PAF (paroxysmal atrial fibrillation)         Current Outpatient Medications:     Alpha-Lipoic Acid 300 MG capsule, Take  by mouth Daily., Disp: , Rfl:     Ashwagandha 500 MG capsule, Take  by mouth., Disp: , Rfl:     aspirin 81 MG EC tablet, Take 1 tablet by mouth Daily., Disp: , Rfl:     Berberine Chloride (Berberine HCI) 500 MG capsule, Take  by mouth., Disp: , Rfl:     Boswellia Darius Extract powder, Daily., Disp: , Rfl:     cholecalciferol (VITAMIN D3) 1000 UNITS tablet, Take 4 tablets by mouth Every Morning., Disp: , Rfl:     Creatine ([id number]) 5000 MG pack, Take  by mouth Daily., Disp: , Rfl:     diphenhydrAMINE (BENADRYL) 25 mg capsule, Take 12.5 mg by mouth Every 6 (Six) Hours As Needed for Itching., Disp: , Rfl:     Flaxseed, Linseed, (Flax Seed Oil) 1000 MG capsule, Take  by mouth Daily., Disp: , Rfl:     folic acid (FOLVITE) 400 MCG tablet, Take 1 tablet by mouth Daily., Disp: , Rfl:     ibuprofen (ADVIL,MOTRIN) 400 MG tablet, Take 1 tablet by mouth Every 6 (Six) Hours As Needed for Mild Pain., Disp: , Rfl:     Loratadine 10 MG capsule, Take 1 capsule by mouth Daily As Needed. PRN, Disp: , Rfl:     melatonin 5 MG tablet tablet, Take 1 tablet by mouth Every Night., Disp: , Rfl:     metoprolol tartrate (LOPRESSOR) 25 MG tablet, TAKE 1/2 TABLETS BY MOUTH 3 (THREE) TIMES A DAY., Disp: 135 tablet, Rfl: 5    promethazine (PHENERGAN) 25 MG tablet, Take 1 tablet by mouth Every 6 (Six) Hours As Needed for Nausea or Vomiting., Disp: , Rfl:     Resveratrol 250 MG capsule,  "Take  by mouth Daily., Disp: , Rfl:     testosterone (ANDROGEL) 50 MG/5GM (1%) gel gel, Place 50 mg on the skin as directed by provider Daily., Disp: , Rfl:     Turmeric (CurcuPlex-95) 500 MG capsule, Take  by mouth Daily., Disp: , Rfl:     vitamin B-12 (CYANOCOBALAMIN) 1000 MCG tablet, Take 1 tablet by mouth Every Morning., Disp: , Rfl:     vitamin B-12 (CYANOCOBALAMIN) 1000 MCG tablet, Take  by mouth Daily., Disp: , Rfl:     Zinc Sulfate 66 MG tablet, Take  by mouth Daily., Disp: , Rfl:     Allergies   Allergen Reactions    Sulfa Antibiotics Rash    Egg-Derived Products Other (See Comments)     Note: Abdominal Pain-Major    Statins Myalgia       Social History     Socioeconomic History    Marital status:    Tobacco Use    Smoking status: Never    Smokeless tobacco: Never   Vaping Use    Vaping status: Never Used   Substance and Sexual Activity    Alcohol use: No    Drug use: No    Sexual activity: Yes     Partners: Female       Objective:   Physical Exam:  /78 (BP Location: Right arm, Patient Position: Sitting, Cuff Size: Adult)   Temp (!) 85 °F (29.4 °C)   Ht 175.3 cm (69.02\")   Wt 88 kg (194 lb)   SpO2 95%   BMI 28.64 kg/m²   CONSTITUTIONAL: No acute distress  CARDIOVASCULAR: Regular rate and rhythm with normal S1 and S2. Without murmur.  PERIPHERAL VASCULAR: No carotid bruit bilaterally.  Normal radial pulse.      11/2020 exam: Posterior tibial pulses are 2+ bilaterally. Carotid upstrokes are 2+ and symmetrical without bruits.        Labs:  No results found for: \"CHOL\"  No results found for: \"TRIG\"  No results found for: \"HDL\"  No results found for: \"LDL\"  No components found for: \"LDLDIRECTC\"    Diagnostic Data:      ECG 12 Lead    Date/Time: 1/6/2025 2:40 PM  Performed by: Serg Lei MD    Authorized by: Serg Lei MD  Comparison: compared with previous ECG from 9/6/2023  Similar to previous ECG  Rhythm: sinus rhythm          Results for orders placed in visit on 11/16/20    Adult " Transthoracic Echo Complete W/ Cont if Necessary Per Protocol    Interpretation Summary  · Left ventricular systolic function is normal. Estimated left ventricular EF = 60%  · Left ventricular wall thickness is consistent with borderline concentric hypertrophy.    Assessment and Plan:     Preoperative cardiac risk assessment  -Currently asymptomatic.   -Able to perform ADLs without cardiopulmonary symptoms  -Okay to proceed with hip surgery from a cardiac standpoint without additional preoperative cardiac testing  -Will send the patient's orthopedist office a letter tomorrow    Paroxysmal atrial fibrillation  Typical atrial flutter   Palpitations  -s/p prior PVA and flutter ablation  -Followed by Dr. Duarte.  -Continue aspirin, metoprolol    -The patient wishes to follow up with electrophysiology moving forward.  - Return in about 1 year (around 1/6/2026) for Follow up with Dr Duarte.      Serg Lei MD, MSc, FACC, [facility]  Interventional Cardiology  [facility]

## 2025-01-08 ENCOUNTER — TELEPHONE (OUTPATIENT)
Dept: CARDIOLOGY | Facility: CLINIC | Age: 70
End: 2025-01-08
Payer: MEDICARE

## 2025-01-08 NOTE — TELEPHONE ENCOUNTER
----- Message from Serg Lei sent at 1/6/2025  2:41 PM EST -----  Please send the patient's orthopedist office preoperative cardiac risk assessment-okay to proceed with hip surgery from a cardiac standpoint without additional cardiac testing.  Low cardiac risk.     [facility] -

## 2025-01-08 NOTE — TELEPHONE ENCOUNTER
Notified pt he was okay from a cardiac standpoint to have hip surgery.     Letter faxed to 's office.

## 2025-01-27 ENCOUNTER — TELEPHONE (OUTPATIENT)
Dept: CARDIOLOGY | Facility: CLINIC | Age: 70
End: 2025-01-27
Payer: MEDICARE

## 2025-01-27 NOTE — TELEPHONE ENCOUNTER
Patient called to let you know that he had a hip replacement done last Wednesday. He said that since the surgery his heart has been skipping more frequently. He states that he has not been given any type of steroid for inflammation.    Today his BP is 134/74, his RA oxygen saturation is 99% and his HR is 77 bpm and he is in NSR.  He said that he has been taking Metoprolol 25 mg PO BID and took an extra 25 mg today. He is taking all of his medications as prescribed. He is taking a baby Aspirin daily.    He states that he is taking pain medication, etc and is starting to feel better today.     He is going to continue to monitor his symptoms and let us know if he becomes more symptomatic.

## 2025-03-10 ENCOUNTER — TELEPHONE (OUTPATIENT)
Dept: CARDIOLOGY | Facility: CLINIC | Age: 70
End: 2025-03-10
Payer: MEDICARE

## 2025-03-10 NOTE — TELEPHONE ENCOUNTER
Patient called and left a message. I tried to call him back but he did not answer. I did not get the option to leave a message.

## 2025-03-19 ENCOUNTER — OFFICE VISIT (OUTPATIENT)
Dept: CARDIOLOGY | Facility: CLINIC | Age: 70
End: 2025-03-19
Payer: MEDICARE

## 2025-03-19 VITALS
BODY MASS INDEX: 29.03 KG/M2 | SYSTOLIC BLOOD PRESSURE: 128 MMHG | WEIGHT: 196 LBS | HEIGHT: 69 IN | DIASTOLIC BLOOD PRESSURE: 82 MMHG | HEART RATE: 85 BPM | OXYGEN SATURATION: 95 %

## 2025-03-19 DIAGNOSIS — I48.0 PAF (PAROXYSMAL ATRIAL FIBRILLATION): ICD-10-CM

## 2025-03-19 DIAGNOSIS — I48.3 TYPICAL ATRIAL FLUTTER: Primary | ICD-10-CM

## 2025-03-19 DIAGNOSIS — I48.3 TYPICAL ATRIAL FLUTTER: ICD-10-CM

## 2025-03-19 DIAGNOSIS — I48.0 PAF (PAROXYSMAL ATRIAL FIBRILLATION): Primary | ICD-10-CM

## 2025-03-19 RX ORDER — ONDANSETRON 4 MG/1
TABLET, FILM COATED ORAL
COMMUNITY
Start: 2025-01-21

## 2025-03-19 NOTE — PROGRESS NOTES
Mike Knapp Jr.  1955  [phone]    03/19/2025    [facility] CARDIOLOGY     Referring Provider: No ref. provider found     Kathy Schwab MD  [address] [address]    Chief Complaint   Patient presents with    PAF (paroxysmal atrial fibrillation)         Problem List:   Chest pain:  Family history of CAD, father diagnosed in his 40s  Exercise GXT, Dr. Welch: Exercise duration 4 minutes and 23 seconds with no evidence of inducible ischemia.   Echocardiogram, 05/27/2014, Dr. Welch, revealing normal left ventricular ejection fraction (60%) with normal valvular structures.   Negative stress, 1/2021, EF >70%, no evidence of ischemia  Echo, 1/2021, EF 60%, Borderline concentric hypertrophy  Atrial flutter/atrial fibrillation:  Documentation by EKG, 08/25/2014.  Typical flutter ablation 10/17/2014.   Previously on sotalol 80 mg b.i.d.   Possibly causing symptoms of feeling drained and also a heavy pressure throughout body   Later switched to flecainide 50 mg b.i.d. and metoprolol 12.5 mg b.i.d.   Switched to Tikosyn 2/2017 with recurrent Afib and PACs  Jan 2017 Zio patch - symptomatic atrial arrhythmias including atrial fibrillation.  Sept 2017 s/p redo PVA with ablation along the roof of the RANDY with a roof and along fractioned signals on the posterior wall.   10 day Zio patch, 10/2020, No AF, NSVT highest  bpm, NSAT  Thrombocytopenia:  a. Platelet count in October 2015 was 217 and most recent 92.  Hyperlipidemia.   Generalized anxiety disorder.   History of Clemons's esophagus/gastroesophageal reflux disease.   Osteoarthritis.   Recent colitis in 2014.  Left ear tinnitus secondary to a viral illness.   Benign lung cyst.   Hip replacement     Allergies  Allergies   Allergen Reactions    Sulfa Antibiotics Rash    Egg-Derived Products Other (See Comments)     Note: Abdominal Pain-Major    Statins Myalgia       Current Medications    Current  Outpatient Medications:     Alpha-Lipoic Acid 300 MG capsule, Take  by mouth Daily., Disp: , Rfl:     Ashwagandha 500 MG capsule, Take  by mouth., Disp: , Rfl:     aspirin 81 MG EC tablet, Take 1 tablet by mouth Daily., Disp: , Rfl:     Berberine Chloride (Berberine HCI) 500 MG capsule, Take  by mouth., Disp: , Rfl:     Boswellia Darius Extract powder, Daily., Disp: , Rfl:     cholecalciferol (VITAMIN D3) 1000 UNITS tablet, Take 4 tablets by mouth Every Morning., Disp: , Rfl:     Creatine ([id number]) 5000 MG pack, Take  by mouth Daily., Disp: , Rfl:     diphenhydrAMINE (BENADRYL) 25 mg capsule, Take 12.5 mg by mouth Every 6 (Six) Hours As Needed for Itching., Disp: , Rfl:     Flaxseed, Linseed, (Flax Seed Oil) 1000 MG capsule, Take  by mouth Daily., Disp: , Rfl:     folic acid (FOLVITE) 400 MCG tablet, Take 1 tablet by mouth Daily., Disp: , Rfl:     ibuprofen (ADVIL,MOTRIN) 400 MG tablet, Take 1 tablet by mouth Every 6 (Six) Hours As Needed for Mild Pain., Disp: , Rfl:     Loratadine 10 MG capsule, Take 1 capsule by mouth Daily As Needed. PRN, Disp: , Rfl:     melatonin 5 MG tablet tablet, Take 1 tablet by mouth Every Night., Disp: , Rfl:     Melatonin-Pyridoxine 5-1 MG tablet, Take 1 tablet by mouth., Disp: , Rfl:     metoprolol tartrate (LOPRESSOR) 25 MG tablet, TAKE 1/2 TABLETS BY MOUTH 3 (THREE) TIMES A DAY., Disp: 135 tablet, Rfl: 5    ondansetron (ZOFRAN) 4 MG tablet, PRN, Disp: , Rfl:     promethazine (PHENERGAN) 25 MG tablet, Take 1 tablet by mouth Every 6 (Six) Hours As Needed for Nausea or Vomiting., Disp: , Rfl:     Resveratrol 250 MG capsule, Take  by mouth Daily., Disp: , Rfl:     testosterone (ANDROGEL) 50 MG/5GM (1%) gel gel, Place 50 mg on the skin as directed by provider Daily., Disp: , Rfl:     Turmeric (CurcuPlex-95) 500 MG capsule, Take  by mouth Daily., Disp: , Rfl:     vitamin B-12 (CYANOCOBALAMIN) 1000 MCG tablet, Take 1 tablet by mouth Every Morning., Disp: , Rfl:     vitamin B-12  "(CYANOCOBALAMIN) 1000 MCG tablet, Take  by mouth Daily., Disp: , Rfl:     Zinc Sulfate 66 MG tablet, Take  by mouth Daily., Disp: , Rfl:     History of Present Illness:     Pt presents for follow up of AF/AFL. Since we last saw the pt, he had hip surgery about 8 weeks ago. Since then, he has been having more palpitations described as skipped beats. He went to the ED on 3/9/2025 due to prolonged symptoms of 24 hours and was told he was having PACs. He continues to have skipped beats with associated ache feeling in his chest. This feels different than his afib in the past. He is currently only taking an ASA daily. No anticoagulation. He denies SOB, CP, LH, and dizziness. Denies any hospitalizations, ER visits, bleeding, or TIA/CVA symptoms.           Vitals:    03/19/25 0950   BP: 128/82   BP Location: Right arm   Patient Position: Sitting   Cuff Size: Adult   Pulse: 85   SpO2: 95%   Weight: 88.9 kg (196 lb)   Height: 175.3 cm (69.02\")     Body mass index is 28.93 kg/m².  PE:  General: NAD  Neck: no JVD, no carotid bruits, no TM  Heart RRR, NL S1, S2, S4 present, no rubs, murmurs  Lungs: CTA, no wheezes, rhonchi, or rales  Abd: soft, non-tender, NL BS  Ext: No musculoskeletal deformities, right ankle edema which is chronic in nature, no left leg edema edema, cyanosis, or clubbing  Psych: normal mood and affect    Diagnostic Data:    EKG performed today 3/19/2025 shows normal sinus rhythm 72 bpm with no PACs or PVCs.    Procedures           1. PAF (paroxysmal atrial fibrillation)    2. Typical atrial flutter          Plan:  1) Paroxysmal atrial fibrillation:  - Patient with hx of atrial fibrillation with redo PVA with Dr. Lynch in 2017.  Previously failed Sotalol, Flecainide, and Tikosyn. Maintaining NSR currently. Continue metoprolol   -He is having more frequent palpitations in the last several weeks most likely consistent with PACs, however we will do a 2-week Zio patch to rule out recurrent atrial fibrillation and " order an echocardiogram to assess LV function and valve morphology.  For now continue aspirin, if he is having atrial fibrillation we will need to start Eliquis.  - Continue present medications.      2) Typical atrial flutter:  - S/p RFA.  No documented recurrence.     3) Anticoagulation:  - CHADSVASc = 1 (age), on ASA, continue     F/up in 12 months    Electronically signed by ULICES Lee, 03/19/25, 10:18 AM EDT.

## 2025-03-31 RX ORDER — METOPROLOL TARTRATE 25 MG/1
TABLET, FILM COATED ORAL
Qty: 135 TABLET | Refills: 3 | Status: SHIPPED | OUTPATIENT
Start: 2025-03-31

## 2025-04-11 LAB
CV ZIO BASELINE AVG BPM: 70
CV ZIO BASELINE BPM HIGH: 142
CV ZIO BASELINE BPM LOW: 49

## 2025-05-05 ENCOUNTER — TELEPHONE (OUTPATIENT)
Dept: CARDIOLOGY | Facility: CLINIC | Age: 70
End: 2025-05-05
Payer: MEDICARE

## 2025-05-05 NOTE — TELEPHONE ENCOUNTER
Patient was seen in clinic on 3/19/25. He would like to know the results of his heart monitor and what the plan will be at this point?

## 2025-05-06 NOTE — TELEPHONE ENCOUNTER
Holter report has not been officially read yet. It does not look like he has any atrial fibrillation on there. We can let him know once Dr. Duarte reads it.

## 2025-05-14 LAB
CV ZIO BASELINE AVG BPM: 70
CV ZIO BASELINE BPM HIGH: 142
CV ZIO BASELINE BPM LOW: 49

## 2025-05-16 ENCOUNTER — HOSPITAL ENCOUNTER (OUTPATIENT)
Dept: CARDIOLOGY | Facility: HOSPITAL | Age: 70
Discharge: HOME | End: 2025-05-16
Payer: MEDICARE

## 2025-05-16 VITALS — WEIGHT: 196 LBS | BODY MASS INDEX: 29.03 KG/M2 | HEIGHT: 69 IN

## 2025-05-16 DIAGNOSIS — I48.0 PAF (PAROXYSMAL ATRIAL FIBRILLATION): ICD-10-CM

## 2025-05-16 DIAGNOSIS — I48.3 TYPICAL ATRIAL FLUTTER: ICD-10-CM

## 2025-05-16 PROCEDURE — 93306 TTE W/DOPPLER COMPLETE: CPT

## 2025-05-19 LAB
AORTIC DIMENSIONLESS INDEX: 0.62 (DI)
ASCENDING AORTA: 3.1 CM
AV MEAN PRESS GRAD SYS DOP V1V2: 4.5 MMHG
AV VMAX SYS DOP: 142.5 CM/SEC
BH CV ECHO MEAS - AO MAX PG: 8.1 MMHG
BH CV ECHO MEAS - AO ROOT DIAM: 3.5 CM
BH CV ECHO MEAS - AO V2 VTI: 32.7 CM
BH CV ECHO MEAS - AVA(I,D): 1.94 CM2
BH CV ECHO MEAS - EDV(CUBED): 97.3 ML
BH CV ECHO MEAS - EDV(MOD-SP2): 113 ML
BH CV ECHO MEAS - EDV(MOD-SP4): 117 ML
BH CV ECHO MEAS - EF(MOD-SP2): 72.9 %
BH CV ECHO MEAS - EF(MOD-SP4): 70.7 %
BH CV ECHO MEAS - ESV(CUBED): 17.6 ML
BH CV ECHO MEAS - ESV(MOD-SP2): 30.6 ML
BH CV ECHO MEAS - ESV(MOD-SP4): 34.3 ML
BH CV ECHO MEAS - FS: 43.5 %
BH CV ECHO MEAS - IVS/LVPW: 0.8 CM
BH CV ECHO MEAS - IVSD: 0.8 CM
BH CV ECHO MEAS - LA DIMENSION: 4.3 CM
BH CV ECHO MEAS - LAT PEAK E' VEL: 12.5 CM/SEC
BH CV ECHO MEAS - LV DIASTOLIC VOL/BSA (35-75): 57.1 CM2
BH CV ECHO MEAS - LV MASS(C)D: 137.7 GRAMS
BH CV ECHO MEAS - LV MAX PG: 2.8 MMHG
BH CV ECHO MEAS - LV MEAN PG: 1.5 MMHG
BH CV ECHO MEAS - LV SYSTOLIC VOL/BSA (12-30): 16.7 CM2
BH CV ECHO MEAS - LV V1 MAX: 83.3 CM/SEC
BH CV ECHO MEAS - LV V1 VTI: 20.2 CM
BH CV ECHO MEAS - LVIDD: 4.6 CM
BH CV ECHO MEAS - LVIDS: 2.6 CM
BH CV ECHO MEAS - LVOT AREA: 3.1 CM2
BH CV ECHO MEAS - LVOT DIAM: 2 CM
BH CV ECHO MEAS - LVPWD: 1 CM
BH CV ECHO MEAS - MED PEAK E' VEL: 8.2 CM/SEC
BH CV ECHO MEAS - MV A MAX VEL: 44 CM/SEC
BH CV ECHO MEAS - MV DEC SLOPE: 377 CM/SEC2
BH CV ECHO MEAS - MV DEC TIME: 0.18 SEC
BH CV ECHO MEAS - MV E MAX VEL: 78.1 CM/SEC
BH CV ECHO MEAS - MV E/A: 1.78
BH CV ECHO MEAS - MV P1/2T: 79.2 MSEC
BH CV ECHO MEAS - MVA(P1/2T): 2.8 CM2
BH CV ECHO MEAS - PA ACC TIME: 0.08 SEC
BH CV ECHO MEAS - PA V2 MAX: 126.5 CM/SEC
BH CV ECHO MEAS - PAPD(PI EDV): 5 MMHG
BH CV ECHO MEAS - PI END-D VEL: 116.5 CM/SEC
BH CV ECHO MEAS - RAP SYSTOLE: 3 MMHG
BH CV ECHO MEAS - RVSP: 36 MMHG
BH CV ECHO MEAS - SV(LVOT): 63.5 ML
BH CV ECHO MEAS - SV(MOD-SP2): 82.4 ML
BH CV ECHO MEAS - SV(MOD-SP4): 82.7 ML
BH CV ECHO MEAS - SVI(LVOT): 31 ML/M2
BH CV ECHO MEAS - SVI(MOD-SP2): 40.2 ML/M2
BH CV ECHO MEAS - SVI(MOD-SP4): 40.4 ML/M2
BH CV ECHO MEAS - TAPSE (>1.6): 2.46 CM
BH CV ECHO MEAS - TR MAX PG: 32.7 MMHG
BH CV ECHO MEAS - TR MAX VEL: 265.5 CM/SEC
BH CV ECHO MEASUREMENTS AVERAGE E/E' RATIO: 7.55
BH CV VAS BP LEFT ARM: NORMAL MMHG
BH CV XLRA - RV BASE: 3.8 CM
BH CV XLRA - RV LENGTH: 7.3 CM
BH CV XLRA - TDI S': 12.3 CM/SEC
IVRT: 56 MS
LEFT ATRIUM VOLUME INDEX: 27.3 ML/M2
LV EF 2D ECHO EST: 70 %
LV EF BIPLANE MOD: 69.9 %

## 2025-05-20 ENCOUNTER — TELEPHONE (OUTPATIENT)
Dept: CARDIOLOGY | Facility: CLINIC | Age: 70
End: 2025-05-20
Payer: MEDICARE

## 2025-05-20 NOTE — TELEPHONE ENCOUNTER
Called patient to let him know that his echocardiogram and monitor were within normal limits.  Monitor showed rare PACs and nonsustained atrial tachycardia.  There was no atrial fibrillation.  He states his palpitations have somewhat improved more recently and he is overall doing well.  Will make no further changes and he will follow-up as scheduled.    Electronically signed by ULICES Lee, 05/20/25, 10:07 AM EDT.

## 2025-06-16 NOTE — TELEPHONE ENCOUNTER
Patient called to let us know that he had an episode after exercising on Tuesday. He started feeling skipped beats that felt very different from his previous epidoses. He stated that he took 25 mg of Metoprolol, drank plenty of water and started feeling better. He has not had any episodes of Afib in 3 years. He just wanted to make you aware.   Iv lock Declines

## (undated) DEVICE — GW DIAG .032

## (undated) DEVICE — DRSNG SURESITE123 4X4.8IN

## (undated) DEVICE — INTRO SHEATH ENGAGE W/50 GW .038 9F12

## (undated) DEVICE — ST EXT IV SMARTSITE 2VLV SP M LL 5ML IV1

## (undated) DEVICE — INTRO SWARTZ TRNSEP LAMP90 8.5F 63CM

## (undated) DEVICE — Device: Brand: MEDEX

## (undated) DEVICE — SET PRIMARY GRVTY 10DP MALE LL 104IN

## (undated) DEVICE — SOL NACL 0.9PCT 1000ML

## (undated) DEVICE — ST INF PRI SMRTSTE 20DRP 2VLV 24ML 117

## (undated) DEVICE — INTRO SHEATH FAST/CATH LG/LUM 11F .038IN 12CM

## (undated) DEVICE — CONTRL CONTRST CHMBRD W/TBG72IN REUS

## (undated) DEVICE — Device: Brand: PENTARAY NAV

## (undated) DEVICE — Device: Brand: THERMOCOOL SMARTTOUCH SF

## (undated) DEVICE — ADULT, W/LG. BACK PAD, RADIOTRANSPARENT ELEMENT AND LEAD WIRE: Brand: DEFIBRILLATION ELECTRODES

## (undated) DEVICE — KT MANIFLD EP

## (undated) DEVICE — DECANTER: Brand: UNBRANDED

## (undated) DEVICE — DECANT BG O JET

## (undated) DEVICE — INTRO SHEATH FAST/CATH STD 8.5F .038IN 12CM

## (undated) DEVICE — PRESSURE MONITORING SET: Brand: TRUWAVE

## (undated) DEVICE — ST EXT IV LG BORE NDLESS FLTR LL 17IN

## (undated) DEVICE — INTRO SHEATH 8F60CM

## (undated) DEVICE — DOME MONITORING W BONDED STPCK BIOTRANS2

## (undated) DEVICE — INTRO SHEATH ENGAGE W/50 GW .038 7F12

## (undated) DEVICE — Device: Brand: SOUNDSTAR

## (undated) DEVICE — SYS TRNSEP ACC BRK ACROSS A/ 71CM

## (undated) DEVICE — Device: Brand: WEBSTER CS

## (undated) DEVICE — Device: Brand: REFERENCE PATCH CARTO 3

## (undated) DEVICE — Device: Brand: SMARTABLATE

## (undated) DEVICE — LEX ELECTRO PHYSIOLOGY: Brand: MEDLINE INDUSTRIES, INC.